# Patient Record
Sex: FEMALE | Race: WHITE | Employment: FULL TIME | URBAN - METROPOLITAN AREA
[De-identification: names, ages, dates, MRNs, and addresses within clinical notes are randomized per-mention and may not be internally consistent; named-entity substitution may affect disease eponyms.]

---

## 2018-02-22 ENCOUNTER — OFFICE VISIT (OUTPATIENT)
Dept: SURGERY | Facility: CLINIC | Age: 28
End: 2018-02-22
Payer: COMMERCIAL

## 2018-02-22 VITALS
DIASTOLIC BLOOD PRESSURE: 70 MMHG | HEIGHT: 63 IN | WEIGHT: 147.31 LBS | BODY MASS INDEX: 26.1 KG/M2 | HEART RATE: 89 BPM | SYSTOLIC BLOOD PRESSURE: 121 MMHG

## 2018-02-22 DIAGNOSIS — Z98.84 BARIATRIC SURGERY STATUS: Primary | ICD-10-CM

## 2018-02-22 PROCEDURE — 99244 OFF/OP CNSLTJ NEW/EST MOD 40: CPT | Performed by: SURGERY

## 2018-02-22 RX ORDER — LEVONORGESTREL AND ETHINYL ESTRADIOL 0.15-0.03
1 KIT ORAL DAILY
Status: ON HOLD | COMMUNITY
Start: 2017-10-26 | End: 2022-08-05

## 2018-02-22 NOTE — MR AVS SNAPSHOT
"              After Visit Summary   2018    Belkys Mosley    MRN: [id number]           Patient Information     Date Of Birth          1990        Visit Information        Provider Department      2018 2:00 PM Larry Shearer MD [facility]      Today's Diagnoses     Bariatric surgery status    -  1    BMI 26.0-26.9,adult           Follow-ups after your visit        Who to contact     If you have questions or need follow up information about today's clinic visit or your schedule please contact [facility] directly at [phone].  Normal or non-critical lab and imaging results will be communicated to you by MyChart, letter or phone within 4 business days after the clinic has received the results. If you do not hear from us within 7 days, please contact the clinic through U-NOTEhart or phone. If you have a critical or abnormal lab result, we will notify you by phone as soon as possible.  Submit refill requests through Mobvoi or call your pharmacy and they will forward the refill request to us. Please allow 3 business days for your refill to be completed.          Additional Information About Your Visit        MyChart Information     Mobvoi lets you send messages to your doctor, view your test results, renew your prescriptions, schedule appointments and more. To sign up, go to [email or web address] . Click on \"Log in\" on the left side of the screen, which will take you to the Welcome page. Then click on \"Sign up Now\" on the right side of the page.     You will be asked to enter the access code listed below, as well as some personal information. Please follow the directions to create your username and password.     Your access code is: [email or web address]  Expires: 2018  7:29 AM     Your access code will  in 90 days. If you need help or a new code, please call your [facility] clinic or " "[phone].        Care EveryWhere ID     This is your Care EveryWhere ID. This could be used by other organizations to access your [place] medical records  [id number]        Your Vitals Were     Pulse Height BMI (Body Mass Index)             89 5' 3\" (1.6 m) 26.1 kg/m2          Blood Pressure from Last 3 Encounters:   02/22/18 121/70   01/11/13 111/65    Weight from Last 3 Encounters:   02/22/18 147 lb 5 oz (66.8 kg)   01/11/13 152 lb (68.9 kg)              We Performed the Following     OP ROOMING NOTE TO LUIS ARMANDO        Primary Care Provider Office Phone # Fax #    Renay Mares [phone]       [facility] [address] [address] [facility] [address]        Equal Access to Services     [facility] DUNCAN : Hadii aad ku hadasho Soomaali, waaxda luqadaha, qaybta kaalmada adeegyada, nelly lee . So [facility] [phone].    ATENCIÓN: Si habla español, tiene a stewart disposición servicios gratuitos de asistencia lingüística. Daniel al [phone].    We comply with applicable federal civil rights laws and [place] laws. We do not discriminate on the basis of race, color, national origin, age, disability, sex, sexual orientation, or gender identity.            Thank you!     Thank you for choosing [place] [facility]  for your care. Our goal is always to provide you with excellent care. Hearing back from our patients is one way we can continue to improve our services. Please take a few minutes to complete the written survey that you may receive in the mail after your visit with us. Thank you!             Your Updated Medication List - Protect others around you: Learn how to safely use, store and throw away your medicines at www.disposemymeds.org.          This list is accurate as of 2/22/18 11:59 PM.  Always use your most recent med list.                   Brand Name Dispense Instructions for use Diagnosis    levonorgestrel-ethinyl estradiol " 0.15-30 MG-MCG per tablet    ELIZABETH     Take 1 tablet by mouth daily        ranitidine 150 MG tablet    ZANTAC     Take 150 mg by mouth as needed

## 2018-02-26 NOTE — PROGRESS NOTES
"[facility]  Surgery Consultation    CONSULTATION REQUESTED BY:  Renay Mares [phone]    HPI: This patient is a 27-year-old female who presents to discuss the possibility of laparoscopic gastric band removal and replacement.  She had a lap band placed approximately nine years ago.  She has been very successful with weight loss.  She has lost over 100 pounds since its initial placement.  Approximately two years ago she underwent replacement of her port.  This was performed due to dysfunction of the port and leaking.  In the recent past she has been experiencing lower abdominal pain.  Through the course of evaluation for this she has had imaging that shows the port has become disconnected from the band itself.  Additional imaging has also shown a slip of the band.  She has had episodes of epigastric abdominal pain as well as lower abdominal and pelvic pain.  She persists in noticing restriction associated with her band.  She was evaluated at an outside clinic but then decided to transfer her care to ours.    PMH:   has no past medical history on file.  PSH:    has a past surgical history that includes Bariatric Surgery and Ankle surgery (4502-0937).  Social History:   reports that she has never smoked. She has never used smokeless tobacco. She reports that she drinks alcohol. She reports that she does not use illicit drugs.  Family History:   family history includes Breast Cancer in her paternal grandfather.  Medications/Allergies: Home medications and allergies reviewed.    ROS:  The 10 point Review of Systems is negative other than noted in the HPI.    Physical Exam:  /70  Pulse 89  Ht 5' 3\" (1.6 m)  Wt 147 lb 5 oz (66.8 kg)  BMI 26.1 kg/m2  GENERAL: Generally appears well.  Psych: Alert and Oriented.  Normal affect  Eyes: Sclera clear  Respiratory:  Lungs clear to ausculation bilaterally with good air excursion  Cardiovascular:  Regular Rate and Rhythm with no murmurs gallops or " rubs, normal peripheral pulses  GI: Abdomen Non Distended Non-Tender  No hernias palpated..  Lymphatic/Hematologic/Immune:  No femoral or cervical lymphadenopathy.  Integumentary:  No rashes  Neurological: grossly intact       All new lab and imaging data was reviewed.     Impression and Plan:  Patient is a 27 year old female with band slip and port malfunction    PLAN: We discussed in detail her options.  The options that were discussed were the removal and replacement of her band.  We also discussed the possibility of simple removal of the band which she is not interested in due to the success that she has had with weight loss.  She has also not interested in other revisional surgery.  That said I believe should be of an appropriate candidate for band replacement.  This procedure was described in great detail.  The potential risks were also reviewed.  We will submit for approval to do her surgery and once obtained we'll proceed as such.    Larry Shearer M.D.    Please route or send letter to:  Primary Care Provider (PCP) and Referring Provider

## 2018-03-15 ENCOUNTER — ALLIED HEALTH/NURSE VISIT (OUTPATIENT)
Dept: SURGERY | Facility: CLINIC | Age: 28
End: 2018-03-15
Payer: COMMERCIAL

## 2018-03-15 ENCOUNTER — OFFICE VISIT (OUTPATIENT)
Dept: SURGERY | Facility: CLINIC | Age: 28
End: 2018-03-15
Payer: COMMERCIAL

## 2018-03-15 VITALS — HEIGHT: 63 IN | BODY MASS INDEX: 26.38 KG/M2 | WEIGHT: 148.9 LBS

## 2018-03-15 PROCEDURE — 97803 MED NUTRITION INDIV SUBSEQ: CPT | Performed by: DIETITIAN, REGISTERED

## 2018-03-15 PROCEDURE — 99207 ZZC NO CHARGE NURSE ONLY: CPT

## 2018-03-15 NOTE — MR AVS SNAPSHOT
MRN:[id number]                      After Visit Summary   3/15/2018    Belkys Mosley    MRN: [id number]           Visit Information        Provider Department      3/15/2018 10:30 AM 3, Sh Wl Diet, RD [facility] [place] [facility]      Your next 10 appointments already scheduled     Apr 13, 2018  7:30 AM CDT   [facility] OR with Larry Shearer MD   [facility] ([facility])    [facility] ([facility])   [phone]            Apr 13, 2018   Procedure with Larry Shearer MD   [facility] PeriOP Services (--)    [address] [place] [address] [address]  [place] [address] [phone]            Apr 17, 2018  1:30 PM CDT   Post Op with Sh Terrell Rn, RN   [facility] [place] ([facility])    [address]  [address]  [place] [address] [phone]            Apr 17, 2018  1:50 PM CDT   Post Op with Krystal Zimmer PA-C   [facility] [place] ([facility])    [address]  [address]  [place] [address] [phone]            Apr 26, 2018 11:00 AM CDT   Post Op with Sh Terrell Rn, RN   [facility] [place] ([facility])    [address]  [address]  [place] [address] [phone]            Apr 26, 2018 11:30 AM CDT   Post Op with Sh Wl Diet 3, RD   [facility] [place] ([facility])    [address]  [address]  [place] [address] [phone]            May 14, 2018  9:00 AM CDT   Post Op with Sh Wl Diet 1, RD   [facility] [place] ([facility])    [address]  [address]  [place] [address] [phone]            May  "2018  9:00 AM CDT   PROCEDURE with Dasha Peñaloza PA-C   [facility] ([facility])    [address] [place] [id number] [phone]              MyChart Information     Networked Insightshart lets you send messages to your doctor, view your test results, renew your prescriptions, schedule appointments and more. To sign up, go to [email or web address] . Click on \"Log in\" on the left side of the screen, which will take you to the Welcome page. Then click on \"Sign up Now\" on the right side of the page.     You will be asked to enter the access code listed below, as well as some personal information. Please follow the directions to create your username and password.     Your access code is: [email or web address]  Expires: 2018  8:29 AM     Your access code will  in 90 days. If you need help or a new code, please call your [facility] clinic or [phone].        Care EveryWhere ID     This is your Care EveryWhere ID. This could be used by other organizations to access your [place] medical records  [id number]        Equal Access to Services     ANTIONETTE SONG AH: Cornelia Marrero, warainda bettina, qaybta kaalmada aderosa, nelly gates. So [facility] [phone].    ATENCIÓN: Si habla español, tiene a stewart disposición servicios gratuitos de asistencia lingüística. Llame al [phone].    We comply with applicable federal civil rights laws and [place] laws. We do not discriminate on the basis of race, color, national origin, age, disability, sex, sexual orientation, or gender identity.            "

## 2018-03-15 NOTE — PROGRESS NOTES
Bariatric pre op class completed.  Class power point and patient checklist information gone over with patient.  Patient verbalized understanding of tasks needed to complete before surgery.  Patient also verbalized understanding of the power point and patient checklist information.  All questions were answered.  Quiz was completed.  Patient was advised to call if further questions.  Anyi Santiago, MS, RD, RN

## 2018-03-15 NOTE — MR AVS SNAPSHOT
After Visit Summary   3/15/2018    Belkys Mosley    MRN: [id number]           Patient Information     Date Of Birth          1990        Visit Information        Provider Department      3/15/2018 9:00 AM Rn, Beronica Tejada RN [facility]      Today's Diagnoses     BMI 26.0-26.9,adult    -  1       Follow-ups after your visit        Your next 10 appointments already scheduled     Apr 13, 2018  7:30 AM CDT   [facility] OR with Larry Shearer MD   [facility] ([facility])    [facility] ([facility])   [phone]            Apr 13, 2018   Procedure with Larry Shearer MD   [facility] PeriOP Services (--)    [address] [place], [place] [address] [address]   [phone]            Apr 17, 2018  1:30 PM CDT   Post Op with Beronica Tejada Rn, RN   [facility])    [address] [place] [address]   [phone]            Apr 17, 2018  1:50 PM CDT   Post Op with Krystal Zimmer PA-C   [facility] ([facility])    [address] [place], [place] [id number]  [facility] [address]   [phone]            Apr 26, 2018 11:00 AM CDT   Post Op with Beronica Tejada Rn, RN   [facility] ([facility])    [address] [place], [place] [address]  [facility] [address]   [phone]            Apr 26, 2018 11:30 AM CDT   Post Op with Sh Wl Diet 3, RD   [facility] ([facility])    [address] [place] [address]   [phone]            May 14, 2018  9:00 AM CDT   Post Op with Sh Wl Diet 1, RD   [facility] ([facility])    [address] [place], [place] [address]  [facility] [address] [phone]            May 21, 2018  9:00 AM CDT   PROCEDURE with Dasha Peñaloza PA-C   [facility] ([facility])    [address] [place], [place] [address]  [facility] [address] [phone]              Who to contact     If you have questions or need follow up information about today's clinic visit or your schedule please contact [facility] directly at [phone].  Normal or non-critical lab and imaging results will be communicated to you by MyChart, letter or phone within 4 business days after the clinic has received the results. If you do not hear from us within 7 days, please contact the clinic through Talkwheelhart or phone. If you have a critical or abnormal lab result, we will notify you by phone as soon as possible.  Submit refill requests through FanFueled or call your pharmacy and they will forward the refill request to us. Please allow 3 business days for your refill to be completed.          Additional Information About Your Visit        TalkwheelharWhite Source Information     FanFueled lets you send messages to your doctor, view your test results, renew your prescriptions, schedule appointments and more. To sign up, go to [email or web address] . Click on \"Log in\" on the left side of the screen, which will take you to the Welcome page. Then click on \"Sign up Now\" on the right side of the page.     You will be asked to enter the access code listed below, as well as some personal information. Please follow the directions to create your username and password.     Your access code is: [email or web address]  Expires: 2018  8:29 AM     Your access code will  in 90 days. If you need help or a new code, please call your [place] clinic or [phone].        Care EveryWhere ID     This is your Care EveryWhere ID. This could be used by other " organizations to access your [place] medical records  [id number]         Blood Pressure from Last 3 Encounters:   02/22/18 121/70   01/11/13 111/65    Weight from Last 3 Encounters:   03/15/18 148 lb 14.4 oz (67.5 kg)   02/22/18 147 lb 5 oz (66.8 kg)   01/11/13 152 lb (68.9 kg)              Today, you had the following     No orders found for display       Primary Care Provider Office Phone # Fax #    Renay Mares [phone]       [facility] [address] [facility] [address]        Equal Access to Services     [facility] : Hadii gabriela feltono Sotony, waaxda luqadaha, qaybta kaalmada adedonaldoyada, nelly gates. So [facility] [phone].    ATENCIÓN: Si habla español, tiene a stewart disposición servicios gratuitos de asistencia lingüística. [facility] [phone].    We comply with applicable federal civil rights laws and [place] laws. We do not discriminate on the basis of race, color, national origin, age, disability, sex, sexual orientation, or gender identity.            Thank you!     Thank you for choosing [facility]  for your care. Our goal is always to provide you with excellent care. Hearing back from our patients is one way we can continue to improve our services. Please take a few minutes to complete the written survey that you may receive in the mail after your visit with us. Thank you!             Your Updated Medication List - Protect others around you: Learn how to safely use, store and throw away your medicines at www.disposemymeds.org.          This list is accurate as of 3/15/18  4:22 PM.  Always use your most recent med list.                   Brand Name Dispense Instructions for use Diagnosis    levonorgestrel-ethinyl estradiol 0.15-30 MG-MCG per tablet    NORDETTE     Take 1 tablet by mouth daily        ranitidine 150 MG tablet    ZANTAC     Take 150 mg by mouth as needed

## 2018-03-15 NOTE — PROGRESS NOTES
NUTRITION POST OP APPOINTMENT  DATE OF VISIT: March 15, 2018    Belkys Mosley  1990  female  [id number]  27 year old     ASSESSMENT:    REASON FOR VISIT:  Belkys is a 27 year old year old female presents today for ~ 9 year PO nutrition follow-up appointment. Patient is accompanied by self.    DIAGNOSIS:  Status post laparoscopic band surgery.  Obesity Overweight BMI 25-29.9     ANTHROPOMETRICS:  Initial Weight:    Height:    Current Weight:     BMI:   kg/(m^2).    VITAMINS AND MINERALS:  Multivitamin daily     NUTRITION HISTORY:  Breakfast: protein shake, eggs rarely   Lunch: cauliflower rice or spaghetti squash with lean meat (chicken, turkey), rare sandwich   Supper: pizza made on tortilla, turkey burger with no bun   Snacks: generally avoids; rare dove dark chocolate (1 piece), rare baked chips or wheat thins or protein drink or string cheese   Fluids consumed: Water, protein drink, milk, enhanced water, extremely rare flat soda   Consuming liquid calories: Yes  Protein intake: 60-90 grams/day  Tolerate regular texture food: Yes  Any foods not tolerated details: Yes  If any food not tolerated: breads, raw vegetables, red meat   Portion size: 1/2-1 cup  Take 20-30 minutes to consume each meal: Yes   Eat protein foods first: Yes  Fluids and meals separate by at least 30 minutes: Yes  Chew foods thoroughly: Yes  Tolerating diet: Yes  Drinking high protein supplements: Yes  Consuming snacks per day: 0-2  Additional Information: Pt ~9 y s/p laparoscopic gastric band. Band slipped and being replaced; seeing patient to review post-op diet advancement. Reviewed Stage 1-5 of diet advancement and vitamins.       PHYSICAL ACTIVITY:  Type: walking, light jogging on treadmill, rollerblading, ice skating  Frequency (days per week): 5  Duration (min): 30-60    DIAGNOSIS:  Current Nutrition Diagnosis: Altered gastrointestinal function related to alteration in gastrointestinal structure as evidenced by history of  laparoscopic band surgery.    INTERVENTION:   Nutrition Prescription: Eat 3 meals a day at regular intervals. Consume 60-90 grams of protein daily. Follow post-surgical vitamin and mineral protocol.  Assessed learning needs and learning preferences.    GOALS:  Begin taking calcium, vitamin D and iron per guidelines  Follow post-op diet advancement after band replaced    Follow-Up:   Recommend standard post-op follow up visits to assist with lifestyle changes or per insurance.  Implementation: Discussed progress toward previous goals; reinforced importance of following bariatric lifestyle changes.    NUTRITION MONITORING AND EVALUATION:  Anticipated compliance: good  Verbalized good understanding.    Follow up: Patient to follow up per standard post-op protocol    TIME SPENT WITH PATIENT:  20 minutes    Roopa Ha

## 2018-03-26 RX ORDER — HEPARIN SODIUM 5000 [USP'U]/.5ML
5000 INJECTION, SOLUTION INTRAVENOUS; SUBCUTANEOUS
Status: CANCELLED | OUTPATIENT
Start: 2018-03-26

## 2018-04-13 ENCOUNTER — ANESTHESIA EVENT (OUTPATIENT)
Dept: SURGERY | Facility: CLINIC | Age: 28
End: 2018-04-13
Payer: COMMERCIAL

## 2018-04-13 ENCOUNTER — APPOINTMENT (OUTPATIENT)
Dept: SURGERY | Facility: PHYSICIAN GROUP | Age: 28
End: 2018-04-13
Payer: COMMERCIAL

## 2018-04-13 ENCOUNTER — ANESTHESIA (OUTPATIENT)
Dept: SURGERY | Facility: CLINIC | Age: 28
End: 2018-04-13
Payer: COMMERCIAL

## 2018-04-13 ENCOUNTER — HOSPITAL ENCOUNTER (INPATIENT)
Facility: CLINIC | Age: 28
LOS: 1 days | Discharge: HOME | End: 2018-04-14
Attending: SURGERY | Admitting: SURGERY
Payer: COMMERCIAL

## 2018-04-13 ENCOUNTER — SURGERY (OUTPATIENT)
Age: 28
End: 2018-04-13

## 2018-04-13 DIAGNOSIS — G89.18 ACUTE POST-OPERATIVE PAIN: Primary | ICD-10-CM

## 2018-04-13 DIAGNOSIS — K95.09 GASTRIC BAND SLIPPAGE: ICD-10-CM

## 2018-04-13 LAB
CREAT SERPL-MCNC: 0.83 MG/DL (ref 0.52–1.04)
GFR SERPL CREATININE-BSD FRML MDRD: 81 ML/MIN/1.7M2
HCG UR QL: NEGATIVE
PLATELET # BLD AUTO: 325 10E9/L (ref 150–450)

## 2018-04-13 PROCEDURE — 37000008 ZZH ANESTHESIA TECHNICAL FEE, 1ST 30 MIN: Performed by: SURGERY

## 2018-04-13 PROCEDURE — 25000125 ZZHC RX 250: Performed by: SURGERY

## 2018-04-13 PROCEDURE — 40000170 ZZH STATISTIC PRE-PROCEDURE ASSESSMENT II: Performed by: SURGERY

## 2018-04-13 PROCEDURE — 27810169 ZZH OR IMPLANT GENERAL: Performed by: SURGERY

## 2018-04-13 PROCEDURE — 81025 URINE PREGNANCY TEST: CPT | Performed by: ANESTHESIOLOGY

## 2018-04-13 PROCEDURE — 43774 LAP RMVL GASTR ADJ ALL PARTS: CPT | Mod: AS | Performed by: PHYSICIAN ASSISTANT

## 2018-04-13 PROCEDURE — 0DP64CZ REMOVAL OF EXTRALUMINAL DEVICE FROM STOMACH, PERCUTANEOUS ENDOSCOPIC APPROACH: ICD-10-PCS | Performed by: SURGERY

## 2018-04-13 PROCEDURE — 85049 AUTOMATED PLATELET COUNT: CPT | Performed by: PHYSICIAN ASSISTANT

## 2018-04-13 PROCEDURE — 43775 LAP SLEEVE GASTRECTOMY: CPT | Performed by: SURGERY

## 2018-04-13 PROCEDURE — 27210794 ZZH OR GENERAL SUPPLY STERILE: Performed by: SURGERY

## 2018-04-13 PROCEDURE — 27210995 ZZH RX 272: Performed by: SURGERY

## 2018-04-13 PROCEDURE — 25000128 H RX IP 250 OP 636: Performed by: ANESTHESIOLOGY

## 2018-04-13 PROCEDURE — 43775 LAP SLEEVE GASTRECTOMY: CPT | Mod: AS | Performed by: PHYSICIAN ASSISTANT

## 2018-04-13 PROCEDURE — 37000009 ZZH ANESTHESIA TECHNICAL FEE, EACH ADDTL 15 MIN: Performed by: SURGERY

## 2018-04-13 PROCEDURE — 36415 COLL VENOUS BLD VENIPUNCTURE: CPT | Performed by: PHYSICIAN ASSISTANT

## 2018-04-13 PROCEDURE — 43774 LAP RMVL GASTR ADJ ALL PARTS: CPT | Performed by: SURGERY

## 2018-04-13 PROCEDURE — 82565 ASSAY OF CREATININE: CPT | Performed by: PHYSICIAN ASSISTANT

## 2018-04-13 PROCEDURE — 36000067 ZZH SURGERY LEVEL 5 1ST 30 MIN: Performed by: SURGERY

## 2018-04-13 PROCEDURE — 71000013 ZZH RECOVERY PHASE 1 LEVEL 1 EA ADDTL HR: Performed by: SURGERY

## 2018-04-13 PROCEDURE — 25000128 H RX IP 250 OP 636: Performed by: NURSE ANESTHETIST, CERTIFIED REGISTERED

## 2018-04-13 PROCEDURE — 36000069 ZZH SURGERY LEVEL 5 EA 15 ADDTL MIN: Performed by: SURGERY

## 2018-04-13 PROCEDURE — 25000566 ZZH SEVOFLURANE, EA 15 MIN: Performed by: SURGERY

## 2018-04-13 PROCEDURE — 25000128 H RX IP 250 OP 636: Performed by: SURGERY

## 2018-04-13 PROCEDURE — 0DV64CZ RESTRICTION OF STOMACH WITH EXTRALUMINAL DEVICE, PERCUTANEOUS ENDOSCOPIC APPROACH: ICD-10-PCS | Performed by: SURGERY

## 2018-04-13 PROCEDURE — 27211024 ZZHC OR SUPPLY OTHER OPNP: Performed by: SURGERY

## 2018-04-13 PROCEDURE — 25800025 ZZH RX 258: Performed by: SURGERY

## 2018-04-13 PROCEDURE — 12000007 ZZH R&B INTERMEDIATE

## 2018-04-13 PROCEDURE — 25000125 ZZHC RX 250: Performed by: NURSE ANESTHETIST, CERTIFIED REGISTERED

## 2018-04-13 PROCEDURE — 25000128 H RX IP 250 OP 636: Performed by: PHYSICIAN ASSISTANT

## 2018-04-13 PROCEDURE — 71000012 ZZH RECOVERY PHASE 1 LEVEL 1 FIRST HR: Performed by: SURGERY

## 2018-04-13 DEVICE — IMPLANTABLE DEVICE: Type: IMPLANTABLE DEVICE | Site: STOMACH | Status: FUNCTIONAL

## 2018-04-13 RX ORDER — CEFAZOLIN SODIUM 2 G/100ML
2 INJECTION, SOLUTION INTRAVENOUS EVERY 8 HOURS
Status: DISCONTINUED | OUTPATIENT
Start: 2018-04-13 | End: 2018-04-13 | Stop reason: HOSPADM

## 2018-04-13 RX ORDER — NALOXONE HYDROCHLORIDE 0.4 MG/ML
.1-.4 INJECTION, SOLUTION INTRAMUSCULAR; INTRAVENOUS; SUBCUTANEOUS
Status: DISCONTINUED | OUTPATIENT
Start: 2018-04-13 | End: 2018-04-14 | Stop reason: HOSPADM

## 2018-04-13 RX ORDER — LIDOCAINE HYDROCHLORIDE 20 MG/ML
INJECTION, SOLUTION INFILTRATION; PERINEURAL PRN
Status: DISCONTINUED | OUTPATIENT
Start: 2018-04-13 | End: 2018-04-13

## 2018-04-13 RX ORDER — FENTANYL CITRATE 50 UG/ML
25-50 INJECTION, SOLUTION INTRAMUSCULAR; INTRAVENOUS
Status: DISCONTINUED | OUTPATIENT
Start: 2018-04-13 | End: 2018-04-13 | Stop reason: HOSPADM

## 2018-04-13 RX ORDER — MULTIPLE VITAMINS W/ MINERALS TAB 9MG-400MCG
1 TAB ORAL DAILY
COMMUNITY
End: 2018-04-23

## 2018-04-13 RX ORDER — ACETAMINOPHEN 325 MG/1
975 TABLET ORAL EVERY 8 HOURS
Status: DISCONTINUED | OUTPATIENT
Start: 2018-04-13 | End: 2018-04-14 | Stop reason: HOSPADM

## 2018-04-13 RX ORDER — CEFAZOLIN SODIUM 1 G/3ML
INJECTION, POWDER, FOR SOLUTION INTRAMUSCULAR; INTRAVENOUS PRN
Status: DISCONTINUED | OUTPATIENT
Start: 2018-04-13 | End: 2018-04-13

## 2018-04-13 RX ORDER — ACETAMINOPHEN 325 MG/1
650 TABLET ORAL EVERY 4 HOURS PRN
Status: DISCONTINUED | OUTPATIENT
Start: 2018-04-16 | End: 2018-04-14 | Stop reason: HOSPADM

## 2018-04-13 RX ORDER — PROCHLORPERAZINE MALEATE 5 MG
10 TABLET ORAL EVERY 6 HOURS PRN
Status: DISCONTINUED | OUTPATIENT
Start: 2018-04-13 | End: 2018-04-14 | Stop reason: HOSPADM

## 2018-04-13 RX ORDER — AMOXICILLIN 250 MG
1 CAPSULE ORAL 2 TIMES DAILY
Status: DISCONTINUED | OUTPATIENT
Start: 2018-04-13 | End: 2018-04-14 | Stop reason: HOSPADM

## 2018-04-13 RX ORDER — LIDOCAINE 40 MG/G
CREAM TOPICAL
Status: DISCONTINUED | OUTPATIENT
Start: 2018-04-13 | End: 2018-04-14 | Stop reason: HOSPADM

## 2018-04-13 RX ORDER — KETOROLAC TROMETHAMINE 30 MG/ML
30 INJECTION, SOLUTION INTRAMUSCULAR; INTRAVENOUS EVERY 6 HOURS
Status: DISCONTINUED | OUTPATIENT
Start: 2018-04-13 | End: 2018-04-14 | Stop reason: HOSPADM

## 2018-04-13 RX ORDER — FENTANYL CITRATE 50 UG/ML
INJECTION, SOLUTION INTRAMUSCULAR; INTRAVENOUS PRN
Status: DISCONTINUED | OUTPATIENT
Start: 2018-04-13 | End: 2018-04-13

## 2018-04-13 RX ORDER — ONDANSETRON 4 MG/1
4 TABLET, ORALLY DISINTEGRATING ORAL EVERY 6 HOURS PRN
Status: DISCONTINUED | OUTPATIENT
Start: 2018-04-13 | End: 2018-04-14 | Stop reason: HOSPADM

## 2018-04-13 RX ORDER — ONDANSETRON 4 MG/1
4 TABLET, ORALLY DISINTEGRATING ORAL EVERY 30 MIN PRN
Status: DISCONTINUED | OUTPATIENT
Start: 2018-04-13 | End: 2018-04-13 | Stop reason: HOSPADM

## 2018-04-13 RX ORDER — SODIUM CHLORIDE, SODIUM LACTATE, POTASSIUM CHLORIDE, CALCIUM CHLORIDE 600; 310; 30; 20 MG/100ML; MG/100ML; MG/100ML; MG/100ML
INJECTION, SOLUTION INTRAVENOUS CONTINUOUS
Status: DISCONTINUED | OUTPATIENT
Start: 2018-04-13 | End: 2018-04-13 | Stop reason: HOSPADM

## 2018-04-13 RX ORDER — CALCIUM CARBONATE 500 MG/1
2 TABLET, CHEWABLE ORAL EVERY 4 HOURS PRN
COMMUNITY
End: 2018-04-23

## 2018-04-13 RX ORDER — DIPHENHYDRAMINE HYDROCHLORIDE 50 MG/ML
25 INJECTION INTRAMUSCULAR; INTRAVENOUS EVERY 6 HOURS PRN
Status: DISCONTINUED | OUTPATIENT
Start: 2018-04-13 | End: 2018-04-14 | Stop reason: HOSPADM

## 2018-04-13 RX ORDER — PROPOFOL 10 MG/ML
INJECTION, EMULSION INTRAVENOUS PRN
Status: DISCONTINUED | OUTPATIENT
Start: 2018-04-13 | End: 2018-04-13

## 2018-04-13 RX ORDER — HYDROMORPHONE HYDROCHLORIDE 1 MG/ML
.3-.5 INJECTION, SOLUTION INTRAMUSCULAR; INTRAVENOUS; SUBCUTANEOUS EVERY 5 MIN PRN
Status: DISCONTINUED | OUTPATIENT
Start: 2018-04-13 | End: 2018-04-13 | Stop reason: HOSPADM

## 2018-04-13 RX ORDER — VECURONIUM BROMIDE 1 MG/ML
INJECTION, POWDER, LYOPHILIZED, FOR SOLUTION INTRAVENOUS PRN
Status: DISCONTINUED | OUTPATIENT
Start: 2018-04-13 | End: 2018-04-13

## 2018-04-13 RX ORDER — MAGNESIUM HYDROXIDE 1200 MG/15ML
LIQUID ORAL PRN
Status: DISCONTINUED | OUTPATIENT
Start: 2018-04-13 | End: 2018-04-13 | Stop reason: HOSPADM

## 2018-04-13 RX ORDER — NALOXONE HYDROCHLORIDE 0.4 MG/ML
.1-.4 INJECTION, SOLUTION INTRAMUSCULAR; INTRAVENOUS; SUBCUTANEOUS
Status: DISCONTINUED | OUTPATIENT
Start: 2018-04-13 | End: 2018-04-13

## 2018-04-13 RX ORDER — CEFAZOLIN SODIUM 2 G/100ML
2 INJECTION, SOLUTION INTRAVENOUS EVERY 8 HOURS
Status: COMPLETED | OUTPATIENT
Start: 2018-04-13 | End: 2018-04-14

## 2018-04-13 RX ORDER — ONDANSETRON 2 MG/ML
INJECTION INTRAMUSCULAR; INTRAVENOUS PRN
Status: DISCONTINUED | OUTPATIENT
Start: 2018-04-13 | End: 2018-04-13

## 2018-04-13 RX ORDER — SODIUM CHLORIDE, SODIUM LACTATE, POTASSIUM CHLORIDE, CALCIUM CHLORIDE 600; 310; 30; 20 MG/100ML; MG/100ML; MG/100ML; MG/100ML
INJECTION, SOLUTION INTRAVENOUS CONTINUOUS
Status: DISCONTINUED | OUTPATIENT
Start: 2018-04-13 | End: 2018-04-14 | Stop reason: HOSPADM

## 2018-04-13 RX ORDER — GLYCOPYRROLATE 0.2 MG/ML
INJECTION, SOLUTION INTRAMUSCULAR; INTRAVENOUS PRN
Status: DISCONTINUED | OUTPATIENT
Start: 2018-04-13 | End: 2018-04-13

## 2018-04-13 RX ORDER — CEFAZOLIN SODIUM 1 G/3ML
1 INJECTION, POWDER, FOR SOLUTION INTRAMUSCULAR; INTRAVENOUS EVERY 8 HOURS
Status: DISCONTINUED | OUTPATIENT
Start: 2018-04-13 | End: 2018-04-13

## 2018-04-13 RX ORDER — BUPIVACAINE HYDROCHLORIDE AND EPINEPHRINE 2.5; 5 MG/ML; UG/ML
INJECTION, SOLUTION INFILTRATION; PERINEURAL PRN
Status: DISCONTINUED | OUTPATIENT
Start: 2018-04-13 | End: 2018-04-13 | Stop reason: HOSPADM

## 2018-04-13 RX ORDER — DIPHENHYDRAMINE HCL 25 MG
25 CAPSULE ORAL EVERY 6 HOURS PRN
Status: DISCONTINUED | OUTPATIENT
Start: 2018-04-13 | End: 2018-04-14 | Stop reason: HOSPADM

## 2018-04-13 RX ORDER — NEOSTIGMINE METHYLSULFATE 1 MG/ML
VIAL (ML) INJECTION PRN
Status: DISCONTINUED | OUTPATIENT
Start: 2018-04-13 | End: 2018-04-13

## 2018-04-13 RX ORDER — DEXAMETHASONE SODIUM PHOSPHATE 4 MG/ML
INJECTION, SOLUTION INTRA-ARTICULAR; INTRALESIONAL; INTRAMUSCULAR; INTRAVENOUS; SOFT TISSUE PRN
Status: DISCONTINUED | OUTPATIENT
Start: 2018-04-13 | End: 2018-04-13

## 2018-04-13 RX ORDER — ONDANSETRON 2 MG/ML
4 INJECTION INTRAMUSCULAR; INTRAVENOUS EVERY 6 HOURS PRN
Status: DISCONTINUED | OUTPATIENT
Start: 2018-04-13 | End: 2018-04-14 | Stop reason: HOSPADM

## 2018-04-13 RX ORDER — HYDROCORTISONE 2.5 %
CREAM (GRAM) TOPICAL DAILY PRN
Status: ON HOLD | COMMUNITY
End: 2024-06-28

## 2018-04-13 RX ORDER — OXYCODONE HYDROCHLORIDE 5 MG/1
5-10 TABLET ORAL
Status: DISCONTINUED | OUTPATIENT
Start: 2018-04-13 | End: 2018-04-14

## 2018-04-13 RX ORDER — ONDANSETRON 2 MG/ML
4 INJECTION INTRAMUSCULAR; INTRAVENOUS EVERY 30 MIN PRN
Status: DISCONTINUED | OUTPATIENT
Start: 2018-04-13 | End: 2018-04-13 | Stop reason: HOSPADM

## 2018-04-13 RX ORDER — AMOXICILLIN 250 MG
2 CAPSULE ORAL 2 TIMES DAILY
Status: DISCONTINUED | OUTPATIENT
Start: 2018-04-13 | End: 2018-04-14 | Stop reason: HOSPADM

## 2018-04-13 RX ADMIN — MIDAZOLAM 2 MG: 1 INJECTION INTRAMUSCULAR; INTRAVENOUS at 07:30

## 2018-04-13 RX ADMIN — HYDROMORPHONE HYDROCHLORIDE: 10 INJECTION, SOLUTION INTRAMUSCULAR; INTRAVENOUS; SUBCUTANEOUS at 21:38

## 2018-04-13 RX ADMIN — HYDROMORPHONE HYDROCHLORIDE 0.5 MG: 1 INJECTION, SOLUTION INTRAMUSCULAR; INTRAVENOUS; SUBCUTANEOUS at 12:01

## 2018-04-13 RX ADMIN — DEXMEDETOMIDINE HYDROCHLORIDE 12 MCG: 100 INJECTION, SOLUTION INTRAVENOUS at 08:01

## 2018-04-13 RX ADMIN — FENTANYL CITRATE 100 MCG: 50 INJECTION, SOLUTION INTRAMUSCULAR; INTRAVENOUS at 07:33

## 2018-04-13 RX ADMIN — SODIUM CHLORIDE 1000 ML: 900 IRRIGANT IRRIGATION at 07:20

## 2018-04-13 RX ADMIN — NEOSTIGMINE METHYLSULFATE 3 MG: 1 INJECTION, SOLUTION INTRAVENOUS at 09:59

## 2018-04-13 RX ADMIN — LIDOCAINE HYDROCHLORIDE 60 MG: 20 INJECTION, SOLUTION INFILTRATION; PERINEURAL at 07:33

## 2018-04-13 RX ADMIN — CEFAZOLIN 2 G: 1 INJECTION, POWDER, FOR SOLUTION INTRAMUSCULAR; INTRAVENOUS at 09:40

## 2018-04-13 RX ADMIN — KETOROLAC TROMETHAMINE 30 MG: 30 INJECTION, SOLUTION INTRAMUSCULAR at 23:44

## 2018-04-13 RX ADMIN — HYDROMORPHONE HYDROCHLORIDE 0.5 MG: 1 INJECTION, SOLUTION INTRAMUSCULAR; INTRAVENOUS; SUBCUTANEOUS at 11:00

## 2018-04-13 RX ADMIN — SODIUM CHLORIDE, POTASSIUM CHLORIDE, SODIUM LACTATE AND CALCIUM CHLORIDE: 600; 310; 30; 20 INJECTION, SOLUTION INTRAVENOUS at 19:34

## 2018-04-13 RX ADMIN — GLYCOPYRROLATE 0.6 MG: 0.2 INJECTION, SOLUTION INTRAMUSCULAR; INTRAVENOUS at 09:59

## 2018-04-13 RX ADMIN — SODIUM CHLORIDE, POTASSIUM CHLORIDE, SODIUM LACTATE AND CALCIUM CHLORIDE: 600; 310; 30; 20 INJECTION, SOLUTION INTRAVENOUS at 09:00

## 2018-04-13 RX ADMIN — SODIUM CHLORIDE, POTASSIUM CHLORIDE, SODIUM LACTATE AND CALCIUM CHLORIDE: 600; 310; 30; 20 INJECTION, SOLUTION INTRAVENOUS at 10:57

## 2018-04-13 RX ADMIN — KETOROLAC TROMETHAMINE 30 MG: 30 INJECTION, SOLUTION INTRAMUSCULAR at 14:05

## 2018-04-13 RX ADMIN — DEXAMETHASONE SODIUM PHOSPHATE 4 MG: 4 INJECTION, SOLUTION INTRA-ARTICULAR; INTRALESIONAL; INTRAMUSCULAR; INTRAVENOUS; SOFT TISSUE at 07:42

## 2018-04-13 RX ADMIN — CEFAZOLIN SODIUM 2 G: 2 INJECTION, SOLUTION INTRAVENOUS at 17:34

## 2018-04-13 RX ADMIN — CEFAZOLIN SODIUM 2 G: 2 INJECTION, SOLUTION INTRAVENOUS at 07:39

## 2018-04-13 RX ADMIN — HYDROMORPHONE HYDROCHLORIDE 0.5 MG: 1 INJECTION, SOLUTION INTRAMUSCULAR; INTRAVENOUS; SUBCUTANEOUS at 08:03

## 2018-04-13 RX ADMIN — DEXMEDETOMIDINE HYDROCHLORIDE 8 MCG: 100 INJECTION, SOLUTION INTRAVENOUS at 08:30

## 2018-04-13 RX ADMIN — ONDANSETRON 4 MG: 2 INJECTION INTRAMUSCULAR; INTRAVENOUS at 09:47

## 2018-04-13 RX ADMIN — ROCURONIUM BROMIDE 50 MG: 10 INJECTION INTRAVENOUS at 07:33

## 2018-04-13 RX ADMIN — SODIUM CHLORIDE, POTASSIUM CHLORIDE, SODIUM LACTATE AND CALCIUM CHLORIDE: 600; 310; 30; 20 INJECTION, SOLUTION INTRAVENOUS at 06:32

## 2018-04-13 RX ADMIN — ONDANSETRON 4 MG: 2 INJECTION INTRAMUSCULAR; INTRAVENOUS at 11:01

## 2018-04-13 RX ADMIN — BUPIVACAINE HYDROCHLORIDE AND EPINEPHRINE BITARTRATE 30 ML: 2.5; .005 INJECTION, SOLUTION EPIDURAL; INFILTRATION; INTRACAUDAL; PERINEURAL at 09:48

## 2018-04-13 RX ADMIN — HYDROMORPHONE HYDROCHLORIDE: 10 INJECTION, SOLUTION INTRAMUSCULAR; INTRAVENOUS; SUBCUTANEOUS at 10:55

## 2018-04-13 RX ADMIN — HYDROMORPHONE HYDROCHLORIDE 0.5 MG: 1 INJECTION, SOLUTION INTRAMUSCULAR; INTRAVENOUS; SUBCUTANEOUS at 09:23

## 2018-04-13 RX ADMIN — VECURONIUM BROMIDE 2 MG: 1 INJECTION, POWDER, LYOPHILIZED, FOR SOLUTION INTRAVENOUS at 08:45

## 2018-04-13 RX ADMIN — PROPOFOL 200 MG: 10 INJECTION, EMULSION INTRAVENOUS at 07:33

## 2018-04-13 RX ADMIN — VECURONIUM BROMIDE 1 MG: 1 INJECTION, POWDER, LYOPHILIZED, FOR SOLUTION INTRAVENOUS at 09:29

## 2018-04-13 ASSESSMENT — LIFESTYLE VARIABLES: TOBACCO_USE: 0

## 2018-04-13 ASSESSMENT — ENCOUNTER SYMPTOMS: SEIZURES: 0

## 2018-04-13 NOTE — PROGRESS NOTES
Dr. Shearer ok without Lovenox.  Pt should still have PCDs and ambulate 4-5x daily.  Likely discharge to home on 4/14 am.  UGI in the am.  Plan to start clears if looks ok.  Home if pain and diet well controlled and VS stable.

## 2018-04-13 NOTE — PHARMACY-CONSULT NOTE
Bariatric Consult    Medications evaluated as requested per Bariatric Consult.     No medication changes were needed based on the Bariatric Medication Management Policy.    The pharmacist will continue to follow as new medications are ordered.    Lorna Buckley, MadisonD, BCPS

## 2018-04-13 NOTE — IP AVS SNAPSHOT
MRN:[id number]                      After Visit Summary   4/13/2018    Belkys Mosley    MRN: [id number]           Thank you!     Thank you for choosing [facility] for your care. Our goal is always to provide you with excellent care. Hearing back from our patients is one way we can continue to improve our services. Please take a few minutes to complete the written survey that you may receive in the mail after you visit with us. Thank you!        Patient Information     Date Of Birth          1990        Designated Caregiver       Most Recent Value    Caregiver    Will someone help with your care after discharge? yes    Name of designated caregiver Melanie    Phone number of caregiver     Caregiver address [place]      About your hospital stay     You were admitted on:  April 13, 2018 You last received care in the:  [place] [facility] [address] [facility]    You were discharged on:  April 14, 2018       Who to Call     For medical emergencies, please call 911.  For non-urgent questions about your medical care, please call your primary care provider or clinic, [phone]  For questions related to your surgery, please call your surgery clinic        Attending Provider     Provider Specialty    Larry Shearer MD General Surgery       Primary Care Provider Office Phone # Fax #    Renay ARELY Vishnu [phone]      After Care Instructions     Activity       Avoid lifting over 20 lbs and strenuous physical activity for 3 weeks.  Ok to shower, but avoid submersing the incision for 2 weeks from surgery.  May apply ice to operative site as needed for comfort; alternating 10 minutes on/off.            Diet       Continue recommendations as directed by dietician            Discharge Instructions       [facility] next week as scheduled and dietician the following week.  Call [phone] with any questions.  Do not consume alcohol or drive  while you are on pain medications.  Avoid NSAIDs (such as ibuprofen, naproxen, or aspirin).  You may hold your vitamins/supplements if you are having nausea, try to resume your chewable vitamin if you are able to.  You may take a chewable calcium in place of the tablet for 1-2 months.  Continue to ambulate and use your incentive spirometer at home.                  Your next 10 appointments already scheduled     Apr 17, 2018  1:30 PM CDT   Post Op with Beronica Tejada Rn, RN   [facility] ([facility])    [address] [place] [address]   [phone]            Apr 17, 2018  1:50 PM CDT   Post Op with Krystal Zimmer PA-C   [facility] ([facility])    [address] [place] [address]   [phone]            Apr 26, 2018 11:00 AM CDT   Post Op with Beronica Tejada Rn, RN   [facility] ([facility])    [address] [place] [address]   [phone]            Apr 26, 2018 11:30 AM CDT   Post Op with Beronica Tejada Diet 1, RD   [facility] ([facility])    [address] [place] [address]   [phone]            May 14, 2018  9:00 AM CDT   Post Op with Beronica Tejada Diet 1, RD   [facility] ([facility])    [address] [place] [address]   [phone]            May 21, 2018  9:00 AM CDT   PROCEDURE with Dasha Peñaloza PA-C   [facility])    [address] [place] [address]   [phone]              Further instructions from your care team            For informational purposes only. Not to replace the advice of your health  care provider. Copyright   2006 [facility]. All rights reserved. [employer] [id number] - REV 09/14  After Bariatric Surgery  [facility]  Note: Before you go home, ask your nurse to order your pain medicine from the pharmacy. Be sure you have your medicine with you when you leave.  How much fluid should I drink?    Drink at least 1 ounce of fluid every 15 minutes (1/2 cup per hour) during the day.     Carry a water bottle with you. Drink from it often.    Keep track of how much fluid you drink in a day.    Adjustable stomach band: Do not overeat or drink too much. This can cause vomiting (throwing up), stretch your stomach, or make your stomach slip up over your band.    Remember:  - Do not use straws, chew gum or suck on hard candies. They may cause painful gas.   - No cold drinks.  - No coffee, soda pop or drinks with caffeine. These may cause stomach pain.  - No alcohol. It is bad for your liver and will cause stomach pain. It also adds a lot of calories.  What can I do for pain control?      You had major abdominal surgery that involves all layers of your abdominal muscles.   Pain is expected, even as far out as 6-8 weeks postop.  Moving, sneezing, coughing, and  breathing will cause pain because these activities use your abdominal muscles.      You can take the liquid pain medicine as prescribed. You can also try to wean off from it  as soon as you feel comfortable.  Do not drive while you are taking pain medicine.   This is dangerous.     You may take liquid Tylenol (acetaminophen) for pain in place of the prescribed pain  medicine. Do not take more than 3000 mg in a 24 hour period.     You may also apply ice or heat to the affected area(s).  Just remember to wrap the ice  in something and limit icing sessions to 20 minutes. Excessive icing can irritate the skin  or cause tissue damage.   You can apply heat with a hot, wet towel or heating pad. Just like cold therapy, limit  heat  application to 20 minutes. Never sleep with a heating pad on. It could cause severe  burns to your skin.    Do not take NSAID s (ibuprofen, Motrin, Advil, Aleve, Naproxen). They will increase you risk for bleeding or getting an ulcer.    If you have any of the following pain issues, we would like to talk to you:  - pain that does not improve with rest  - pain that gets worse and worse  - pain that is not controlled by your pain medicine  - a sudden severe increase in pain  -   If your doctor prescribes Zantac, take it as ordered. Take it for 3 months to prevent       Ulcers.  -   If you took an antacid before you had surgery, keep taking it for 3 months after           surgery. This will help prevent ulcers.   - Take any other medicines that your doctor tells you to take.   - Wear your binder to support your belly muscles.  Please call the clinic at [phone] for any concerns      How much rest do I need?  Get plenty of rest the first few days after surgery. Balance rest and activity.  Do not nap more than one hour during the day. Set a timer to wake yourself up, if needed. Too much sleep will keep you from drinking enough fluid during the day.  What should I know about my incisions (cuts)?  - Change your bandages as needed or if they get wet.   -Call your doctor if you have any of these signs of infection:   - Redness around the site.   - Drainage that smells bad.   - Fever of 101  F (38.3  C) or higher when taken under the tongue.- Chills.  If you     have a drain:  - Do not pull on the drain. Do not pull the drain out. We will take out your drain at your first clinic visit.  - The color and amount of fluid varies. Right after surgery the fluid is bright red. Over time, it changes to light pink and may become clear or the color of straw.   Will my urine or bowel movements change?   You might not have a bowel movement for several days after surgery. Your first bowel movements will likely be liquid.   Gastric  bypass or sleeve gastrectomy: You may also notice old blood or a darker color in your stools (bowel movements).   Your urine should be clear to light yellow. This shows that you are drinking enough fluid. You should urinate (pass water) at least 2 to 3 times during the day. If not, call us.  What kind of activity is safe?  For 4 weeks after surgery:     Walk for a short time every day.    Do not jog or run.    No weight lifting or belly exercises.  No swimming, baths or hot tubs until your cuts are healed (scabs are gone). You may shower.  No outside activity in hot, humid weather until you can drink 48 to 64 ounces of fluid in 24 hours. If you sweat a lot, your body may lose too much water.   The first month after surgery, do not take any trips where you must sit for a long time. You could get a blood clot in your legs.  Call the clinic at [phone] if:    Your pain medicine is not working.    You do not urinate (pass water) 2 to 3 times per day.    You have any signs of infection.    You have belly pain that gets worse and worse.    You have swollen legs with pain behind the knee or calf.    You have chest pain or feel very short of breath.    You have any questions or concerns.    You have a sudden severe increase in heart rate.    You have vomiting that gets worse and worse.  When should I go back to the clinic?  Time Gastric bypass or sleeve gastrectomy Adjustable gastric band   Week 1 See the physician or physician assistant (PA). See the nurse and physical therapist.   Week 2 See the nurse and dietitian. See the nurse and dietitian.   Week 4 Have a nutrition consult with the dietitian. See the PA and dietitian.   Week 6  Go for the first adjustment (fill) of your stomach band.   For the first year (or until your BMI is less than 30) Go to the clinic each month to see if you need a band adjustment (fill).         Pending Results     No orders found for last 3 day(s).            Statement of Approval      "Ordered          18 1308  I have reviewed and agree with all the recommendations and orders detailed in this document.  EFFECTIVE NOW     Comments:  Ok for discharge if tolerating bariatric diet and pain medication   Approved and electronically signed by:  Kevin Collado PA-C             Admission Information     Date & Time Provider Department Dept. Phone    2018 Larry Shearer MD [place] [address] [facility] [phone]      Your Vitals Were     Blood Pressure Pulse Temperature Respirations Pulse Oximetry       126/57 (BP Location: Right arm) 66 98.7  F (37.1  C) (Oral) 16 96%       MyChart Information     [employer] lets you send messages to your doctor, view your test results, renew your prescriptions, schedule appointments and more. To sign up, go to [email or web address] . Click on \"Log in\" on the left side of the screen, which will take you to the Welcome page. Then click on \"Sign up Now\" on the right side of the page.     You will be asked to enter the access code listed below, as well as some personal information. Please follow the directions to create your username and password.     Your access code is: [email or web address]  Expires: 2018  8:29 AM     Your access code will  in 90 days. If you need help or a new code, please call your [facility] clinic or [phone].        Care EveryWhere ID     This is your Care EveryWhere ID. This could be used by other organizations to access your [facility] medical records  [id number]        Equal Access to Services     ANTIONETTE SONG : Hadii aad ku hadasho Soomaali, waaxda luqadaha, qaybta kaalmada adeegyada, nelly lee . So [facility] [phone].    ATENCIÓN: Si habla español, tiene a stewart disposición servicios gratuitos de asistencia lingüística. Llame al [phone].    We comply with applicable federal civil rights laws and [place] laws. We do not discriminate on the basis of race, color, national " origin, age, disability, sex, sexual orientation, or gender identity.               Review of your medicines      START taking        Dose / Directions    oxyCODONE 5 MG/5ML solution   Commonly known as:  ROXICODONE   Used for:  Acute post-operative pain        Dose:  5 mg   Take 5 mLs (5 mg) by mouth every 4 hours as needed for moderate to severe pain   Quantity:  150 mL   Refills:  0         CONTINUE these medicines which may have CHANGED, or have new prescriptions. If we are uncertain of the size of tablets/capsules you have at home, strength may be listed as something that might have changed.        Dose / Directions    ranitidine 150 MG tablet   Commonly known as:  ZANTAC   This may have changed:    - when to take this  - reasons to take this        Dose:  150 mg   Take 1 tablet (150 mg) by mouth 2 times daily   Quantity:  60 tablet   Refills:  2         CONTINUE these medicines which have NOT CHANGED        Dose / Directions    calcium carbonate 500 MG chewable tablet   Commonly known as:  TUMS   Notes to Patient:  Not given here, resume as normal        Dose:  2 chew tab   Take 2 chew tab by mouth every 4 hours as needed for heartburn   Refills:  0       hydrocortisone 2.5 % cream   Notes to Patient:  Not given here, resume as normal        Apply topically daily as needed for other (Rosecea to ears)   Refills:  0       levonorgestrel-ethinyl estradiol 0.15-30 MG-MCG per tablet   Commonly known as:  ELIZABETH   Notes to Patient:  Not given here, resume as normal        Dose:  1 tablet   Take 1 tablet by mouth daily   Refills:  0       Multi-vitamin Tabs tablet   Notes to Patient:  Not taken here, resume as normal        Dose:  1 tablet   Take 1 tablet by mouth daily   Refills:  0            Where to get your medicines      These medications were sent to [facility] [place], [place] - [address]  [address], [place] [address]     Phone:  [phone]     ranitidine 150 MG tablet          Some of these will need a paper prescription and others can be bought over the counter. Ask your nurse if you have questions.     Bring a paper prescription for each of these medications     oxyCODONE 5 MG/5ML solution                Protect others around you: Learn how to safely use, store and throw away your medicines at www.disposemymeds.org.        Information about OPIOIDS     PRESCRIPTION OPIOIDS: WHAT YOU NEED TO KNOW    Prescription opioids can be used to help relieve moderate to severe pain and are often prescribed following a surgery or injury, or for certain health conditions. These medications can be an important part of treatment but also come with serious risks. It is important to work with your health care provider to make sure you are getting the safest, most effective care.    WHAT ARE THE RISKS AND SIDE EFFECTS OF OPIOID USE?  Prescription opioids carry serious risks of addiction and overdose, especially with prolonged use. An opioid overdose, often marked by slowed breathing can cause sudden death. The use of prescription opioids can have a number of side effects as well, even when taken as directed:      Tolerance - meaning you might need to take more of a medication for the same pain relief    Physical dependence - meaning you have symptoms of withdrawal when a medication is stopped    Increased sensitivity to pain    Constipation    Nausea, vomiting, and dry mouth    Sleepiness and dizziness    Confusion    Depression    Low levels of testosterone that can result in lower sex drive, energy, and strength    Itching and sweating    RISKS ARE GREATER WITH:    History of drug misuse, substance use disorder, or overdose    Mental health conditions (such as depression or anxiety)    Sleep apnea    Older age (65 years or older)    Pregnancy    Avoid alcohol while taking prescription opioids.   Also, unless specifically advised by your health care provider, medications to avoid  include:    Benzodiazepines (such as Xanax or Valium)    Muscle relaxants (such as Soma or Flexeril)    Hypnotics (such as Ambien or Lunesta)    Other prescription opioids    KNOW YOUR OPTIONS:  Talk to your health care provider about ways to manage your pain that do not involve prescription opioids. Some of these options may actually work better and have fewer risks and side effects:    Pain relievers such as acetaminophen, ibuprofen, and naproxen    Some medications that are also used for depression or seizures    Physical therapy and exercise    Cognitive behavioral therapy, a psychological, goal-directed approach, in which patients learn how to modify physical, behavioral, and emotional triggers of pain and stress    IF YOU ARE PRESCRIBED OPIOIDS FOR PAIN:    Never take opioids in greater amounts or more often than prescribed    Follow up with your primary health care provider and work together to create a plan on how to manage your pain.    Talk about ways to help manage your pain that do not involve prescription opioids    Talk about all concerns and side effects    Help prevent misuse and abuse    Never sell or share prescription opioids    Never use another person's prescription opioids    Store prescription opioids in a secure place and out of reach of others (this may include visitors, children, friends, and family)    Visit www.cdc.gov/drugoverdose to learn about risks of opioid abuse and overdose    If you believe you may be struggling with addiction, tell your health care provider and ask for guidance or call [facility]'s National Helpline at 4-327-868-HELP    LEARN MORE / www.cdc.gov/drugoverdose/prescribing/guideline.html    Safely dispose of unused prescription opioids: Find your local drug take-back programs and more information about the importance of safe disposal at www.doseofreality.[place].gov             Medication List: This is a list of all your medications and when to take them. Check marks below  indicate your daily home schedule. Keep this list as a reference.      Medications           Morning Afternoon Evening Bedtime As Needed    calcium carbonate 500 MG chewable tablet   Commonly known as:  TUMS   Take 2 chew tab by mouth every 4 hours as needed for heartburn   Notes to Patient:  Not given here, resume as normal                                   hydrocortisone 2.5 % cream   Apply topically daily as needed for other (Rosecea to ears)   Notes to Patient:  Not given here, resume as normal                                   levonorgestrel-ethinyl estradiol 0.15-30 MG-MCG per tablet   Commonly known as:  NORDETTE   Take 1 tablet by mouth daily   Notes to Patient:  Not given here, resume as normal                                   Multi-vitamin Tabs tablet   Take 1 tablet by mouth daily   Notes to Patient:  Not taken here, resume as normal                                   oxyCODONE 5 MG/5ML solution   Commonly known as:  ROXICODONE   Take 5 mLs (5 mg) by mouth every 4 hours as needed for moderate to severe pain   Last time this was given:  5 mg on 4/14/2018  2:10 PM   Next Dose Due:  You may take again at 6:10pm or later                                   ranitidine 150 MG tablet   Commonly known as:  ZANTAC   Take 1 tablet (150 mg) by mouth 2 times daily   Next Dose Due:  Tonight, 4/14/18.  Approximately 12 hours between doses.

## 2018-04-13 NOTE — PROGRESS NOTES
Admission medication history interview status for the 4/13/2018  admission is complete. See EPIC admission navigator for prior to admission medications     Medication history source reliability:Good    Medication history interview source(s):Patient    Medication history resources (including written lists, pill bottles, clinic record):None    Primary pharmacy.[facility]    Additional medication history information not noted on PTA med list :None    Time spent in this activity: 40 minutes    Prior to Admission medications    Medication Sig Last Dose Taking? Auth Provider   hydrocortisone 2.5 % cream Apply topically daily as needed for other (Rosecea to ears) More than a month at PRN Yes Reported, Patient   multivitamin, therapeutic with minerals (MULTI-VITAMIN) TABS tablet Take 1 tablet by mouth daily 4/12/2018 at AM Yes Reported, Patient   calcium carbonate (TUMS) 500 MG chewable tablet Take 2 chew tab by mouth every 4 hours as needed for heartburn 4/12/2018 at 1630 Yes Reported, Patient   levonorgestrel-ethinyl estradiol (NORDETTE) 0.15-30 MG-MCG per tablet Take 1 tablet by mouth daily 4/10/2018 at AM Yes Reported, Patient   ranitidine (ZANTAC) 150 MG tablet Take 150 mg by mouth 2 times daily as needed  4/11/2018 at PRN Yes Reported, Patient

## 2018-04-13 NOTE — ANESTHESIA PREPROCEDURE EVALUATION
Anesthesia Evaluation     . Pt has had prior anesthetic.     No history of anesthetic complications          ROS/MED HX    ENT/Pulmonary:      (-) tobacco use and sleep apnea   Neurologic:      (-) seizures   Cardiovascular:        (-) hypertension   METS/Exercise Tolerance:     Hematologic:     (+) Anemia, -      Musculoskeletal:         GI/Hepatic:     (+) GERD      (-) liver disease   Renal/Genitourinary:      (-) renal disease   Endo:      (-) Type II DM and thyroid disease   Psychiatric:         Infectious Disease:         Malignancy:         Other:                     Physical Exam  Normal systems: cardiovascular, pulmonary and dental    Airway   Mallampati: II  TM distance: >3 FB  Neck ROM: full    Dental     Cardiovascular       Pulmonary                     Anesthesia Plan      History & Physical Review  History and physical reviewed and following examination; no interval change.    ASA Status:  2 .    NPO Status:  > 8 hours    Plan for General and ETT with Intravenous induction. Maintenance will be Balanced.    PONV prophylaxis:  Ondansetron (or other 5HT-3) and Dexamethasone or Solumedrol       Postoperative Care  Postoperative pain management:  IV analgesics.      Consents  Anesthetic plan, risks, benefits and alternatives discussed with:  Patient..        Procedure: Procedure(s):  LAPAROSCOPIC REMOVAL GASTRIC ADJUSTABLE BAND  Preop diagnosis: other  complications of other bariatric procedure     No Known Allergies  Past Medical History:   Diagnosis Date     Gastroesophageal reflux disease      Past Surgical History:   Procedure Laterality Date     ANKLE SURGERY  0056-4903    Right     BARIATRIC SURGERY       GI SURGERY      Laparoscopic adjustable gastric banding 2015     ORTHOPEDIC SURGERY      Right ankle surgery 2005     Prior to Admission medications    Medication Sig Start Date End Date Taking? Authorizing Provider   hydrocortisone 2.5 % cream Apply topically daily as needed for other (Genny haro  ears)   Yes Reported, Patient   multivitamin, therapeutic with minerals (MULTI-VITAMIN) TABS tablet Take 1 tablet by mouth daily   Yes Reported, Patient   calcium carbonate (TUMS) 500 MG chewable tablet Take 2 chew tab by mouth every 4 hours as needed for heartburn   Yes Reported, Patient   levonorgestrel-ethinyl estradiol (NORDETTE) 0.15-30 MG-MCG per tablet Take 1 tablet by mouth daily 10/26/17  Yes Reported, Patient   ranitidine (ZANTAC) 150 MG tablet Take 150 mg by mouth 2 times daily as needed    Yes Reported, Patient     Current Facility-Administered Medications Ordered in Epic   Medication Dose Route Frequency Last Rate Last Dose     Provider ordered ALTERNATE pre op antibiotic.  1 each As instructed Continuous         lidocaine 1 % 1 mL  1 mL Other Q1H PRN         lactated ringers infusion   Intravenous Continuous 25 mL/hr at 04/13/18 0632       ceFAZolin (ANCEF) intermittent infusion 2 g in 100 mL dextrose PRE-MIX  2 g Intravenous Q8H         No current [facility]-ordered outpatient prescriptions on file.     Wt Readings from Last 1 Encounters:   03/15/18 67.5 kg (148 lb 14.4 oz)     Temp Readings from Last 1 Encounters:   No data found for Temp     BP Readings from Last 6 Encounters:   02/22/18 121/70   01/11/13 111/65     Pulse Readings from Last 4 Encounters:   02/22/18 89   01/11/13 69     Resp Readings from Last 1 Encounters:   01/11/13 16     SpO2 Readings from Last 1 Encounters:   No data found for SpO2                       .

## 2018-04-13 NOTE — PLAN OF CARE
Problem: Patient Care Overview  Goal: Plan of Care/Patient Progress Review  Outcome: No Change  Denies pain. PCA available. Due to ambulate. Liz Mccloud. SARA @150. NPO

## 2018-04-13 NOTE — BRIEF OP NOTE
[facility] Brief Operative Note    Pre-operative diagnosis: other  complications of other bariatric procedure    Post-operative diagnosis Lap Band Slip and Tubing Disconnection from Port     Procedure: Procedure(s):  LAPAROSCOPIC REMOVAL AND REPLACEMENT GASTRIC BAND DEVICE AND PORT  - Wound Class: I-Clean   Surgeon(s): Surgeon(s) and Role:     * Larry Shearer MD - Primary     * Simon Rm PA-C - Assisting   Estimated blood loss: 20 mL   Specimens: * No specimens in log *   Findings: Anterior slippage of band.  Also, tubing disconnected from port  See Operative Report for full details.  No complications noted.

## 2018-04-13 NOTE — ANESTHESIA CARE TRANSFER NOTE
Patient: Belkys Mosley    Procedure(s):  LAPAROSCOPIC REMOVAL AND REPLACEMENT GASTRIC BAND DEVICE AND PORT  - Wound Class: I-Clean    Diagnosis: other  complications of other bariatric procedure   Diagnosis Additional Information: No value filed.    Anesthesia Type:   General, ETT     Note:  Airway :Face Mask  Patient transferred to:PACU  Comments: Neuromuscular blockade reversed after TOF 4/4, spontaneous respirations, adequate tidal volumes, followed commands to voice, oropharynx suctioned with soft flexible catheter, extubated atraumatically, extubated with suction, airway patent after extubation.  Oxygen via facemask at 6 liters per minute to PACU. Oxygen tubing connected to wall O2 in PACU, SpO2, NiBP, and EKG monitors and alarms on and functioning, Riley Hugger warmer connected to patient gown, report on patient's clinical status given to PACU RN, RN questions answered. Handoff Report: Identifed the Patient, Identified the Reponsible Provider, Reviewed the pertinent medical history, Discussed the surgical course, Reviewed Intra-OP anesthesia mangement and issues during anesthesia, Set expectations for post-procedure period and Allowed opportunity for questions and acknowledgement of understanding      Vitals: (Last set prior to Anesthesia Care Transfer)    CRNA VITALS  4/13/2018 0940 - 4/13/2018 1019      4/13/2018             Resp Rate (observed): (!)  2                Electronically Signed By: AIDEN Hurley CRNA  April 13, 2018  10:19 AM

## 2018-04-13 NOTE — IP AVS SNAPSHOT
[facility]    [address] [place] [address]    Phone:  [phone]                                       After Visit Summary   4/13/2018    Belkys Mosley    MRN: [id number]           After Visit Summary Signature Page     I have received my discharge instructions, and my questions have been answered. I have discussed any challenges I see with this plan with the nurse or doctor.    ..........................................................................................................................................  Patient/Patient Representative Signature      ..........................................................................................................................................  Patient Representative Print Name and Relationship to Patient    ..................................................               ................................................  Date                                            Time    ..........................................................................................................................................  Reviewed by Signature/Title    ...................................................              ..............................................  Date                                                            Time

## 2018-04-14 ENCOUNTER — APPOINTMENT (OUTPATIENT)
Dept: GENERAL RADIOLOGY | Facility: CLINIC | Age: 28
End: 2018-04-14
Attending: PHYSICIAN ASSISTANT
Payer: COMMERCIAL

## 2018-04-14 VITALS
OXYGEN SATURATION: 96 % | RESPIRATION RATE: 16 BRPM | HEART RATE: 66 BPM | SYSTOLIC BLOOD PRESSURE: 126 MMHG | TEMPERATURE: 98.7 F | DIASTOLIC BLOOD PRESSURE: 57 MMHG

## 2018-04-14 LAB
ANION GAP SERPL CALCULATED.3IONS-SCNC: 9 MMOL/L (ref 3–14)
CHLORIDE SERPL-SCNC: 108 MMOL/L (ref 94–109)
CO2 SERPL-SCNC: 24 MMOL/L (ref 20–32)
CREAT SERPL-MCNC: 0.63 MG/DL (ref 0.52–1.04)
GFR SERPL CREATININE-BSD FRML MDRD: >90 ML/MIN/1.7M2
GLUCOSE BLDC GLUCOMTR-MCNC: 77 MG/DL (ref 70–99)
HGB BLD-MCNC: 9.4 G/DL (ref 11.7–15.7)
POTASSIUM SERPL-SCNC: 3.3 MMOL/L (ref 3.4–5.3)
SODIUM SERPL-SCNC: 141 MMOL/L (ref 133–144)

## 2018-04-14 PROCEDURE — 80051 ELECTROLYTE PANEL: CPT | Performed by: PHYSICIAN ASSISTANT

## 2018-04-14 PROCEDURE — 40000986 XR UPPER GI WATER SOLUBLE

## 2018-04-14 PROCEDURE — 85018 HEMOGLOBIN: CPT | Performed by: PHYSICIAN ASSISTANT

## 2018-04-14 PROCEDURE — 25000128 H RX IP 250 OP 636: Performed by: PHYSICIAN ASSISTANT

## 2018-04-14 PROCEDURE — 36415 COLL VENOUS BLD VENIPUNCTURE: CPT | Performed by: PHYSICIAN ASSISTANT

## 2018-04-14 PROCEDURE — 25000132 ZZH RX MED GY IP 250 OP 250 PS 637: Performed by: PHYSICIAN ASSISTANT

## 2018-04-14 PROCEDURE — 82565 ASSAY OF CREATININE: CPT | Performed by: PHYSICIAN ASSISTANT

## 2018-04-14 PROCEDURE — 00000146 ZZHCL STATISTIC GLUCOSE BY METER IP

## 2018-04-14 RX ORDER — OXYCODONE HYDROCHLORIDE 5 MG/1
5-10 TABLET ORAL
Qty: 20 TABLET | Refills: 0 | Status: CANCELLED | OUTPATIENT
Start: 2018-04-14

## 2018-04-14 RX ORDER — OXYCODONE HCL 5 MG/5 ML
5 SOLUTION, ORAL ORAL EVERY 4 HOURS PRN
Status: DISCONTINUED | OUTPATIENT
Start: 2018-04-14 | End: 2018-04-14 | Stop reason: HOSPADM

## 2018-04-14 RX ORDER — OXYCODONE HCL 5 MG/5 ML
5 SOLUTION, ORAL ORAL EVERY 4 HOURS PRN
Qty: 150 ML | Refills: 0 | Status: SHIPPED | OUTPATIENT
Start: 2018-04-14 | End: 2018-04-23

## 2018-04-14 RX ORDER — POTASSIUM CHLORIDE 1.5 G/1.58G
20-40 POWDER, FOR SOLUTION ORAL
Status: DISCONTINUED | OUTPATIENT
Start: 2018-04-14 | End: 2018-04-14 | Stop reason: HOSPADM

## 2018-04-14 RX ORDER — POTASSIUM CHLORIDE 1500 MG/1
20-40 TABLET, EXTENDED RELEASE ORAL
Status: DISCONTINUED | OUTPATIENT
Start: 2018-04-14 | End: 2018-04-14 | Stop reason: HOSPADM

## 2018-04-14 RX ADMIN — OXYCODONE HYDROCHLORIDE 5 MG: 5 SOLUTION ORAL at 14:10

## 2018-04-14 RX ADMIN — KETOROLAC TROMETHAMINE 30 MG: 30 INJECTION, SOLUTION INTRAMUSCULAR at 12:07

## 2018-04-14 RX ADMIN — DIATRIZOATE MEGLUMINE AND DIATRIZOATE SODIUM 15 ML: 660; 100 SOLUTION ORAL; RECTAL at 09:03

## 2018-04-14 RX ADMIN — CEFAZOLIN SODIUM 2 G: 2 INJECTION, SOLUTION INTRAVENOUS at 01:39

## 2018-04-14 RX ADMIN — POTASSIUM CHLORIDE 40 MEQ: 1500 TABLET, EXTENDED RELEASE ORAL at 15:43

## 2018-04-14 RX ADMIN — SODIUM CHLORIDE, POTASSIUM CHLORIDE, SODIUM LACTATE AND CALCIUM CHLORIDE: 600; 310; 30; 20 INJECTION, SOLUTION INTRAVENOUS at 01:41

## 2018-04-14 RX ADMIN — KETOROLAC TROMETHAMINE 30 MG: 30 INJECTION, SOLUTION INTRAMUSCULAR at 06:14

## 2018-04-14 NOTE — DISCHARGE SUMMARY
[facility] Discharge Summary    Belkys Mosley MRN# [id number]   Age: 28 year old YOB: 1990     Date of Admission:  4/13/2018  Date of Discharge:  4/14/2018  4:23 PM  Admitting Provider:  Larry Shearer MD  Discharge Provider:  Kvein Collado PA-C  Discharging Service: Bariatric Surgery     Primary Provider: Renay Mares  Primary Care Physician Phone Number: [phone]          Admission Diagnoses:   Principle Diagnosis:   History of obesity  Laparoscopic gastric band device failure         Discharge Diagnosis:     Same         Procedures:   Procedure(s): #1 diagnostic laparoscopy,   #2 laparoscopic removal of gastric band,   #3 takedown of prior gastro-gastric plication,   #4 laparoscopic placement of new adjustable gastric band sized AP standard, #4 removal of prior lap band port          Brief History of Illness:     Belkys Mosley is a 28 year old-year-old female who had a laparoscopic gastric band placed 9 years ago. The band recently failed.  Removal and replacement was indicated.  All questions were answered and she wished to proceed.          Hospital Course:     Belkys Mosley's hospital course was unremarkable.  She recovered as anticipated and experienced no post-operative complications. An UGI was obtained the day after surgery which showed delayed transition through the GE junction at the band.  Diet was advanced per protocol and she was discharged on POD 1. She was tolerating a bariatric full liquid diet, had adequate pain control on oral medications, was ambulating independently and was afebrile. She verbalized understanding of all discharge instructions and felt comfortable with the discharge plan.  She will follow up at the [facility] next week and was asked to call with any further questions or concerns.           Consultations:     Dietician          Image Results From This Hospital Stay:        Results for orders placed or performed during the  hospital encounter of 04/13/18   XR Upper GI Water Soluble    Narrative    UPPER GI WATER SOLUBLE  4/14/2018 9:04 AM     COMPARISON: None.    HISTORY: Status post Lap Band replacement (previous anterior  slippage).    PROCEDURE: The patient took one swallow of water-soluble contrast  during video fluoroscopic evaluation of the gastroesophageal junction.        Impression    IMPRESSION: There is a Lap Band in place. There is a slow trickle of  contrast material through the Lap Band with marked delay in emptying  of the esophagus. There is no evidence for leak of contrast from the  enteric lumen.              Discharge Medications:     Current Discharge Medication List      START taking these medications    Details   oxyCODONE (ROXICODONE) 5 MG/5ML solution Take 5 mLs (5 mg) by mouth every 4 hours as needed for moderate to severe pain  Qty: 150 mL, Refills: 0    Associated Diagnoses: Acute post-operative pain         CONTINUE these medications which have CHANGED    Details   ranitidine (ZANTAC) 150 MG tablet Take 1 tablet (150 mg) by mouth 2 times daily  Qty: 60 tablet, Refills: 2    Associated Diagnoses: Gastric band slippage         CONTINUE these medications which have NOT CHANGED    Details   hydrocortisone 2.5 % cream Apply topically daily as needed for other (Rosecea to ears)      multivitamin, therapeutic with minerals (MULTI-VITAMIN) TABS tablet Take 1 tablet by mouth daily      calcium carbonate (TUMS) 500 MG chewable tablet Take 2 chew tab by mouth every 4 hours as needed for heartburn      levonorgestrel-ethinyl estradiol (NORDETTE) 0.15-30 MG-MCG per tablet Take 1 tablet by mouth daily                  Condition on Discharge:      Discharge condition: Stable   Discharge vitals: Blood pressure 126/57, pulse 66, temperature 98.7  F (37.1  C), temperature source Oral, resp. rate 16, SpO2 96 %.           Discharge Disposition:     Discharged to home          Discharge Instructions and Follow-Up:        Belkys MCGEE  Melany was asked to follow up with the [facility] within 1 week.  She will return in 2 weeks for further counseling with a Registered Dietician.      After Care Instructions     Activity       Avoid lifting over 20 lbs and strenuous physical activity for 3 weeks.  Ok to shower, but avoid submersing the incision for 2 weeks from surgery.  May apply ice to operative site as needed for comfort; alternating 10 minutes on/off.            Diet       Continue recommendations as directed by dietician            Discharge Instructions       [facility] as scheduled and dietician the following week.  Call [phone] with any questions.  Do not consume alcohol or drive while you are on pain medications.  Avoid NSAIDs (such as ibuprofen, naproxen, or aspirin).  You may hold your vitamins/supplements if you are having nausea, try to resume your chewable vitamin if you are able to.  You may take a chewable calcium in place of the tablet for 1-2 months.  Continue to ambulate and use your incentive spirometer at home.                            Kevin Collado PA-C  Office [phone]

## 2018-04-14 NOTE — PLAN OF CARE
Problem: Patient Care Overview  Goal: Plan of Care/Patient Progress Review  Outcome: Improving  Patient A&O. VSS on RA. CMS intact. Dressing CDI, abdominal binder in place. Hypoactive bowel sounds, no flatus. NPO with ice chips until UGI complete. PCA for pain. IVF infusing. London out this AM. Up SBA. Will continue to monitor.

## 2018-04-14 NOTE — PLAN OF CARE
Problem: Bariatric Surgery (Adult,Pediatric)  Goal: Signs and Symptoms of Listed Potential Problems Will be Absent, Minimized or Managed (Bariatric Surgery)  Signs and symptoms of listed potential problems will be absent, minimized or managed by discharge/transition of care (reference Bariatric Surgery (Adult,Pediatric) CPG).   Outcome: No Change  A/Ox4. VSS. Refused capno. LS clear. BS hypo, no flatus. London patent with adequate output. Upper abd port site saturated dressing. Dressing changed. Abd binder in place. Managing pain with PCA. NPO with ice chips. Ambulating in halls SBA. Upper GI in am.

## 2018-04-14 NOTE — OP NOTE
Preoperative diagnosis: Malfunctioning lap band with anterior slippage    Postoperative diagnosis: Same    Procedure: #1 diagnostic laparoscopy, #2 laparoscopic removal of gastric band, #3 takedown of prior gastro-gastric plication, #4 laparoscopic placement of new adjustable gastric band sized AP standard, #4 removal of prior lap band port    Surgeon: Larry Shearer MD    Assistant: Omar Rm PA-C, Physician assistant first assistant was necessary during the performance of this procedure for expertise in patient positioning, prepping, draping, trocar placement, camera management, retraction and exposure, and suctioning.    Anesthesia: General endotracheal    Estimated blood loss: 10 cc    Specimens: Lap band and port, cleaned and delivered to patient at her request    Indication for procedure: This is a 28-year-old female who previously underwent the laparoscopic placement of adjustable gastric band.  She has had excellent weight loss with this.  She had required the replacement of her port due to presumed system leakage.  Since then the port has inexplicably become disconnected from the band with the band tubing lying down in the pelvis.  She has had significant pelvic pain ever since this incident.  She is also been evaluated and found to have an anterior slippage.  Extensive discussion was undertaken with the patient regarding her management options.  She ultimately wished to have the previous band removed, the anterior slip prepared and a new band to be placed.  The risks of this were thoroughly discussed including but not limited to bleeding, infection, recurrent slippage, erosion, visceral injury.  Her questions were all answered and she elected to proceed with surgery.    Procedure: After informed consent was obtained the patient was taken the operating room and placed supine on the operating table.  Following the induction of adequate general endotracheal anesthesia a London catheter was placed using  aseptic technique and the patient's abdomen was prepped and draped in usual manner.  A surgeon initiated timeout was then completed and 2 g of IV Ancef were administered.  A skin incision was then made to the left of the umbilicus in the mid abdomen through which a 5 mm optical trocar was used to gain access to the abdominal cavity.  A carbon dioxide pneumoperitoneum was then established.  Under direct vision an 12 mm port was placed in the right subcostal position and a 5 monitor port was placed to the right of midline in the upper abdomen.  The patient was placed in reverse Trendelenburg.  I then reflected the left lobe of liver superiorly and examined the upper abdomen.  The tubing for her band could indeed be seen lying straight down into the pelvis and disconnected from the port itself.  The upper abdomen and gastric pouch were seen and this was quite bulbous and distended.  There was obvious anterior slippage.  The pouch itself was also densely adherent to the underside of the left lobe of liver.  These adhesions to the underside of the liver were taken down using a combination of harmonic scalpel and electrocautery.  Once the left lobe liver was completely dissected free from the gastric pouch the Katerine liver retractor was placed through a subxiphoid stab incision and used to elevate the left lobe of the liver.  An additional 5 mm port was then placed in the left anterior axillary line.  Adhesions surrounding the band were then taken down using electrocautery.  This allowed me to expose the buccal for the previous band.  This was then cut.  I then continued to dissect around the band itself exposing the gastric gastric plication associated with this initial band.  I began to take this down although the dissection line between the plication and the pouch was difficult to identify.  The pouch itself was decompressed through an oral gastric tube.  Once this was completed I then removed the band from its  tract surrounding the stomach.  I then thoroughly dissected out the remaining portion of the previous gastro-gastric plication.  I exchanged my 5 mm port to the right of midline in the upper abdomen for a 15 mm port.  I elected to take down the remaining portion of the gastric gastric plication using a stapler.  The Applicasa a powered stapler with 60 mm purple Tri load stapler with staple line reinforcement was then used to completely takedown this gastro gastric plication.  Once this was accomplished I could then easily passed the banding finger posterior to the stomach from the right side to the left.  I utilized harmonic scalpel to take down additional portions of the gastric fundus for the new plication that would be performed upon placement of a new band.  The old pad was then removed from the abdomen without complication.  A new LAP-BAND size AP standard was then prepped on the back table.  This was introduced into the abdominal cavity.  Using the banding finger I easily passed this posterior to the upper stomach in the old tract.  The band itself was then closed around the uppermost portion of the stomach superior to the previous location in a manner that was above the area that had previously slipped anteriorly.  With the band buckled and in good position a new gastro-gastric plication was performed with multiple interrupted sutures of 0 silk.  The band appeared somewhat snug but I felt that it was appropriate given the significant amount of chronic thickening of the tissue in this upper gastric pouch.  The tubing was then brought out through the 15 mm port.  The upper abdominal cavity was then copiously lavaged until the effluent was essentially clear.  I then removed all of the laparoscopic ports and the carbon dioxide was massaged from the abdomen.  At the 15 mm port site a pocket was created for the LAP-BAND port.  The tubing was cut to appropriate length and secured to the port itself.  The port was then  secured to the abdominal wall fascia with interrupted sutures of 2-0 Ethibond.  The fascia at the port site was then closed around the port tubing with interrupted 0 Vicryl suture.  The patient's previous port in the right upper quadrant basically on the costal margin was then excised.  A transverse incision was made overlying the prior scar and dissection was carried into the subcutaneous tissue.  The capsule around the port was entered and the port was mobilized and excised with the capsule.  Local anesthetic was then injected at all of the incision sites.  The new port site was closed in a multilayer fashion using 3-0 Vicryl in the subcu and subcuticular 4-0 Vicryl in the skin.  The remaining incision sites were all closed with 4-0 Vicryl suture in a subcuticular fashion.  Steri-Strips and appropriate dressings were applied.  Sponge, needle and instrument counts are correct.  Patient tolerated this without difficulty.  She was awakened in the operating room, extubated and taken to the recovery room in a stable condition.    Larry Shearer MD

## 2018-04-14 NOTE — PLAN OF CARE
Problem: Patient Care Overview  Goal: Plan of Care/Patient Progress Review  Outcome: Completed Date Met: 04/14/18  VSS. A&O. Pain control with oxycodone PRN. Tolerating bariatric clears. Abdominal incisions and lap sites CDI. DC instructions, medications and follow up reviewed with pt. Questions answered.

## 2018-04-14 NOTE — PROGRESS NOTES
[facility]  Bariatric Surgery Progress Note    Admission Date: 2018         Assessment and Plan:   Belkys Mosley is a 28 year old female S/P Procedure(s):  LAPAROSCOPIC REMOVAL GASTRIC ADJUSTABLE BAND, PLACEMENT OF GASTRIC ADJUSTABLE BAND, 1 Day Post-Op.    - UGI OK, start clears and PO meds  - Start PO pain meds  - Hgb acceptable, no lovenox.  - Appreciate Pharmacy and Nutrition assistance  - Encourage ambulation and IS  - Home later today if tolerating diet, and pain controlled             Interval History:   Sore but pain controlled  Ambulating some.  No nausea, no emesis.   UGI went well - delay in transition at [facility]. No extrav.   Meds reviewed.                     Physical Exam:   Blood pressure 126/57, pulse 66, temperature 98.7  F (37.1  C), temperature source Oral, resp. rate 16, SpO2 96 %.  Temperature Temp  Av.6  F (37  C)  Min: 98.1  F (36.7  C)  Max: 99.2  F (37.3  C)   I/O last 3 completed shifts:  In: 4422 [P.O.:100; I.V.:4322]  Out: 665 [Urine:645; Other:20]  Constitutional:  Awake, alert, oriented, and in no apparent distress.   Lungs: No increased work of breathing, good air exchange, clear to auscultation bilaterally, and no crackles or wheezing.   Cardiovascular: Regular rate and rhythm, normal S1 and S2, and no murmur noted.   Abdomen: Bowel sounds present, soft, non-distended, appropriately tender at incision. Binder present.    Wounds: Clean, dry, and intact. No erythema or drainage.    Extremities: No edema or calf tenderness. +SCDs.          Data:     Results for orders placed or performed during the hospital encounter of 18 (from the past 24 hour(s))   Platelet count   Result Value Ref Range    Platelet Count 325 150 - 450 10e9/L   Creatinine   Result Value Ref Range    Creatinine 0.83 0.52 - 1.04 mg/dL    GFR Estimate 81 >60 mL/min/1.7m2    GFR Estimate If Black >90 >60 mL/min/1.7m2   Nutrition Services Adult IP Consult    Narrative    Gold  Nancy BROOKE RD, LD     4/14/2018 11:03 AM  Received MD consult for post-bariatric surgery consult.  Pt had lap band surgery 9 years ago, had removal and replacement   of the gastric band yesterday.  Met briefly with pt this morning. She has met with the bariatric   dietitian prior to surgery and was given complete diet education   and dates to advance diet.  Pt also has a follow-up appointment with the RD already   scheduled.    Nancy Malcolm RD  Pager [phone] (M-F)            [phone] (W/E & Hol)      Glucose by meter   Result Value Ref Range    Glucose 77 70 - 99 mg/dL   Hemoglobin   Result Value Ref Range    Hemoglobin 9.4 (L) 11.7 - 15.7 g/dL   Electrolyte panel   Result Value Ref Range    Sodium 141 133 - 144 mmol/L    Potassium 3.3 (L) 3.4 - 5.3 mmol/L    Chloride 108 94 - 109 mmol/L    Carbon Dioxide 24 20 - 32 mmol/L    Anion Gap 9 3 - 14 mmol/L   Creatinine   Result Value Ref Range    Creatinine 0.63 0.52 - 1.04 mg/dL    GFR Estimate >90 >60 mL/min/1.7m2    GFR Estimate If Black >90 >60 mL/min/1.7m2   XR Upper GI Water Soluble    Narrative    UPPER GI WATER SOLUBLE  4/14/2018 9:04 AM     COMPARISON: None.    HISTORY: Status post Lap Band replacement (previous anterior  slippage).    PROCEDURE: The patient took one swallow of water-soluble contrast  during video fluoroscopic evaluation of the gastroesophageal junction.        Impression    IMPRESSION: There is a Lap Band in place. There is a slow trickle of  contrast material through the Lap Band with marked delay in emptying  of the esophagus. There is no evidence for leak of contrast from the  enteric lumen.       Kevin Collado PA-C  Office: [phone]  Pager: [phone]

## 2018-04-14 NOTE — DISCHARGE INSTRUCTIONS
For informational purposes only. Not to replace the advice of your health care provider. Copyright   2006 [facility]. All rights reserved. [place] [id number] - REV 09/14  After Bariatric Surgery  [facility]  Note: Before you go home, ask your nurse to order your pain medicine from the pharmacy. Be sure you have your medicine with you when you leave.  How much fluid should I drink?    Drink at least 1 ounce of fluid every 15 minutes (1/2 cup per hour) during the day.     Carry a water bottle with you. Drink from it often.    Keep track of how much fluid you drink in a day.    Adjustable stomach band: Do not overeat or drink too much. This can cause vomiting (throwing up), stretch your stomach, or make your stomach slip up over your band.    Remember:  - Do not use straws, chew gum or suck on hard candies. They may cause painful gas.   - No cold drinks.  - No coffee, soda pop or drinks with caffeine. These may cause stomach pain.  - No alcohol. It is bad for your liver and will cause stomach pain. It also adds a lot of calories.  What can I do for pain control?      You had major abdominal surgery that involves all layers of your abdominal muscles.   Pain is expected, even as far out as 6-8 weeks postop.  Moving, sneezing, coughing, and  breathing will cause pain because these activities use your abdominal muscles.      You can take the liquid pain medicine as prescribed. You can also try to wean off from it  as soon as you feel comfortable.  Do not drive while you are taking pain medicine.   This is dangerous.     You may take liquid Tylenol (acetaminophen) for pain in place of the prescribed pain  medicine. Do not take more than 3000 mg in a 24 hour period.     You may also apply ice or heat to the affected area(s).  Just remember to wrap the ice  in something and limit icing sessions to 20 minutes. Excessive icing can irritate the skin  or cause tissue damage.   You can apply  heat with a hot, wet towel or heating pad. Just like cold therapy, limit  heat application to 20 minutes. Never sleep with a heating pad on. It could cause severe  burns to your skin.    Do not take NSAID s (ibuprofen, Motrin, Advil, Aleve, Naproxen). They will increase you risk for bleeding or getting an ulcer.    If you have any of the following pain issues, we would like to talk to you:  - pain that does not improve with rest  - pain that gets worse and worse  - pain that is not controlled by your pain medicine  - a sudden severe increase in pain  -   If your doctor prescribes Zantac, take it as ordered. Take it for 3 months to prevent       Ulcers.  -   If you took an antacid before you had surgery, keep taking it for 3 months after           surgery. This will help prevent ulcers.   - Take any other medicines that your doctor tells you to take.   - Wear your binder to support your belly muscles.  Please call the clinic at [phone] for any concerns      How much rest do I need?  Get plenty of rest the first few days after surgery. Balance rest and activity.  Do not nap more than one hour during the day. Set a timer to wake yourself up, if needed. Too much sleep will keep you from drinking enough fluid during the day.  What should I know about my incisions (cuts)?  - Change your bandages as needed or if they get wet.   -Call your doctor if you have any of these signs of infection:   - Redness around the site.   - Drainage that smells bad.   - Fever of 101  F (38.3  C) or higher when taken under the tongue.- Chills.  If you     have a drain:  - Do not pull on the drain. Do not pull the drain out. We will take out your drain at your first clinic visit.  - The color and amount of fluid varies. Right after surgery the fluid is bright red. Over time, it changes to light pink and may become clear or the color of straw.   Will my urine or bowel movements change?   You might not have a bowel movement for several days  after surgery. Your first bowel movements will likely be liquid.   Gastric bypass or sleeve gastrectomy: You may also notice old blood or a darker color in your stools (bowel movements).   Your urine should be clear to light yellow. This shows that you are drinking enough fluid. You should urinate (pass water) at least 2 to 3 times during the day. If not, call us.  What kind of activity is safe?  For 4 weeks after surgery:     Walk for a short time every day.    Do not jog or run.    No weight lifting or belly exercises.  No swimming, baths or hot tubs until your cuts are healed (scabs are gone). You may shower.  No outside activity in hot, humid weather until you can drink 48 to 64 ounces of fluid in 24 hours. If you sweat a lot, your body may lose too much water.   The first month after surgery, do not take any trips where you must sit for a long time. You could get a blood clot in your legs.  Call the clinic at [phone] if:    Your pain medicine is not working.    You do not urinate (pass water) 2 to 3 times per day.    You have any signs of infection.    You have belly pain that gets worse and worse.    You have swollen legs with pain behind the knee or calf.    You have chest pain or feel very short of breath.    You have any questions or concerns.    You have a sudden severe increase in heart rate.    You have vomiting that gets worse and worse.  When should I go back to the clinic?  Time Gastric bypass or sleeve gastrectomy Adjustable gastric band   Week 1 See the physician or physician assistant (PA). See the nurse and physical therapist.   Week 2 See the nurse and dietitian. See the nurse and dietitian.   Week 4 Have a nutrition consult with the dietitian. See the PA and dietitian.   Week 6  Go for the first adjustment (fill) of your stomach band.   For the first year (or until your BMI is less than 30) Go to the clinic each month to see if you need a band adjustment (fill).

## 2018-04-14 NOTE — CONSULTS
Received MD consult for post-bariatric surgery consult.  Pt had lap band surgery 9 years ago, had removal and replacement of the gastric band yesterday.  Met briefly with pt this morning. She has met with the bariatric dietitian prior to surgery and was given complete diet education and dates to advance diet.  Pt also has a follow-up appointment with the RD already scheduled.    Nancy Malcolm RD  Pager [phone] (M-F)            [phone] (W/E & Hol)

## 2018-04-17 ENCOUNTER — OFFICE VISIT (OUTPATIENT)
Dept: SURGERY | Facility: CLINIC | Age: 28
End: 2018-04-17
Payer: COMMERCIAL

## 2018-04-17 VITALS
SYSTOLIC BLOOD PRESSURE: 132 MMHG | BODY MASS INDEX: 25.92 KG/M2 | DIASTOLIC BLOOD PRESSURE: 91 MMHG | WEIGHT: 146.3 LBS | OXYGEN SATURATION: 100 % | RESPIRATION RATE: 14 BRPM | TEMPERATURE: 98.1 F | HEART RATE: 79 BPM

## 2018-04-17 DIAGNOSIS — Z98.84 BARIATRIC SURGERY STATUS: Primary | ICD-10-CM

## 2018-04-17 DIAGNOSIS — K95.09 GASTRIC BAND SLIPPAGE: ICD-10-CM

## 2018-04-17 PROCEDURE — 99024 POSTOP FOLLOW-UP VISIT: CPT | Performed by: PHYSICIAN ASSISTANT

## 2018-04-17 PROCEDURE — 99207 ZZC NO CHARGE NURSE ONLY: CPT

## 2018-04-17 NOTE — MR AVS SNAPSHOT
After Visit Summary   4/17/2018    Belkys Mosley    MRN: [id number]           Patient Information     Date Of Birth          1990        Visit Information        Provider Department      4/17/2018 1:30 PM Rn, Beronica Tejada, RN [facility]      Today's Diagnoses     Bariatric surgery status    -  1    BMI 26.0-26.9,adult           Follow-ups after your visit        Your next 10 appointments already scheduled     Apr 26, 2018 11:00 AM CDT   Post Op with Beronica Tejada Rn, RN   [facility] ([facility])    [address]  [address]  [facility] [address]   [phone]            Apr 26, 2018 11:30 AM CDT   Post Op with Beronica Wl Diet 1, RD   [facility] ([facility])    [address]  [facility] [address] [phone]            May 14, 2018  9:00 AM CDT   Post Op with Beronica Wl Diet 1, RD   [facility] ([facility])    [address]  [address]  [facility] [address] [phone]            May 21, 2018  9:00 AM CDT   PROCEDURE with Dasha Peñaloza PA-C   [facility] ([facility])    [facility] [address] [phone]              Who to contact     If you have questions or need follow up information about today's clinic visit or your schedule please contact [facility] [facility] directly at [phone].  Normal or non-critical lab and imaging results will be communicated to you by MyChart, letter or phone within 4 business days after the clinic has received the results. If you do not hear from us within 7 days, please contact the clinic through MyChart or phone. If you have a critical or abnormal lab  "result, we will notify you by phone as soon as possible.  Submit refill requests through Blyk or call your pharmacy and they will forward the refill request to us. Please allow 3 business days for your refill to be completed.          Additional Information About Your Visit        24[employer] Learning[employer] Information     Blyk lets you send messages to your doctor, view your test results, renew your prescriptions, schedule appointments and more. To sign up, go to [email or web address] . Click on \"Log in\" on the left side of the screen, which will take you to the Welcome page. Then click on \"Sign up Now\" on the right side of the page.     You will be asked to enter the access code listed below, as well as some personal information. Please follow the directions to create your username and password.     Your access code is: [email or web address]  Expires: 2018  8:29 AM     Your access code will  in 90 days. If you need help or a new code, please call your [place] clinic or [phone].        Care EveryWhere ID     This is your Care EveryWhere ID. This could be used by other organizations to access your [place] medical records  [id number]        Your Vitals Were     Last Period                   2018            Blood Pressure from Last 3 Encounters:   18 (!) 132/91   18 126/57   18 121/70    Weight from Last 3 Encounters:   18 146 lb 4.8 oz (66.4 kg)   03/15/18 148 lb 14.4 oz (67.5 kg)   18 147 lb 5 oz (66.8 kg)              Today, you had the following     No orders found for display       Primary Care Provider Office Phone # Fax #    Renay MCGEE Vishnu [phone]       [facility] [address] [facility] [address]        Equal Access to Services     ANTIONETTE SONG : Cornelia Marrero, waasya luqgurwinder, qaybta kaalmajitendra kilpatrick, nelly gates. [facility] [phone].    ATENCIÓN: Si ministerio anguiano stewart " disposición servicios gratuitos de asistencia lingüística. Daniel chaudhari [phone].    We comply with applicable federal civil rights laws and [place] laws. We do not discriminate on the basis of race, color, national origin, age, disability, sex, sexual orientation, or gender identity.            Thank you!     Thank you for choosing [facility]  for your care. Our goal is always to provide you with excellent care. Hearing back from our patients is one way we can continue to improve our services. Please take a few minutes to complete the written survey that you may receive in the mail after your visit with us. Thank you!             Your Updated Medication List - Protect others around you: Learn how to safely use, store and throw away your medicines at www.disposemymeds.org.          This list is accurate as of 4/17/18  4:13 PM.  Always use your most recent med list.                   Brand Name Dispense Instructions for use Diagnosis    calcium carbonate 500 MG chewable tablet    TUMS     Take 2 chew tab by mouth every 4 hours as needed for heartburn        hydrocortisone 2.5 % cream      Apply topically daily as needed for other (Rosecea to ears)        levonorgestrel-ethinyl estradiol 0.15-30 MG-MCG per tablet    NORDETTE     Take 1 tablet by mouth daily        Multi-vitamin Tabs tablet      Take 1 tablet by mouth daily        oxyCODONE 5 MG/5ML solution    ROXICODONE    150 mL    Take 5 mLs (5 mg) by mouth every 4 hours as needed for moderate to severe pain    Acute post-operative pain       ranitidine 150 MG tablet    ZANTAC    60 tablet    Take 1 tablet (150 mg) by mouth 2 times daily    Gastric band slippage

## 2018-04-17 NOTE — PATIENT INSTRUCTIONS
Recommend starting tylenol gelcaps.  Take 1000 mg twice daily. Use ice/heat  If shoulder pain worsens seen medical attention  Start recommended antacid medication for the next 3 months.   Strive to drink 64 oz of fluid daily.  Strive to move hourly while awake to decrease risk of developing a blood clot.  Continue to do deep breathing exercises twice daily or use your spirometer.  Follow up with your primary care provider to discuss obesity related conditions by one month post op.   Start recommended postoperative vitamins within in the first 6 weeks.  Advance diet per Dietitian at your 2 week appointment.   Wear binder to support abdominal muscles and gradually wean off over the next few weeks.   Return to clinic for 2 wk post op appointment and bring post op vitamins along.  Call with questions or concerns at any time.

## 2018-04-17 NOTE — MR AVS SNAPSHOT
After Visit Summary   4/17/2018    Belkys Mosley    MRN: [id number]           Patient Information     Date Of Birth          1990        Visit Information        Provider Department      4/17/2018 1:50 PM Krystal Zimmer PA-C [facility]      Today's Diagnoses     Bariatric surgery status    -  1    BMI 25.0-25.9,adult        Gastric band slippage          Care Instructions    Recommend starting tylenol gelcaps.  Take 1000 mg twice daily. Use ice/heat  If shoulder pain worsens seen medical attention  Start recommended antacid medication for the next 3 months.   Strive to drink 64 oz of fluid daily.  Strive to move hourly while awake to decrease risk of developing a blood clot.  Continue to do deep breathing exercises twice daily or use your spirometer.  Follow up with your primary care provider to discuss obesity related conditions by one month post op.   Start recommended postoperative vitamins within in the first 6 weeks.  Advance diet per Dietitian at your 2 week appointment.   Wear binder to support abdominal muscles and gradually wean off over the next few weeks.   Return to clinic for 2 wk post op appointment and bring post op vitamins along.  Call with questions or concerns at any time.            Follow-ups after your visit        Your next 10 appointments already scheduled     Apr 26, 2018 11:00 AM CDT   Post Op with Beronica Tejada Rn, RN   [facility] [address] [address]   [phone]            Apr 26, 2018 11:30 AM CDT   Post Op with Beronica Quiñones RD   [facility] [address] [address]   [phone]            May 14, 2018  9:00 AM CDT   Post Op with Beronica Quiñones RD   [facility]  "[facility] [place] ([facility])    [address]  [facility] [address] [phone]            May 21, 2018  9:00 AM CDT   PROCEDURE with aDsha Peñaloza PA-C   [facility] ([facility])    [address] [address] [phone]              Who to contact     If you have questions or need follow up information about today's clinic visit or your schedule please contact [place] [facility] directly at [phone].  Normal or non-critical lab and imaging results will be communicated to you by BIOSAFEhart, letter or phone within 4 business days after the clinic has received the results. If you do not hear from us within 7 days, please contact the clinic through BIOSAFEhart or phone. If you have a critical or abnormal lab result, we will notify you by phone as soon as possible.  Submit refill requests through [employer] or call your pharmacy and they will forward the refill request to us. Please allow 3 business days for your refill to be completed.          Additional Information About Your Visit        BIOSAFEhart Information     [employer] lets you send messages to your doctor, view your test results, renew your prescriptions, schedule appointments and more. To sign up, go to [email or web address] . Click on \"Log in\" on the left side of the screen, which will take you to the Welcome page. Then click on \"Sign up Now\" on the right side of the page.     You will be asked to enter the access code listed below, as well as some personal information. Please follow the directions to create your username and password.     Your access code is: [email or web address]  Expires: 2018  8:29 AM     Your access code will  in 90 days. If you need help or a new code, please call your [place] clinic or [phone].        Care EveryWhere ID     This is your Care " EveryWhere ID. This could be used by other organizations to access your [place] medical records  [id number]        Your Vitals Were     Pulse Temperature Respirations Last Period Pulse Oximetry Breastfeeding?    79 98.1  F (36.7  C) 14 04/13/2018 100% No    BMI (Body Mass Index)                   25.92 kg/m2            Blood Pressure from Last 3 Encounters:   04/17/18 (!) 132/91   04/14/18 126/57   02/22/18 121/70    Weight from Last 3 Encounters:   04/17/18 146 lb 4.8 oz (66.4 kg)   03/15/18 148 lb 14.4 oz (67.5 kg)   02/22/18 147 lb 5 oz (66.8 kg)              Today, you had the following     No orders found for display       Primary Care Provider Office Phone # Fax #    Renay Mares [phone]       [facility] [address] [place] [address]        Equal Access to Services     ANTIONETTE SONG : Hadii gabriela ku hadasho Soomaali, waaxda luqadaha, qaybta kaalmada adeegyada, waxay patin haybhargavi lee . So [facility] [phone].    ATENCIÓN: Si habla español, tiene a stewart disposición servicios gratuitos de asistencia lingüística. Llame al [phone].    We comply with applicable federal civil rights laws and [place] laws. We do not discriminate on the basis of race, color, national origin, age, disability, sex, sexual orientation, or gender identity.            Thank you!     Thank you for choosing [place] [facility]  for your care. Our goal is always to provide you with excellent care. Hearing back from our patients is one way we can continue to improve our services. Please take a few minutes to complete the written survey that you may receive in the mail after your visit with us. Thank you!             Your Updated Medication List - Protect others around you: Learn how to safely use, store and throw away your medicines at www.disposemymeds.org.          This list is accurate as of 4/17/18  2:30 PM.  Always use your most recent med list.                    Brand Name Dispense Instructions for use Diagnosis    calcium carbonate 500 MG chewable tablet    TUMS     Take 2 chew tab by mouth every 4 hours as needed for heartburn        hydrocortisone 2.5 % cream      Apply topically daily as needed for other (Rosecea to ears)        levonorgestrel-ethinyl estradiol 0.15-30 MG-MCG per tablet    NORDETTE     Take 1 tablet by mouth daily        Multi-vitamin Tabs tablet      Take 1 tablet by mouth daily        oxyCODONE 5 MG/5ML solution    ROXICODONE    150 mL    Take 5 mLs (5 mg) by mouth every 4 hours as needed for moderate to severe pain    Acute post-operative pain       ranitidine 150 MG tablet    ZANTAC    60 tablet    Take 1 tablet (150 mg) by mouth 2 times daily    Gastric band slippage

## 2018-04-17 NOTE — PROGRESS NOTES
"[facility]   1 Week Surgical Follow-Up     PCP:  Renay Mares    DOS: 04/13/18  Surgeon: DENISA  Surgery Type: Band  HISTORY OF PRESENT ILLNESS:  Belkys Mosley returns today for her follow-up appointment status post bariatric surgery.  Overall she is doing well.  She is currently using Oxycodone for pain medication. Taking about 5 mls about 3 times daily.  Refill Needed: No.  Patient's Pain Scale: 4. The pain is located mid-abdomen.  Has some mild left shoulder pain.  Intermittent.   Currently not hurting.  Getting better.   Patient's current daily fluid intake in oz is: 50-64 oz mostly water .  Patient has not started postoperative Vitamins: No.  Activity:   Activity: walking dog and walking treadmill 10-15\" 1 time daily - Moving once per hour  REVIEW OF SYSTEMS:  GI:    Nausea: No  Vomiting: No  Diarrhea: Yes  Constipation: No  Last BM: yesterday 10 pm loose and dark  Dysphagia: No  GERD: No - has not started zantac    CV/Pulmonary:   Chest Pain: No  Dizzy: No  Light Headed: No  SOB: No - Using IS for 7-8 times daily  Endo:  Diabetes: No  :    Contraception: BCP  Dysuria: No  Vascular:    LE Edema: No  PHYSICAL EXAMINATION:    BP (!) 132/91 (BP Location: Left arm, Patient Position: Sitting, Cuff Size: Adult Regular)  Pulse 79  Temp 98.1  F (36.7  C)  Resp 14  Wt 146 lb 4.8 oz (66.4 kg)  LMP 04/13/2018  SpO2 100%  Breastfeeding? No  BMI 25.92 kg/m2    GENERAL: No acute distress. Alert and oriented time 3.  HEART: Regular rate and rhythm.  LUNGS:  Clear to auscultation bilaterally.  ABDOMEN: Soft, incisions clean,dry, and intact. Tenderness WNL for post op.  EXTREMITIES: No lower extremity edema bilaterally. No calf swelling or tenderness. Negative mony's  SKIN: No rashes.  PSYCHOLOGICAL: Stable. Pleasant    ASSESSMENT:    1. S/P bariatric surgery.  2. Post surgical malabsorption    PLAN:   Recommend starting tylenol gelcaps.  Take 1000 mg twice daily. Use ice/heat  If shoulder pain " worsens seen medical attention  Start recommended antacid medication for the next 3 months.   Strive to drink 64 oz of fluid daily.  Strive to move hourly while awake to decrease risk of developing a blood clot.  Continue to do deep breathing exercises twice daily or use your spirometer.  Follow up with your primary care provider to discuss obesity related conditions by one month post op.   Start recommended postoperative vitamins within in the first 6 weeks.  Advance diet per Dietitian at your 2 week appointment.   Wear binder to support abdominal muscles and gradually wean off over the next few weeks.   Return to clinic for 2 wk post op appointment and bring post op vitamins along.  Call with questions or concerns at any time.

## 2018-04-23 ENCOUNTER — TELEPHONE (OUTPATIENT)
Dept: SURGERY | Facility: CLINIC | Age: 28
End: 2018-04-23

## 2018-04-23 ENCOUNTER — OFFICE VISIT (OUTPATIENT)
Dept: SURGERY | Facility: CLINIC | Age: 28
End: 2018-04-23
Payer: COMMERCIAL

## 2018-04-23 VITALS
OXYGEN SATURATION: 100 % | WEIGHT: 142.6 LBS | BODY MASS INDEX: 25.26 KG/M2 | DIASTOLIC BLOOD PRESSURE: 71 MMHG | HEART RATE: 84 BPM | RESPIRATION RATE: 14 BRPM | TEMPERATURE: 97.7 F | SYSTOLIC BLOOD PRESSURE: 115 MMHG

## 2018-04-23 DIAGNOSIS — Z98.84 BARIATRIC SURGERY STATUS: Primary | ICD-10-CM

## 2018-04-23 DIAGNOSIS — Z98.84 BARIATRIC SURGERY STATUS: ICD-10-CM

## 2018-04-23 DIAGNOSIS — K95.09 GASTRIC BAND SLIPPAGE: ICD-10-CM

## 2018-04-23 PROCEDURE — 99207 ZZC NO CHARGE NURSE ONLY: CPT

## 2018-04-23 PROCEDURE — 99024 POSTOP FOLLOW-UP VISIT: CPT | Performed by: PHYSICIAN ASSISTANT

## 2018-04-23 PROCEDURE — 97803 MED NUTRITION INDIV SUBSEQ: CPT | Performed by: DIETITIAN, REGISTERED

## 2018-04-23 NOTE — MR AVS SNAPSHOT
"                  MRN:[id number]                      After Visit Summary   2018    Belkys Mosley    MRN: [id number]           Visit Information        Provider Department      2018 2:30 PM 2, Beronica Tejada Diet, RD [facility]      Your next 10 appointments already scheduled     May 14, 2018  9:00 AM CDT   Post Op with Beronica Tejada Diet 1, RD   [facility])    [address] [place] [id number] [phone]            May 21, 2018  9:00 AM CDT   PROCEDURE with Dasha Peñaloza PA-C   [facility])    [address] [place] [id number] [phone]              MyChart Information     [employer] lets you send messages to your doctor, view your test results, renew your prescriptions, schedule appointments and more. To sign up, go to [email or web address] . Click on \"Log in\" on the left side of the screen, which will take you to the Welcome page. Then click on \"Sign up Now\" on the right side of the page.     You will be asked to enter the access code listed below, as well as some personal information. Please follow the directions to create your username and password.     Your access code is: [email or web address]  Expires: 2018  8:29 AM     Your access code will  in 90 days. If you need help or a new code, please call your [place] clinic or [phone].        Care EveryWhere ID     This is your Care EveryWhere ID. This could be used by other organizations to access your [place] medical records  [id number]        Equal Access to Services     ANTIONETTE SONG : Hadii gabriela feltono Sotony, waaxda luqadaha, qaybta kaalmada adeegyajitendra, nelly gates. So [facility] [phone].    ATENCIÓN: Si habla español, tiene a stewart " disposición servicios gratuitos de asistencia lingüística. Llame al [phone].    We comply with applicable federal civil rights laws and [place] laws. We do not discriminate on the basis of race, color, national origin, age, disability, sex, sexual orientation, or gender identity.

## 2018-04-23 NOTE — TELEPHONE ENCOUNTER
Patient had removal of lap band and replacement of complete device on 4/13/18.  She was seen in clinic on 4/17/18 and doing well - no issues with incisions noted.    She calls today c/o incision site that port was taken out is bleeding.  She stated she called and talked with Dr. Shearer yesterday and was told to call today.    She states that yesterday she went through 4-5 gauze pads folded over - with blood showing through.  She states that the steri strips are still on but can't really see if the area is open.  She took a shower this am and the incision in still bleeding.    She has an appointment to see RN and RD this Thursday but doesn't want to wait that long to see someone.    Informed patient I would discuss with CRISTIANO Lou and get back to her today.  Patient verbalized understanding and is agreeable to plan.  Anyi Santiago, MS, RD, RN

## 2018-04-23 NOTE — MR AVS SNAPSHOT
After Visit Summary   4/23/2018    Belkys Mosley    MRN: [id number]           Patient Information     Date Of Birth          1990        Visit Information        Provider Department      4/23/2018 1:45 PM Rn, Beronica Tejada, RN [facility]      Today's Diagnoses     Bariatric surgery status    -  1    BMI 25.0-25.9,adult        Gastric band slippage           Follow-ups after your visit        Your next 10 appointments already scheduled     May 14, 2018  9:00 AM CDT   Post Op with Beronica Tejada Diet 1, RD   [facility] ([facility])    [address] [place] [id number] [phone]            May 21, 2018  9:00 AM CDT   PROCEDURE with Dasha Peñaloza PA-C   [facility] ([facility])    [address] [place] [phone]              Who to contact     If you have questions or need follow up information about today's clinic visit or your schedule please contact [place] [facility] directly at [phone].  Normal or non-critical lab and imaging results will be communicated to you by fabrikhart, letter or phone within 4 business days after the clinic has received the results. If you do not hear from us within 7 days, please contact the clinic through fabrikhart or phone. If you have a critical or abnormal lab result, we will notify you by phone as soon as possible.  Submit refill requests through BlockTrail or call your pharmacy and they will forward the refill request to us. Please allow 3 business days for your refill to be completed.          Additional Information About Your Visit        BlockTrail Information     BlockTrail lets you send messages to your doctor, view your test results, renew your prescriptions, schedule appointments and  "more. To sign up, go to [email or web address] . Click on \"Log in\" on the left side of the screen, which will take you to the Welcome page. Then click on \"Sign up Now\" on the right side of the page.     You will be asked to enter the access code listed below, as well as some personal information. Please follow the directions to create your username and password.     Your access code is: [email or web address]  Expires: 2018  8:29 AM     Your access code will  in 90 days. If you need help or a new code, please call your [place] clinic or [phone].        Care EveryWhere ID     This is your Care EveryWhere ID. This could be used by other organizations to access your [place] medical records  [id number]        Your Vitals Were     Pulse Temperature Respirations Last Period Pulse Oximetry Breastfeeding?    84 97.7  F (36.5  C) 14 2018 100% No    BMI (Body Mass Index)                   25.26 kg/m2            Blood Pressure from Last 3 Encounters:   18 115/71   18 115/71   18 (!) 132/91    Weight from Last 3 Encounters:   18 142 lb 9.6 oz (64.7 kg)   18 142 lb 9.6 oz (64.7 kg)   18 146 lb 4.8 oz (66.4 kg)              Today, you had the following     No orders found for display       Primary Care Provider Office Phone # Fax #    Renayaugusta Mares [phone]       [facility] [address] [facility] [address]        Equal Access to Services     ANTIONETTE SONG : Hadnatividad Marrero, marguerite dunbar, nelly laws. So [facility] [phone].    ATENCIÓN: Si habla español, tiene a stewart disposición servicios gratuitos de asistencia lingüística. Daniel al [phone].    We comply with applicable federal civil rights laws and [place] laws. We do not discriminate on the basis of race, color, national origin, age, disability, sex, sexual orientation, or gender identity.            Thank " you!     Thank you for choosing [place] [facility]  for your care. Our goal is always to provide you with excellent care. Hearing back from our patients is one way we can continue to improve our services. Please take a few minutes to complete the written survey that you may receive in the mail after your visit with us. Thank you!             Your Updated Medication List - Protect others around you: Learn how to safely use, store and throw away your medicines at www.disposemymeds.org.          This list is accurate as of 4/23/18 11:59 PM.  Always use your most recent med list.                   Brand Name Dispense Instructions for use Diagnosis    hydrocortisone 2.5 % cream      Apply topically daily as needed for other (Rosecea to ears)        levonorgestrel-ethinyl estradiol 0.15-30 MG-MCG per tablet    NORDETTE     Take 1 tablet by mouth daily        multivitamin  peds with iron 60 MG chewable tablet      Take 2 chew tab by mouth every morning        ranitidine 150 MG tablet    ZANTAC    60 tablet    Take 1 tablet (150 mg) by mouth 2 times daily    Gastric band slippage       VITAMIN D (CHOLECALCIFEROL) PO      Take 1,000 Units by mouth daily        VITRON-C PO      Take 1 tablet by mouth every morning

## 2018-04-23 NOTE — PROGRESS NOTES
April 23, 2018    Bariatric post op Visit    Pt was seen in clinic today a few days early for her 2 week appt.  for 2 weeks post appt.  She had removal and replacement of Lapband on 4/13/18.  She was seen in clinic bymy partner Krystal Zimmer PA-C in clinic last week on 4/17/18 and was doing well.  She had not no issues with incisions.    She called this morning c/o of dark colored blood that started appearing yesterday at her incision site where her old port was removed. She talked with doctor on call, YOCASTA, and was told to call today.     This morning she took a shower this am and the incision in still bleeding.  WE asked her to come in to take a look.  States yesterday she went through 4-5 gauze pads folded over - with dark red sticky blood showing through.  She denies fever, erythema around incision side.  Has slight tnederness on the incision mainly with palpation.      /71 (BP Location: Left arm, Patient Position: Sitting, Cuff Size: Adult Regular)  Pulse 84  Temp 97.7  F (36.5  C)  Resp 14  Wt 142 lb 9.6 oz (64.7 kg)  LMP 04/13/2018  SpO2 100%  Breastfeeding? No  BMI 25.26 kg/m2     GENERAL: No acute distress. Alert and oriented time 3.  LUNGS:  Breathing unlabored.   ABDOMEN: , soft, right upper incision showed some subq closure mainly intact.  Old blood present and oozing upon palpation.  Steri strips removed.  Wound wash applied. Pressure/ massage applied and  total of about 20-30 cc dark red blood/ clot removed.   Remaining incisions clean,dry, and intact. Tenderness WNL for post op.  EXTREMITIES: No lower extremity edema bilaterally. No calf swelling or tenderness. Negative mony's sign.  SKIN: No rashes.  PSYCHOLOGICAL: Stable. Pleasant    Assessment:  2 week post op   Wound Check  Hematoma    Plan:  In clinic dressing was removed from upper rt incision. Wound wash applied. Pressure massage applied and  total of about 20-30 cc dark red blood/ clot removed. PT stated wound felt better after  massage and old blood removed.     Wound was re-dressed was 4x4 gauze. Instructions given on wound dressing.  Pt was instructured to massage area in shower if needed daily. If bright red blood starts to occur or the pace quickens.  Pt to call or return to clinic.  Pt to follow up in 1 month for and to band assessment.

## 2018-04-23 NOTE — TELEPHONE ENCOUNTER
Spoke with CRISTIANO Lou - will see in clinic today.  Will also associate 2nd week PO RN and RD visit today so patient doesn't have to come back in 2 days.    Called patient and informed her that she will be seen by RN at 1345 by RN, at 1400 by CRISTIANO and at 1430 by RD and then not need to come in on Thursday.  Patient verbalized understanding and is agreeable to plan.  Anyi Santiago, MS, RD, RN

## 2018-04-23 NOTE — PROGRESS NOTES
"BARIATRIC PROGRESS NOTE - 2 Week Post Op  DATE OF VISIT: April 23, 2018    Belkys Mosley  1990  female  [id number]  28 year old    ASSESSMENT:    REASON FOR VISIT:  Belkys Mosley is a 28 year old year old female presents today for a 2 Week Post Op nutrition follow-up appointment. Patient is accompanied by mother, Melanie.      DIAGNOSIS:  Status: post laparoscopic band surgery  Overweight BMI 25-29.9    ANTHROPOMETRICS:  Height: 5'3\"    Initial weight: 238 lbs (9 years ago)  Current Weight: 142.6 lbs    BMI: 25.2  kg/(m^2).    VITAMINS AND MINERALS:   2 Multivitamin with Minerals  None but will start Calcium With Vitamin D   Vitamin D  Vitron C     NUTRITION HISTORY:  Tolerating diet: Bariatric full liquid diet  Fluids/water intake: 60-70 ounces/day  Milk intake: 2 ounces/day (Patient likes milk but is not drinking currently-willing to increase intake 2 cups/day)  Small bites: Yes  Portion size: <1/2 cup   20-30 minute meals: Yes  Fluids and meals  by 30 minutes: Yes  Chew foods thoroughly: Yes  Breakfast: yogurt  Lunch: pudding  Dinner: tomato soup  Additional Information: Patient 9 years post op from band.  Had band replaced due to slipping.  Patient states stands when watching tv and paces around her living room and kitchen as it feels better standing.  Patient's mother states that patient vomits after eating but patient states it's just if she eats too quickly.  Mother concerned that she is vomiting more than mother feels comfortable.  These behaviors have been before new band was placed.      PHYSICAL ACTIVITY:  Type: treadmill; walking   Frequency: 7 days a week  Duration: 30 minutes    DIAGNOSIS:   Previous Nutrition Diagnosis: Altered gastrointestinal function related to alternation in gastrointestinal structure as evidenced by history of laparoscopic band. (previous laparoscopic band surgery)  No change    Previous Goals:  N/A    Current Nutrition Diagnosis: Altered gastrointestinal function " related to alternation in gastrointestinal structure as evidenced by history of laparoscopic band.     INTERVENTION  Nutrition Prescription: Recommended bariatric puree diet.    Goals:  Start puree diet at day 14 post op.  Continue MVI, vitamin D, and Vitron C and start calcium with vitamin D.  Aim for 60 to 90 grams protein.  Continue 48 to 64 oz of fluid per day.    Implementation:  Discussed transition to puree diet.  Emphasized importance of adequate protein.  Reviewed required vitamins and mineral supplements.  Verbalizes good understanding of surgery diet guidelines.  Assessed learning needs and learning preferences.    NUTRITION MONITORING AND EVALUATION:   Monitor diet tolerance and weight loss  Check dose of vitamin D    Anticipated Compliance: fair-good  Follow Up: Continue to monitor patient closely regarding weight loss and diet.  At 4 weeks Post Op    TIME SPENT WITH PATIENT: 25 minutes    Bright Melendez, RD, LD  [facility] Outpatient Dietitian  [phone] (office phone)

## 2018-04-23 NOTE — MR AVS SNAPSHOT
After Visit Summary   4/23/2018    Belkys Mosley    MRN: [id number]           Patient Information     Date Of Birth          1990        Visit Information        Provider Department      4/23/2018 2:00 PM Dasha Peñaloza PA-C [facility] [place] [facility]      Today's Diagnoses     Bariatric surgery status    -  1    Postprocedural hematoma of abdominal wall          Care Instructions    Wound was re-dressed was 4x4 gauze. Instructions given on wound dressing.  Pt was instructured to massage area in shower if needed daily. If bright red blood starts to occur or the pace quickens.  Pt to call or return to clinic.  Pt to follow up in 1 month for and to band assessment.           Follow-ups after your visit        Your next 10 appointments already scheduled     May 14, 2018  9:00 AM CDT   Post Op with Beronica Benites 1, RD   [facility] [place] [facility] ([facility])    [address] [place] [id number] [phone]            May 21, 2018  9:00 AM CDT   PROCEDURE with Dasha Peñaloza PA-C   [facility] [place] [facility] ([facility])    [address] [place] [id number] [phone]              Who to contact     If you have questions or need follow up information about today's clinic visit or your schedule please contact [place] [facility] directly at [phone].  Normal or non-critical lab and imaging results will be communicated to you by MyChart, letter or phone within 4 business days after the clinic has received the results. If you do not hear from us within 7 days, please contact the clinic through WiLinxhart or phone. If you have a critical or abnormal lab result, we will notify you by phone as soon as possible.  Submit refill requests through WiLinxhart or call your  "pharmacy and they will forward the refill request to us. Please allow 3 business days for your refill to be completed.          Additional Information About Your Visit        MyChart Information     [email or web address] lets you send messages to your doctor, view your test results, renew your prescriptions, schedule appointments and more. To sign up, go to [email or web address] . Click on \"Log in\" on the left side of the screen, which will take you to the Welcome page. Then click on \"Sign up Now\" on the right side of the page.     You will be asked to enter the access code listed below, as well as some personal information. Please follow the directions to create your username and password.     Your access code is: [email or web address]  Expires: 2018  8:29 AM     Your access code will  in 90 days. If you need help or a new code, please call your [place] clinic or [phone].        Care EveryWhere ID     This is your Care EveryWhere ID. This could be used by other organizations to access your [place] medical records  [id number]        Your Vitals Were     Pulse Temperature Respirations Last Period Pulse Oximetry Breastfeeding?    84 97.7  F (36.5  C) 14 2018 100% No    BMI (Body Mass Index)                   25.26 kg/m2            Blood Pressure from Last 3 Encounters:   18 115/71   18 (!) 132/91   18 126/57    Weight from Last 3 Encounters:   18 142 lb 9.6 oz (64.7 kg)   18 146 lb 4.8 oz (66.4 kg)   03/15/18 148 lb 14.4 oz (67.5 kg)              Today, you had the following     No orders found for display       Primary Care Provider Office Phone # Fax #    Renayaugusta Mares [phone]       [facility] [address] [facility] [address]        Equal Access to Services     ANTIONETTE SONG : Cornelia Marrero, marguerite luqgurwinder, qafatuma kaalnelly crawley. [facility] [phone].    ATENCIÓN: Si habla " español, tiene a stewart disposición servicios gratuitos de asistencia lingüística. Daniel chaudhari [phone].    We comply with applicable federal civil rights laws and [place] laws. We do not discriminate on the basis of race, color, national origin, age, disability, sex, sexual orientation, or gender identity.            Thank you!     Thank you for choosing [place] [facility]  for your care. Our goal is always to provide you with excellent care. Hearing back from our patients is one way we can continue to improve our services. Please take a few minutes to complete the written survey that you may receive in the mail after your visit with us. Thank you!             Your Updated Medication List - Protect others around you: Learn how to safely use, store and throw away your medicines at www.disposemymeds.org.          This list is accurate as of 4/23/18  4:44 PM.  Always use your most recent med list.                   Brand Name Dispense Instructions for use Diagnosis    hydrocortisone 2.5 % cream      Apply topically daily as needed for other (Rosecea to ears)        levonorgestrel-ethinyl estradiol 0.15-30 MG-MCG per tablet    NORDETTE     Take 1 tablet by mouth daily        multivitamin  peds with iron 60 MG chewable tablet      Take 2 chew tab by mouth every morning        ranitidine 150 MG tablet    ZANTAC    60 tablet    Take 1 tablet (150 mg) by mouth 2 times daily    Gastric band slippage       VITAMIN D (CHOLECALCIFEROL) PO      Take 1,000 Units by mouth daily        VITRON-C PO      Take 1 tablet by mouth every morning

## 2018-04-23 NOTE — PATIENT INSTRUCTIONS
Wound was re-dressed was 4x4 gauze. Instructions given on wound dressing.  Pt was instructured to massage area in shower if needed daily. If bright red blood starts to occur or the pace quickens.  Pt to call or return to clinic.  Pt to follow up in 1 month for and to band assessment.

## 2018-04-24 ENCOUNTER — TELEPHONE (OUTPATIENT)
Dept: SURGERY | Facility: CLINIC | Age: 28
End: 2018-04-24

## 2018-04-24 VITALS
RESPIRATION RATE: 14 BRPM | DIASTOLIC BLOOD PRESSURE: 71 MMHG | WEIGHT: 142.6 LBS | BODY MASS INDEX: 25.26 KG/M2 | SYSTOLIC BLOOD PRESSURE: 115 MMHG | OXYGEN SATURATION: 100 % | TEMPERATURE: 97.7 F | HEART RATE: 84 BPM

## 2018-04-24 NOTE — TELEPHONE ENCOUNTER
LM for patient -   She was seen yesterday in clinic and just doing a follow up call.  If doing okay, no need to hear from her, otherwise always happy to talk.  Anyi Santiago, MS, RD, RN

## 2018-04-24 NOTE — PROGRESS NOTES
[facility]  2 Week Surgical Follow-Up     Current PCP:  Renay Mares  HISTORY OF PRESENT ILLNESS:  Belkys Mosley returns today for her follow-up appointment 2 weeks status post band removal and replacement. She comes in 2 days early because she has had oozing from her old port site on the right side of her abdomen.  She is accompanied by her Mother, Melanie.   Her fluid intake consists of Water and Premier Protein clear protein drink.  Feels well emotionally Yes.   Patient reports using: Alcohol - No     Cigarettes - No  Pain:    She is currently using nothing for pain relief. She hasn't used any Roxicodone in 4 days and no Tylenol either. She rates her pain at a 3 on the pain scale. The pain is located right side where incision is oozing blood.    Activity:  The patient is considered a fall risk: No.   What are you doing for physical activity? Walking her dog, walking on the treadmill and inside the house. She also worked one shift this weekend.  Patient plans to use treadmill and do various forms of physical activity to keep active.    Review of Systems:   GI:  Nausea or vomiting - none    Last BM was this am - it was NL color and form.  No dysphagia or GERD      Neuro/CV/Pulmonary:   Light headed or dizziness - none  SOB or Chest pain - none  LE Edema - negative   Vascular:  Antwon's Negative  :  Form of birth control is BCP  Symptoms of UTI:  negative  Endo: not diabetic  PHYSICAL EXAMINATION:    VITALS:  /71 (BP Location: Left arm, Patient Position: Sitting, Cuff Size: Adult Regular)  Pulse 84  Temp 97.7  F (36.5  C)  Resp 14  Wt 142 lb 9.6 oz (64.7 kg)  LMP 04/13/2018  SpO2 100%  Breastfeeding? No  BMI 25.26 kg/m2                    LUNGS:  clear to auscultation  ABDOMEN: Abdomen soft, non-tender. BS normal. No masses, organomegaly  INCISION: dry and intact except for the old port site on right side was oozing blood. No signs/sx of infection.  This incision was assessed and  cared for by CRISTIANO Lou - please refer to her note from today for further details.    MEDICATIONS/VITAMINS/ALLERGIES REVIEWED: Yes      ASSESSMENT:    1.    status post laparoscopic band surgery removal and replacement.    Steri strip removed?  Yes       PLAN:    Make follow up appointment to meet with psychologist and 1 month post operatively.     Follow up with your primary care provider to obesity related conditions by one month post op. Patient stated PCP recommended follow up with next yearly.    Advance diet per Dietitian today.     Start all recommended postoperative vitamins within in the first 6 weeks per Dietitian.    Call with questions or concerns at any time. To call if bright red blood is oozing from incision or the pace of oozing quickens.    Return to clinic in 4 weeks for nutrition visit.     Return to clinic for 6 week band adjustment and then  at postop month 3.    Guidelines provided re: how to increase activity/exercise?  Yes

## 2018-05-14 ENCOUNTER — OFFICE VISIT (OUTPATIENT)
Dept: SURGERY | Facility: CLINIC | Age: 28
End: 2018-05-14
Payer: COMMERCIAL

## 2018-05-14 VITALS — HEIGHT: 63 IN | BODY MASS INDEX: 26.59 KG/M2 | WEIGHT: 150.1 LBS

## 2018-05-14 DIAGNOSIS — Z98.84 BARIATRIC SURGERY STATUS: ICD-10-CM

## 2018-05-14 PROCEDURE — 97803 MED NUTRITION INDIV SUBSEQ: CPT | Performed by: DIETITIAN, REGISTERED

## 2018-05-14 NOTE — MR AVS SNAPSHOT
"                  MRN:[id number]                      After Visit Summary   2018    Belkys Mosley    MRN: [id number]           Visit Information        Provider Department      2018 9:00 AM 1, Beronica Tejada Diet, RD [facility]      Your next 10 appointments already scheduled     May 21, 2018  9:00 AM CDT   PROCEDURE with Dasha Peñaloza PA-C   [facility] ([facility])    [address] [place] [id number]   [phone]            2018  9:00 AM CDT   Return Visit with Dasha Peñaloza PA-C   [facility] ([facility])    [address] [place] [id number]   [phone]            2018  9:30 AM CDT   Return Visit with Beronica Benites 1, JOSE   [facility] ([facility])    [address] [place] [address]   [phone]              MyChart Information     "[employer]" lets you send messages to your doctor, view your test results, renew your prescriptions, schedule appointments and more. To sign up, go to [email or web address] . Click on \"Log in\" on the left side of the screen, which will take you to the Welcome page. Then click on \"Sign up Now\" on the right side of the page.     You will be asked to enter the access code listed below, as well as some personal information. Please follow the directions to create your username and password.     Your access code is: [email or web address]  Expires: 2018  8:29 AM     Your access code will  in 90 days. If you need help or a new code, please call your [facility] clinic or [phone].        Care EveryWhere ID     This is your Care EveryWhere ID. This could be used by other organizations to access your [facility] " records  [id number]        Equal Access to Services     ANTIONETTE SONG : Cornelia Marrero, marguerite dunbar, mirian kilpatrick, nelly gates. So [facility] [phone].    ATENCIÓN: Si habla español, tiene a stewart disposición servicios gratuitos de asistencia lingüística. Llame al [phone].    We comply with applicable federal civil rights laws and [place] laws. We do not discriminate on the basis of race, color, national origin, age, disability, sex, sexual orientation, or gender identity.

## 2018-05-14 NOTE — PROGRESS NOTES
"BARIATRIC PROGRESS NOTE - 4 Week Post Op  DATE OF VISIT: May 14, 2018    Belkys Mosley  1990  female  [id number]  28 year old    ASSESSMENT:    REASON FOR VISIT:  Belkys Mosley is a 28 year old year old female presents today for a 4 Week Post Op nutrition follow-up appointment. Patient is accompanied by self      DIAGNOSIS:  Status: post laparoscopic band surgery.  Obesity:  Overweight BMI 25-29.9    ANTHROPOMETRICS:  Height: 160 cm (5' 3\")  Initial weight: 108 kg (238 lb)  Current Weight: 68.1 kg (150 lb 1.6 oz)  BMI: 26.64 kg/(m^2).    VITAMINS AND MINERALS:   1 Multivitamin with Minerals  600 mg Calcium With Vitamin D BID  4000 International units Vitamin D  1 Vitron C (note low Hgb in April)    NUTRITION HISTORY:  Tolerating diet: Bariatric pureed diet  Fluids/water intake: 60 ounces/day  Fluid intake: water,enhanced water, protein drink, milk  Small bites: Yes  Portion size: 1/2-1 cup  20-30 minute meals: Yes  Fluids and meals  by 30 minutes: Yes  Chew foods thoroughly: Yes  Breakfast: 1 scrambled egg or Greek yogurt  Lunch: 1/2 cup pureed ground meat or pulled chicken or turkey, 1/4 cup mashed cauliflower  Dinner: 1/2 cup pureed meat or refried beans, 1/4 cup cooked carrots  Snacks: protein drink or [employer] milk  Nausea: none  Vomiting: none  Additional Information: patient was planning to restart [place] with caffeine, but after our discussion about caffeine she is willing to stay off of caffeine; has tried pulled chicken and did fine with this texture; pt says she feels better being able to eat more foods; currently working 2 jobs      PHYSICAL ACTIVITY:  Type: walking  Frequency: 3 days a week.  Duration: 45-60 minutes.    DIAGNOSIS:   Previous Nutrition Diagnosis: Altered gastrointestinal function related to alternation in gastrointestinal structure as evidenced by history of laparoscopic band.   No change    Previous Goals:  Start puree diet at day 14 post op.-met  Continue MVI, vitamin D, " and Vitron C and start calcium with vitamin D.-met  Aim for 60 to 90 grams protein.-met  Continue 48 to 64 oz of fluid per day.-met    Current Nutrition Diagnosis: Altered gastrointestinal function related to alternation in gastrointestinal structure as evidenced by history of laparoscopic band.     INTERVENTION  Nutrition Prescription: Recommended bariatric soft diet.    Goals:  Start soft diet at day 28 post op.  Continue MVI, calcium with vitamin D, vitamin D, and  iron with vitamin C.  Aim for 60 to 90 grams protein.  Continue 48 to 64 oz of fluid per day.    Implementation:  Discussed transition to soft diet.  Emphasized importance of adequate protein.  Reviewed required vitamins and mineral supplements.  Verbalizes fair-good understanding of surgery diet guidelines.  Assessed learning needs and learning preferences.      NUTRITION MONITORING AND EVALUATION:   Monitor diet tolerance and weight loss.      Anticipated Compliance: fair-good  Follow Up: Continue to monitor patient closely regarding weight loss and diet.  At 3 months Post Op    TIME SPENT WITH PATIENT: 25 minutes.    Leonardo Barrera RD, LD  [facility]  [phone]

## 2018-05-22 ENCOUNTER — OFFICE VISIT (OUTPATIENT)
Dept: SURGERY | Facility: CLINIC | Age: 28
End: 2018-05-22
Payer: COMMERCIAL

## 2018-05-22 VITALS
SYSTOLIC BLOOD PRESSURE: 107 MMHG | WEIGHT: 155.6 LBS | RESPIRATION RATE: 16 BRPM | HEIGHT: 63 IN | BODY MASS INDEX: 27.57 KG/M2 | HEART RATE: 80 BPM | DIASTOLIC BLOOD PRESSURE: 64 MMHG

## 2018-05-22 DIAGNOSIS — Z98.84 BARIATRIC SURGERY STATUS: ICD-10-CM

## 2018-05-22 DIAGNOSIS — Z46.51 FITTING AND ADJUSTMENT OF GASTRIC LAP BAND: ICD-10-CM

## 2018-05-22 PROCEDURE — 99207 ZZC NO CHARGE LOS: CPT | Performed by: PHYSICIAN ASSISTANT

## 2018-05-22 PROCEDURE — S2083 ADJUSTMENT GASTRIC BAND: HCPCS | Performed by: PHYSICIAN ASSISTANT

## 2018-05-22 NOTE — MR AVS SNAPSHOT
After Visit Summary   5/22/2018    Belkys Mosley    MRN: [id number]           Patient Information     Date Of Birth          1990        Visit Information        Provider Department      5/22/2018 10:30 AM Krystal Zimmer PA-C [facility]      Today's Diagnoses     BMI 27.0-27.9,adult    -  1    Bariatric surgery status        Fitting and adjustment of gastric lap band          Care Instructions    Return 3-4 weeks if another adjustment is needed.          Follow-ups after your visit        Your next 10 appointments already scheduled     Jul 30, 2018  9:00 AM CDT   Return Visit with Dasha Peñaloza PA-C   [facility] ([facility])    [address] [place] [id number] [phone]            Jul 30, 2018  9:30 AM CDT   Return Visit with Beronica Tejada Diet 1, RD   [facility] ([facility])    [address] [place] [id number] [phone]              Who to contact     If you have questions or need follow up information about today's clinic visit or your schedule please contact [facility] directly at [phone].  Normal or non-critical lab and imaging results will be communicated to you by MyChart, letter or phone within 4 business days after the clinic has received the results. If you do not hear from us within 7 days, please contact the clinic through MyChart or phone. If you have a critical or abnormal lab result, we will notify you by phone as soon as possible.  Submit refill requests through [employer] or call your pharmacy and they will forward the refill request to us. Please allow 3 business days for your refill to be completed.          Additional Information About Your Visit        MyChart Information      "[employer] lets you send messages to your doctor, view your test results, renew your prescriptions, schedule appointments and more. To sign up, go to [email or web address] . Click on \"Log in\" on the left side of the screen, which will take you to the Welcome page. Then click on \"Sign up Now\" on the right side of the page.     You will be asked to enter the access code listed below, as well as some personal information. Please follow the directions to create your username and password.     Your access code is: [email or web address]  Expires: 2018 10:19 AM     Your access code will  in 90 days. If you need help or a new code, please call your [facility] clinic or [phone].        Care EveryWhere ID     This is your [facility] EveryWhere ID. This could be used by other organizations to access your [facility] medical records  [id number]        Your Vitals Were     Pulse Respirations Height BMI (Body Mass Index)          80 16 5' 3\" (1.6 m) 27.56 kg/m2         Blood Pressure from Last 3 Encounters:   18 107/64   18 115/71   18 115/71    Weight from Last 3 Encounters:   18 155 lb 9.6 oz (70.6 kg)   18 150 lb 1.6 oz (68.1 kg)   18 142 lb 9.6 oz (64.7 kg)              We Performed the Following     Lap Band Adjustment - Clinic        Primary Care Provider Office Phone # Fax #    Renay MCGEE Vishnu [phone]       [facility] [address] [facility] [address]        Equal Access to Services     YASMANY SONG : Hadii gabriela Marrero, makda vanesaadaha, qaybta kaales kilpatrick, nelly lee . So [facility] [phone].    ATENCIÓN: Si habla español, tiene a stewart disposición servicios gratuitos de asistencia lingüística. Llame al [phone].    We comply with applicable federal civil rights laws and [place] laws. We do not discriminate on the basis of race, color, national origin, age, disability, sex, sexual orientation, or " gender identity.            Thank you!     Thank you for choosing [facility]  for your care. Our goal is always to provide you with excellent care. Hearing back from our patients is one way we can continue to improve our services. Please take a few minutes to complete the written survey that you may receive in the mail after your visit with us. Thank you!             Your Updated Medication List - Protect others around you: Learn how to safely use, store and throw away your medicines at www.disposemymeds.org.          This list is accurate as of 5/22/18 11:10 AM.  Always use your most recent med list.                   Brand Name Dispense Instructions for use Diagnosis    CALCIUM CITRATE + PO      Take 600 mg by mouth 2 times daily        hydrocortisone 2.5 % cream      Apply topically daily as needed for other (Rosecea to ears)        levonorgestrel-ethinyl estradiol 0.15-30 MG-MCG per tablet    NORDETTE     Take 1 tablet by mouth daily        multivitamin  peds with iron 60 MG chewable tablet      Take 2 chew tab by mouth every morning        ranitidine 150 MG tablet    ZANTAC    60 tablet    Take 1 tablet (150 mg) by mouth 2 times daily    Gastric band slippage       VITAMIN D (CHOLECALCIFEROL) PO      Take 1,000 Units by mouth daily        VITRON-C PO      Take 1 tablet by mouth every morning

## 2018-05-22 NOTE — PROGRESS NOTES
BAND ASSESSMENT VISIT  May 22, 2018    VITALS:          Weight: 155 lb 9.6 oz (70.6 kg)         BMI (Calculated): 27.62                   Cumulative weight loss (lbs): 82.4    SUBJECTIVE:  Patient comes to the clinic today for band assessment.  She had her lap band replaced on 4/13/2018.  She is wanting her first fill since the surgery. In regards to the patient's band, the patient feels they need fluid added to their band. She is not satisfied with her weight.  She is exercising 3x weekly or more.      BAND ROS:  Hungry between meals:    Yes  Eating between meals:    Yes  Eat >1 cup of food at meals:    No  Not losing 1-2 lbs a week:    Yes  Not feeling sense of restriction:   Yes    Have pain when swallowing:    No  Have heartburn, vomiting or reflux:   No  Have night cough or hiccups:   No  Making poor food choices:    No  Unable to eat chicken, steak and bread: No    Is pregnant      No  Will be traveling to remote areas   No  Will have surgery soon.   No    ASSESSMENT:    1.  S/P adjustable band surgery  2.  Malnutrition following GI Surgery    PLAN: After evaluation, we have elected to adjust her gastric band. Risk, benefits, and alternatives were reviewed before a consent was signed. Pt wishes to proceed. Pt will follow up in 3-4 weeks. for subsequent assessment.    PROCEDURE:  Adjustment of gastric band     PROCEDURE DETAILS: In the clinic exam room, the patient was placed in supine position on the exam table. The area over the access port was prepped with an alcohol swab, gloves were donned, and a Al needle and syringe were directed into the port under palpation guidance. An attempt to withdraw a  small amount of saline to verify location was unsuccessful. Patient was under impression the surgeon did not have any fluid in band. Then 2 mls was delivered into the port. Access needle was withdrawn and reinserted to verify the 2 mls were in the band. The needle was then withdrawn and a band aid was applied.  Patient sat up and drank 6 ounces of lukewarm water without difficulty. Pt should return to a liquid diet and advance as tolerated. Tight band warning signs were reviewed.  Pt left home in a stable and ambulatory condition.   She was informed of tight band symptoms and if necessary can return to clinic tomorrow to have fluid removed at no additional cost.

## 2018-06-02 ENCOUNTER — HEALTH MAINTENANCE LETTER (OUTPATIENT)
Age: 28
End: 2018-06-02

## 2018-06-12 ENCOUNTER — OFFICE VISIT (OUTPATIENT)
Dept: SURGERY | Facility: CLINIC | Age: 28
End: 2018-06-12
Payer: COMMERCIAL

## 2018-06-12 VITALS
HEART RATE: 76 BPM | WEIGHT: 161.4 LBS | DIASTOLIC BLOOD PRESSURE: 65 MMHG | SYSTOLIC BLOOD PRESSURE: 117 MMHG | BODY MASS INDEX: 28.6 KG/M2 | HEIGHT: 63 IN

## 2018-06-12 DIAGNOSIS — Z46.51 FITTING AND ADJUSTMENT OF GASTRIC LAP BAND: ICD-10-CM

## 2018-06-12 DIAGNOSIS — Z98.84 BARIATRIC SURGERY STATUS: Primary | ICD-10-CM

## 2018-06-12 PROCEDURE — 99024 POSTOP FOLLOW-UP VISIT: CPT | Performed by: PHYSICIAN ASSISTANT

## 2018-06-12 NOTE — PROGRESS NOTES
BARIATRIC FOLLOW UP BAND VISIT     June 12, 2018       HISTORY OF PRESENT ILLNESS: Pt returns today for her follow-up appointment status post adjustable gastric band surgery.  Overall she is doing well.  Would like a fill to her band today.  She has noticed very little resistance with her last fill.  Has had mild heartburn with spicy foods only.  She has dealt with this most of her life.  It is unchanged      Initial Weight: 238 lb (108 kg)   Current Weight: 161 lb 6.4 oz (73.2 kg)  Cumulative weight loss (lbs): 76.6  Last Visits Weight: 155 lb 9.6 oz (70.6 kg)     Patient is taking the following bariatric postoperative vitamins:  1 Complete multivitamins with minerals (at different times than calcium)   2000 International Units of Vitamin D daily  600 mg of Calcium daily   1000 mcg of Vitamin B12 once weekly  1 Iron/Vit C. daily    Pt is exercising by working out 3 times weekly.  Has been roller blading and running    In regards to the patient's band, the patient feels fluid needs to be added to the band.       OBESITY RELATED CONDITIONS:  Heartburn       SOCIAL HISTORY:  Pt denies smoking.  Pt infrequent alcohol use.  Took 2 ibuprofen last week for headache.  Takes very infrequently      REVIEW OF SYSTEMS:     GI:  Nausea- No  Vomiting- No  Diarrhea- No  Constipation- No  Dysphagia- No  Abdominal Pain- No  Heartburn- mild usually late evening.  Has noticed it with taco sauce only.  Continues taking zantac 150 mg twice daily     SKIN:  Intertriginous irritation- No       PSYCH:  Depression- N  Anxiety-No    BAND ROS:  Hungry between meals:    Yes  Eating between meals:    Yes  Eat >1 cup of food at meals:    Yes  Not losing 1-2 lbs a week:    Yes  Not feeling sense of restriction:   Yes    Have pain when swallowing:    No  Have heartburn, vomiting or reflux:   Yes - only after spicy foods  Have night cough or hiccups:   No  Making poor food choices:    No  Unable to eat chicken, steak and bread: No    Is pregnant  "     No  Will be traveling to remote areas   No  Will have surgery soon.   No      LABS/IMAGING/MEDICAL RECORDS REVIEW: labs from 4/2018      PHYSICAL EXAMINATION:   /65  Pulse 76  Ht 5' 3\" (1.6 m)  Wt 161 lb 6.4 oz (73.2 kg)  BMI 28.59 kg/m2    GENERAL: Alert and oriented x3. NAD  HEART: No murmurs, rubs or gallops, Regular rate and rhythm  LUNGS: Breathing unlabored, Lung sounds clear to auscultation bilaterally  ABDOMEN: soft; nontender; nondistended, incision well healed. No hernia  EXTREMITIES: No LE edema bilaterally, Gait normal  SKIN: No intertriginous irritation or rash      ASSESSMENT AND PLAN:      7 years status post adjustable gastric band. - Revision 4/12/2018  Morbid Obesity - resolved   Body mass index is 28.59 kg/(m^2)..  Post surgical malabsorption:   Ordered CBC, vitamin D, PTH, ferritin, TIBC, and iron labs.   Follow food plan per dietitian recommendations.   Continue taking recommended post-op vitamins.  Return to clinic in prn. Let patient know if she is too tight and needs fluid removed she must be seen tomorrow and will not be charged.  If she needs more fluid added should wait 4-6 weeks.      After evaluation, we have elected to adjust the gastric band. Risk, benefits, and alternatives were reviewed before a consent was signed. Pt wishes to proceed.    The dietitian did step in for a few minutes.  There was a concern that patient was still on a soft food diet.  But it turns out that it is a typical diet consisting of items such as shredded chicken.  After this discussion she elected to have band adjusted.      PROCEDURE: Adjustment of gastric band       PROCEDURE DETAILS: In the clinic exam room, the patient was placed in supine position on the exam table. The area over the access port was prepped with an alcohol swab, gloves were donned, and a Al needle and syringe were directed into the port under palpation guidance. A small amount of saline was aspirated to verify location, " and 1.5 mls was delivered into the port. Leaving a total of 3.5 mls in band.  Access needle was withdrawn and bandaid was applied. Patient sat up and drank 4 ounces of lukewarm water without difficulty. Pt should return to a liquid diet and advance as tolerated. Tight band warning signs were reviewed.  Pt left home in a stable and ambulatory condition.   I spent a total of 20 minutes face to face with Belkys during today's office visit. Over 50% of this time was spent counseling the patient and/or coordinating care.  NO CHARGE FOR VISIT AS IT IS WITHIN THE GLOBAL PERIOD

## 2018-06-12 NOTE — LETTER
[facility]          [address]  [address]  [place] [address]                                         Tel:  [phone]  Fax: [phone]    2019    Belkys Mosley  [address] [facility] [address]    : 1990      Dear Belkys;     We have identified that you have outstanding lab tests that have . If you would like to have the  labs completed, please contact our clinic at [phone] to have them re-ordered. If you have any questions, please contact our office.  Sincerely,    Cindi Alvarez CMA

## 2018-06-12 NOTE — LETTER
[facility]          [address]  [address]  [place] [address]                                         Tel:  [phone]  Fax: [phone]    2018    Belkys Mosley  [address] [facility] [address]    : 1990      Dear Belkys;     We have identified that you have outstanding lab tests that have . If you would like to have the  labs completed, please contact our clinic at [phone] to have them re-ordered. If you have any questions, please contact our office.  Sincerely,          SIOBHAN Puente

## 2018-06-12 NOTE — MR AVS SNAPSHOT
After Visit Summary   6/12/2018    Belkys Mosley    MRN: [id number]           Patient Information     Date Of Birth          1990        Visit Information        Provider Department      6/12/2018 10:00 AM Krystal Zimmer PA-C [facility]      Today's Diagnoses     Bariatric surgery status    -  1    BMI 28.0-28.9,adult        Fitting and adjustment of gastric lap band           Follow-ups after your visit        Your next 10 appointments already scheduled     Jul 30, 2018  9:00 AM CDT   Return Visit with Dasha Peñaloza PA-C   [facility] ([facility])    [address]  [place] [place] [address] [phone]            Jul 30, 2018  9:30 AM CDT   Return Visit with  Wl Diet 1, RD   [facility] ([facility])    [address]  [place] [place] [address] [phone]              Future tests that were ordered for you today     Open Future Orders        Priority Expected Expires Ordered    CBC with platelets Routine  12/9/2018 6/12/2018    Vitamin D Screen Routine  12/9/2018 6/12/2018    IRON Routine  12/9/2018 6/12/2018    IRON AND IRON BINDING CAPACITY Routine  12/9/2018 6/12/2018    FERRITIN Routine  12/9/2018 6/12/2018    Parathyroid Hormone Intact Routine  6/12/2019 6/12/2018            Who to contact     If you have questions or need follow up information about today's clinic visit or your schedule please contact [place] [facility] directly at [phone].  Normal or non-critical lab and imaging results will be communicated to you by MyChart, letter or phone within 4 business days after the clinic has received the results. If you do not hear from us within 7 days, please contact the clinic through MyChart or phone. If you  "have a critical or abnormal lab result, we will notify you by phone as soon as possible.  Submit refill requests through [employer] or call your pharmacy and they will forward the refill request to us. Please allow 3 business days for your refill to be completed.          Additional Information About Your Visit        Hooptaphart Information     [employer] gives you secure access to your electronic health record. If you see a primary care provider, you can also send messages to your care team and make appointments. If you have questions, please call your primary care clinic.  If you do not have a primary care provider, please call [phone] and they will assist you.        Care EveryWhere ID     This is your Care EveryWhere ID. This could be used by other organizations to access your [place] medical records  [id number]        Your Vitals Were     Pulse Height BMI (Body Mass Index)             76 5' 3\" (1.6 m) 28.59 kg/m2          Blood Pressure from Last 3 Encounters:   06/12/18 117/65   05/22/18 107/64   04/23/18 115/71    Weight from Last 3 Encounters:   06/12/18 161 lb 6.4 oz (73.2 kg)   05/22/18 155 lb 9.6 oz (70.6 kg)   05/14/18 150 lb 1.6 oz (68.1 kg)              We Performed the Following     Lap Band Adjustment - Clinic        Primary Care Provider Office Phone # Fax #    Renay Mares [phone]       [facility] [address] [place] [address]        Equal Access to Services     ANTIONETTE SONG : Hadii aad ku hadasho Soomaali, waaxda luqadaha, qaybta kaalmada adeegyada, nelly lee . So [facility] [phone].    ATENCIÓN: Si rajla wanda, tiene a stewart disposición servicios gratuitos de asistencia lingüística. Llame al [phone].    We comply with applicable federal civil rights laws and [place] laws. We do not discriminate on the basis of race, color, national origin, age, disability, sex, sexual orientation, or gender identity.          "   Thank you!     Thank you for choosing [place] [facility]  for your care. Our goal is always to provide you with excellent care. Hearing back from our patients is one way we can continue to improve our services. Please take a few minutes to complete the written survey that you may receive in the mail after your visit with us. Thank you!             Your Updated Medication List - Protect others around you: Learn how to safely use, store and throw away your medicines at www.disposemymeds.org.          This list is accurate as of 6/12/18 11:59 AM.  Always use your most recent med list.                   Brand Name Dispense Instructions for use Diagnosis    B-12 SL       Bariatric surgery status, BMI 28.0-28.9,adult       CALCIUM CITRATE + PO      Take 600 mg by mouth 2 times daily        hydrocortisone 2.5 % cream      Apply topically daily as needed for other (Rosecea to ears)        levonorgestrel-ethinyl estradiol 0.15-30 MG-MCG per tablet    NORDETTE     Take 1 tablet by mouth daily        multivitamin  peds with iron 60 MG chewable tablet      Take 2 chew tab by mouth every morning        ranitidine 150 MG tablet    ZANTAC    60 tablet    Take 1 tablet (150 mg) by mouth 2 times daily    Gastric band slippage       VITAMIN D (CHOLECALCIFEROL) PO      Take 1,000 Units by mouth daily        VITRON-C PO      Take 1 tablet by mouth every morning

## 2018-09-04 ENCOUNTER — OFFICE VISIT (OUTPATIENT)
Dept: SURGERY | Facility: CLINIC | Age: 28
End: 2018-09-04
Payer: COMMERCIAL

## 2018-09-04 VITALS — HEIGHT: 63 IN | WEIGHT: 166.9 LBS | BODY MASS INDEX: 29.57 KG/M2

## 2018-09-04 DIAGNOSIS — Z98.84 BARIATRIC SURGERY STATUS: ICD-10-CM

## 2018-09-04 DIAGNOSIS — E66.3 OVERWEIGHT (BMI 25.0-29.9): Primary | ICD-10-CM

## 2018-09-04 DIAGNOSIS — Z46.51 FITTING AND ADJUSTMENT OF GASTRIC LAP BAND: ICD-10-CM

## 2018-09-04 PROCEDURE — S2083 ADJUSTMENT GASTRIC BAND: HCPCS | Performed by: PHYSICIAN ASSISTANT

## 2018-09-04 NOTE — PROGRESS NOTES
BAND ASSESSMENT VISIT  September 4, 2018    VITALS:          Weight: 166 lb 14.4 oz (75.7 kg)         BMI (Calculated): 29.63         Wt change since last visit (lbs): 5.5         Cumulative weight loss (lbs): 71.1    SUBJECTIVE:  Patient comes to the clinic today for band assessment. She had her band replaced on 4/17/2018.  Is still getting fluid added to get back to the green zone.  In regards to the patient's band, the patient feels they need fluid added to their band. She is satisfied with her weight.  Is exercising 3x a week.     BAND ROS:  Hungry between meals:    Yes  Eating between meals:    Yes  Eat >1 cup of food at meals:    Yes  Not losing 1-2 lbs a week:    Yes  Not feeling sense of restriction:   Yes    Have pain when swallowing:    No  Have heartburn, vomiting or reflux:   No  Have night cough or hiccups:   No  Making poor food choices:    No  Unable to eat chicken, steak and bread: No    Is pregnant      No  Will be traveling to remote areas   No  Will have surgery soon.   No    ASSESSMENT:    1.  S/P adjustable band surgery  2.  Malnutrition following GI Surgery  3. Overweight    PLAN: After evaluation, we have elected to adjust her gastric band. Risk, benefits, and alternatives were reviewed before a consent was signed. Pt wishes to proceed. Pt will follow up in 3-4 weeks. for subsequent assessment.    PROCEDURE:  Adjustment of gastric band     PROCEDURE DETAILS: In the clinic exam room, the patient was placed in supine position on the exam table. The area over the access port was prepped with an alcohol swab, gloves were donned, and a Al needle and syringe were directed into the port under palpation guidance. A small amount of saline was aspirated to verify location, and 1.5 mls was delivered into the port. Access needle was withdrawn and bandaid was applied. Patient sat up and drank 4 ounces of lukewarm water without difficulty. Pt should return to a liquid diet and advance as tolerated. Tight  band warning signs were reviewed.  Pt left home in a stable and ambulatory condition.

## 2018-09-04 NOTE — MR AVS SNAPSHOT
After Visit Summary   9/4/2018    Belkys Mosley    MRN: [id number]           Patient Information     Date Of Birth          1990        Visit Information        Provider Department      9/4/2018 8:30 AM Dasha Peñaloza PA-C [place] [facility] [facility]      Today's Diagnoses     Overweight (BMI 25.0-29.9)    -  1    Bariatric surgery status        Fitting and adjustment of gastric lap band           Follow-ups after your visit        Follow-up notes from your care team     Return in 4 weeks (on 10/2/2018) for RTC Band Assessment.      Who to contact     If you have questions or need follow up information about today's clinic visit or your schedule please contact [place] [facility] directly at [phone].  Normal or non-critical lab and imaging results will be communicated to you by Adviqohart, letter or phone within 4 business days after the clinic has received the results. If you do not hear from us within 7 days, please contact the clinic through [employer] or phone. If you have a critical or abnormal lab result, we will notify you by phone as soon as possible.  Submit refill requests through [employer] or call your pharmacy and they will forward the refill request to us. Please allow 3 business days for your refill to be completed.          Additional Information About Your Visit        MyChart Information     [employer] gives you secure access to your electronic health record. If you see a primary care provider, you can also send messages to your care team and make appointments. If you have questions, please call your primary care clinic.  If you do not have a primary care provider, please call [phone] and they will assist you.        Care EveryWhere ID     This is your Care EveryWhere ID. This could be used by other organizations to access your [place] medical records  [id number]        Your Vitals  "Were     Height BMI (Body Mass Index)                5' 3\" (1.6 m) 29.57 kg/m2           Blood Pressure from Last 3 Encounters:   06/12/18 117/65   05/22/18 107/64   04/23/18 115/71    Weight from Last 3 Encounters:   09/04/18 166 lb 14.4 oz (75.7 kg)   06/12/18 161 lb 6.4 oz (73.2 kg)   05/22/18 155 lb 9.6 oz (70.6 kg)              We Performed the Following     Lap Band Adjustment - Clinic        Primary Care Provider Office Phone # Fax #    Renay Mares [phone]       [facility] [address] [facility] [address]        Equal Access to Services     ANTIONETTE SONG : Hadii gabriela feltono Sotony, waaxda luqadaha, qaybta kaalmada adeegyada, nelly gates. So [facility] [phone].    ATENCIÓN: Si habla español, tiene a stewart disposición servicios gratuitos de asistencia lingüística. [facility] [phone].    We comply with applicable federal civil rights laws and [place] laws. We do not discriminate on the basis of race, color, national origin, age, disability, sex, sexual orientation, or gender identity.            Thank you!     Thank you for choosing [place] [facility]  for your care. Our goal is always to provide you with excellent care. Hearing back from our patients is one way we can continue to improve our services. Please take a few minutes to complete the written survey that you may receive in the mail after your visit with us. Thank you!             Your Updated Medication List - Protect others around you: Learn how to safely use, store and throw away your medicines at www.disposemymeds.org.          This list is accurate as of 9/4/18 11:51 AM.  Always use your most recent med list.                   Brand Name Dispense Instructions for use Diagnosis    B-12 SL       Bariatric surgery status, BMI 28.0-28.9,adult       CALCIUM CITRATE + PO      Take 600 mg by mouth 2 times daily        hydrocortisone 2.5 % cream      " Apply topically daily as needed for other (Rosecea to ears)        levonorgestrel-ethinyl estradiol 0.15-30 MG-MCG per tablet    NORDETTE     Take 1 tablet by mouth daily        multivitamin  peds with iron 60 MG chewable tablet      Take 2 chew tab by mouth every morning        ranitidine 150 MG tablet    ZANTAC    60 tablet    Take 1 tablet (150 mg) by mouth 2 times daily    Gastric band slippage       VITAMIN D (CHOLECALCIFEROL) PO      Take 1,000 Units by mouth daily        VITRON-C PO      Take 1 tablet by mouth every morning

## 2018-10-08 ENCOUNTER — OFFICE VISIT (OUTPATIENT)
Dept: SURGERY | Facility: CLINIC | Age: 28
End: 2018-10-08
Payer: COMMERCIAL

## 2018-10-08 VITALS — BODY MASS INDEX: 31.2 KG/M2 | HEIGHT: 63 IN | WEIGHT: 176.1 LBS

## 2018-10-08 DIAGNOSIS — Z98.84 BARIATRIC SURGERY STATUS: ICD-10-CM

## 2018-10-08 DIAGNOSIS — Z46.51 FITTING AND ADJUSTMENT OF GASTRIC LAP BAND: ICD-10-CM

## 2018-10-08 PROCEDURE — S2083 ADJUSTMENT GASTRIC BAND: HCPCS | Performed by: PHYSICIAN ASSISTANT

## 2018-10-08 PROCEDURE — 99207 ZZC DROP WITH A PROCEDURE: CPT | Mod: 25 | Performed by: PHYSICIAN ASSISTANT

## 2018-10-08 NOTE — PATIENT INSTRUCTIONS
Band Post-Adjustment Instructions    After an adjustment, it is very important to change your diet to full liquids for 2-3 days afterwards.  If this consistency goes down well, advance your diet as tolerated back to solids over the next couple of days.    If you are in the red zone.  Please call [phone] and come back to the clinic right away so we can remove some fluid.    Green Zone:  Not hungry  Losing 1-2 lbs a week  Portion Control  Patient satisfaction  Red Zone:  reflux  vomiting  pain when eating, night cough  inability to eat solids  poor weight loss  Yellow Zone:  hungry between meals  not losing weight  eating larger portions

## 2018-10-08 NOTE — MR AVS SNAPSHOT
After Visit Summary   10/8/2018    Belkys Mosley    MRN: [id number]           Patient Information     Date Of Birth          1990        Visit Information        Provider Department      10/8/2018 9:30 AM Dasha Peñaloza PA-C [facility]      Today's Diagnoses     Bariatric surgery status        Fitting and adjustment of gastric lap band          Care Instructions    Band Post-Adjustment Instructions    After an adjustment, it is very important to change your diet to full liquids for 2-3 days afterwards.  If this consistency goes down well, advance your diet as tolerated back to solids over the next couple of days.    If you are in the red zone.  Please call [phone] and come back to the clinic right away so we can remove some fluid.    Green Zone:  Not hungry  Losing 1-2 lbs a week  Portion Control  Patient satisfaction  Red Zone:  reflux  vomiting  pain when eating, night cough  inability to eat solids  poor weight loss  Yellow Zone:  hungry between meals  not losing weight  eating larger portions            Follow-ups after your visit        Follow-up notes from your care team     Return in 4 weeks (on 11/5/2018) for RTC Band Assessment.      Who to contact     If you have questions or need follow up information about today's clinic visit or your schedule please contact [place] [facility] directly at [phone].  Normal or non-critical lab and imaging results will be communicated to you by MyChart, letter or phone within 4 business days after the clinic has received the results. If you do not hear from us within 7 days, please contact the clinic through MyChart or phone. If you have a critical or abnormal lab result, we will notify you by phone as soon as possible.  Submit refill requests through [employer] or call your pharmacy and they will forward the refill request to us.  "Please allow 3 business days for your refill to be completed.          Additional Information About Your Visit        MyChart Information     [employer] gives you secure access to your electronic health record. If you see a primary care provider, you can also send messages to your care team and make appointments. If you have questions, please call your primary care clinic.  If you do not have a primary care provider, please call [phone] and they will assist you.        Care EveryWhere ID     This is your Care EveryWhere ID. This could be used by other organizations to access your [place] medical records  [id number]        Your Vitals Were     Height BMI (Body Mass Index)                5' 3\" (1.6 m) 31.19 kg/m2           Blood Pressure from Last 3 Encounters:   06/12/18 117/65   05/22/18 107/64   04/23/18 115/71    Weight from Last 3 Encounters:   10/08/18 176 lb 1.6 oz (79.9 kg)   09/04/18 166 lb 14.4 oz (75.7 kg)   06/12/18 161 lb 6.4 oz (73.2 kg)              We Performed the Following     Lap Band Adjustment - Clinic        Primary Care Provider Office Phone # Fax #    Renay Stanleywalt [phone]       [facility] [address] [place] [address]        Equal Access to Services     ANTIONETTE SONG AH: Hadii gabriela ku hadasho Soomaali, waaxda luqadaha, qaybta kaalmada adeegyada, waxay idiin haysavin lorraine gates. So [facility] [phone].    ATENCIÓN: Si habla español, tiene a stewart disposición servicios gratuitos de asistencia lingüística. [facility] [phone].    We comply with applicable federal civil rights laws and [place] laws. We do not discriminate on the basis of race, color, national origin, age, disability, sex, sexual orientation, or gender identity.            Thank you!     Thank you for choosing [place] [facility]  for your care. Our goal is always to provide you with excellent care. Hearing back from our patients is one way we can " continue to improve our services. Please take a few minutes to complete the written survey that you may receive in the mail after your visit with us. Thank you!             Your Updated Medication List - Protect others around you: Learn how to safely use, store and throw away your medicines at www.disposemymeds.org.          This list is accurate as of 10/8/18  9:57 AM.  Always use your most recent med list.                   Brand Name Dispense Instructions for use Diagnosis    B-12 SL       Bariatric surgery status, BMI 28.0-28.9,adult       CALCIUM CITRATE + PO      Take 600 mg by mouth 2 times daily        hydrocortisone 2.5 % cream      Apply topically daily as needed for other (Rosecea to ears)        levonorgestrel-ethinyl estradiol 0.15-30 MG-MCG per tablet    NORDETTE     Take 1 tablet by mouth daily        multivitamin  peds with iron 60 MG chewable tablet      Take 2 chew tab by mouth every morning        ranitidine 150 MG tablet    ZANTAC    60 tablet    Take 1 tablet (150 mg) by mouth 2 times daily    Gastric band slippage       VITAMIN D (CHOLECALCIFEROL) PO      Take 1,000 Units by mouth daily        VITRON-C PO      Take 1 tablet by mouth every morning

## 2018-10-08 NOTE — PROGRESS NOTES
BAND ASSESSMENT VISIT    October 8, 2018    VITALS:          Weight: 176 lb 1.6 oz (79.9 kg)         BMI (Calculated): 31.26         Wt change since last visit (lbs): 10.1         Cumulative weight loss (lbs): 61.9    SUBJECTIVE:  Patient comes to the clinic today for band assessment.  In regards to the patient's band, the patient feels they need fluid added to their band. She is satisfied with her weight.  She is not exercising 3x weekly or more.  Looking at getting a gym membership.  She just stopped her  job so she is less active but starts a new one tonight.     BAND ROS:  Hungry between meals:    Yes  Eating between meals:    Yes, trying not, portion control harder because she has been traveling  Eat >1 cup of food at meals:    Yes  Not losing 1-2 lbs a week:    Yes  Not feeling sense of restriction:   Yes most of the day she doesn't have any.  Sometimes has some restriction in am.  Gets hungry between lunch and dinner and then eats over 1 cup at dinner.     Have pain when swallowing:    No  Have heartburn, vomiting or reflux:   No  Have night cough or hiccups:   No  Making poor food choices:    No  Unable to eat chicken, steak and bread: No    Is pregnant      No  Will be traveling to remote areas   No  Will have surgery soon.   No    ASSESSMENT:    1.  S/P adjustable band surgery  2.  Malnutrition following GI Surgery    PLAN: After evaluation, we have elected to adjust her gastric band. Risk, benefits, and alternatives were reviewed before a consent was signed. Pt wishes to proceed. Pt will follow up in 3-4 weeks. for subsequent assessment.    PROCEDURE:  Adjustment of gastric band     PROCEDURE DETAILS: In the clinic exam room, the patient was placed in supine position on the exam table. The area over the access port was prepped with an alcohol swab, gloves were donned, and a Al needle and syringe were directed into the port under palpation guidance. A small amount of saline was aspirated to  verify location, and 1 mls was delivered into the port. Access needle was withdrawn and bandaid was applied. Patient sat up and drank 4 ounces of lukewarm water without difficulty. Pt should return to a liquid diet and advance as tolerated. Tight band warning signs were reviewed.  Pt left home in a stable and ambulatory condition.

## 2019-12-09 ENCOUNTER — HEALTH MAINTENANCE LETTER (OUTPATIENT)
Age: 29
End: 2019-12-09

## 2020-03-15 ENCOUNTER — HEALTH MAINTENANCE LETTER (OUTPATIENT)
Age: 30
End: 2020-03-15

## 2020-07-16 ENCOUNTER — HOSPITAL ENCOUNTER (EMERGENCY)
Facility: CLINIC | Age: 30
Discharge: HOME | End: 2020-07-16
Attending: EMERGENCY MEDICINE | Admitting: EMERGENCY MEDICINE
Payer: COMMERCIAL

## 2020-07-16 VITALS
HEART RATE: 100 BPM | RESPIRATION RATE: 20 BRPM | DIASTOLIC BLOOD PRESSURE: 109 MMHG | SYSTOLIC BLOOD PRESSURE: 165 MMHG | TEMPERATURE: 99 F

## 2020-07-16 DIAGNOSIS — S61.111A LACERATION OF RIGHT THUMB WITHOUT FOREIGN BODY WITH DAMAGE TO NAIL, INITIAL ENCOUNTER: ICD-10-CM

## 2020-07-16 DIAGNOSIS — S61.319A LACERATION OF NAIL BED OF FINGER, INITIAL ENCOUNTER: ICD-10-CM

## 2020-07-16 PROCEDURE — 99283 EMERGENCY DEPT VISIT LOW MDM: CPT | Mod: 25

## 2020-07-16 PROCEDURE — 13132 CMPLX RPR F/C/C/M/N/AX/G/H/F: CPT

## 2020-07-16 RX ORDER — BUPIVACAINE HYDROCHLORIDE 5 MG/ML
INJECTION, SOLUTION PERINEURAL
Status: DISCONTINUED
Start: 2020-07-16 | End: 2020-07-16 | Stop reason: HOSPADM

## 2020-07-16 ASSESSMENT — ENCOUNTER SYMPTOMS: WOUND: 1

## 2020-07-16 NOTE — ED PROVIDER NOTES
History     Chief Complaint:    Laceration      HPI   Belkys Mosley is a 30 year old female who presents with a right thumb laceration. The patient was using a mandolin to cut potatoes and cut the tip of her right thumb. No syncope or nausea noted.    Allergies:    No Known Allergies     Medications:      Calcium Citrate-Vitamin D (CALCIUM CITRATE + PO)  Cyanocobalamin (B-12 SL)  hydrocortisone 2.5 % cream  Iron-Vitamin C (VITRON-C PO)  levonorgestrel-ethinyl estradiol (NORDETTE) 0.15-30 MG-MCG per tablet  multivitamin  peds with iron (FLINTSTONES COMPLETE) 60 MG chewable tablet  ranitidine (ZANTAC) 150 MG tablet  VITAMIN D, CHOLECALCIFEROL, PO      Past Medical History:      Past Medical History:   Diagnosis Date     Gastroesophageal reflux disease        Past Surgical History:      Past Surgical History:   Procedure Laterality Date     ANKLE SURGERY  [id number]    Right     BARIATRIC SURGERY       GI SURGERY      Laparoscopic adjustable gastric banding 2015     LAPAROSCOPIC REMOVAL GASTRIC ADJUSTABLE BAND N/A 4/13/2018    Procedure: LAPAROSCOPIC REMOVAL GASTRIC ADJUSTABLE BAND;  LAPAROSCOPIC REMOVAL AND REPLACEMENT GASTRIC BAND DEVICE AND PORT ;  Surgeon: Larry Shearer MD;  Location: [facility] OR     ORTHOPEDIC SURGERY      Right ankle surgery 2005       Family History:      Family History   Problem Relation Age of Onset     Breast Cancer Paternal Grandfather        Social History:    Marital Status:  Single [1]  Social History     Tobacco Use     Smoking status: Never Smoker     Smokeless tobacco: Never Used   Substance Use Topics     Alcohol use: Yes     Comment: Socially     Drug use: No        Review of Systems   Skin: Positive for wound (right thumb).   All other systems reviewed and are negative.      Physical Exam   First Vitals:  BP: (!) 165/109  Pulse: 100  Temp: 99  F (37.2  C)  Resp: 20    Patient Vitals for the past 24 hrs:   BP Temp Pulse Resp   07/16/20 1833 (!) 165/109 99  F (37.2  C) 100 20      Physical Exam    HEENT:  mmm. Normal phonation.  Eyes: PERRL B/L  CV: Peripheral pulses in tact and regular  Resp: Speaking in full sentences without any respiratory distress  Musculoskeletal:  Right Hand    No TTP over distal radius or ulna  She can oppose thumb.  No sub-ungual hematoma noted at present  This is a closed injury  No anatomical snuff box tenderness  She is able to fire Hand/finger flexors and extensors.  There is normal strength against resistance  Sensation and perfusion intact throughout hand    Remainder of the skeletal survey is unremarkable    Skin: Warm, dry, well perfused. 4 cm laceration to the tip of the right thumb. This was almost a fingertip amputation with a nailbed injury.  Neuro: Alert, no gross motor or sensory deficits  Psych: Calm      Emergency Department Course         Laceration Repair        LACERATION:  A subcutaneous and complex minimally Contaminated 4 cm laceration.      LOCATION:  Right thumb (near distal thumb amputation)      FUNCTION:  Distally sensation, circulation, motor and tendon function are intact.      ANESTHESIA:  Digital block using Marcaine 0.5% total of 4 mLs      PREPARATION:  Irrigation and Scrubbing with Normal Saline and Sea Cleans      DEBRIDEMENT:  wound explored, no foreign body found      CLOSURE:  Wound was closed with One Layer.  Skin closed with 7 x 5.0 Ethylon using interrupted sutures. Nailbed was exposed and closed with 2x 5.0 Fast Gut sutures. The nail was anchored back down with 1x 5.0 fast gut suture. Part of the laceration close to, but just outside of the fingernail were closed with 2x 5.0 fast gut suture. There were a total of 12 sutures. 7x 5.0 Ethylon and 5x 5.0 fast gut.    Interventions:  Medications - No data to display      Emergency Department Course:    ED Course as of Jul 16 2357   u Jul 16, 2020   1842 Dr. Cox' evaluation      1847 Digital block with Marcaine 0.5%, 4 mL      1939 Rechecked. Patient feels numb. Laceration  repaired. Thumb nail partially removed and nailbed laceration repaired. Nail sutured back in place.          Impression & Plan      Medical Decision Making:  This 30-year-old female patient presents the ED due to a thumb laceration.  Please see the HPI and exam for specifics.  The patient had almost a complete distal fingertip amputation.  I was able to repair the laceration as above.  Part of the nail was removed and the nailbed laceration was repaired and the nail was sutured back in place.  Patient tolerated this well and was discharged in stable condition to follow-up in the outpatient setting.  Wound care information given to patient and significant other.  Anticipatory guidance given prior to discharge.      Diagnosis:    ICD-10-CM    1. Laceration of nail bed of finger, initial encounter  S61.319A    2. Laceration of right thumb without foreign body with damage to nail, initial encounter  S61.111A        Disposition:  discharged to home    Discharge Medications:  Discharge Medication List as of 7/16/2020  9:16 PM          Larry Cox DO  7/16/2020   [facility] EMERGENCY DEPARTMENT       Larry Cox,   07/16/20 0568

## 2020-07-16 NOTE — ED AVS SNAPSHOT
[facility]  [address]  [facility] [address]  Phone:  [phone]  Fax:  [phone]                                    Belkys Mosley   MRN: [id number]    Department:  [facility] Emergency Department   Date of Visit:  7/16/2020           After Visit Summary Signature Page    I have received my discharge instructions, and my questions have been answered. I have discussed any challenges I see with this plan with the nurse or doctor.    ..........................................................................................................................................  Patient/Patient Representative Signature      ..........................................................................................................................................  Patient Representative Print Name and Relationship to Patient    ..................................................               ................................................  Date                                   Time    ..........................................................................................................................................  Reviewed by Signature/Title    ...................................................              ..............................................  Date                                               Time          22EPIC Rev 08/18

## 2020-07-17 NOTE — DISCHARGE INSTRUCTIONS
Diagnosis: Right fingertip laceration, right nailbed laceration.  What do you do next: Keep the wound clean and dry.  You may wash your hands with soap.  Do not use alcohol, Betadine, or hydrogen peroxide.  Do not soak your hand anything and do not swim in lakes, public pools, or soak in hot tubs until your sutures have been removed.  Please follow with your primary care clinic in about 1 week for suture removal.  When do you return: If you have fevers, intractable pain of your thumb, spreading redness or foul-smelling drainage, or any other symptoms that concern you please return to the emergency department for reevaluation.    Thank you for allowing us to care for you today.

## 2021-01-14 ENCOUNTER — HEALTH MAINTENANCE LETTER (OUTPATIENT)
Age: 31
End: 2021-01-14

## 2021-10-24 ENCOUNTER — HEALTH MAINTENANCE LETTER (OUTPATIENT)
Age: 31
End: 2021-10-24

## 2022-01-07 LAB
ABO (EXTERNAL): NORMAL
HEPATITIS B SURFACE ANTIGEN (EXTERNAL): NEGATIVE
HIV1+2 AB SERPL QL IA: NEGATIVE
RH (EXTERNAL): POSITIVE
RUBELLA ANTIBODY IGG (EXTERNAL): NORMAL
TREPONEMA PALLIDUM ANTIBODY (EXTERNAL): NEGATIVE

## 2022-02-13 ENCOUNTER — HEALTH MAINTENANCE LETTER (OUTPATIENT)
Age: 32
End: 2022-02-13

## 2022-07-13 LAB — GROUP B STREPTOCOCCUS (EXTERNAL): POSITIVE

## 2022-08-05 ENCOUNTER — ANESTHESIA EVENT (OUTPATIENT)
Dept: OBGYN | Facility: CLINIC | Age: 32
End: 2022-08-05
Payer: COMMERCIAL

## 2022-08-05 ENCOUNTER — HOSPITAL ENCOUNTER (INPATIENT)
Facility: CLINIC | Age: 32
LOS: 2 days | Discharge: HOME | End: 2022-08-07
Attending: OBSTETRICS & GYNECOLOGY | Admitting: OBSTETRICS & GYNECOLOGY
Payer: COMMERCIAL

## 2022-08-05 ENCOUNTER — ANESTHESIA (OUTPATIENT)
Dept: OBGYN | Facility: CLINIC | Age: 32
End: 2022-08-05
Payer: COMMERCIAL

## 2022-08-05 DIAGNOSIS — Z34.90 ENCOUNTER FOR INDUCTION OF LABOR: Primary | ICD-10-CM

## 2022-08-05 LAB
ABO/RH(D): NORMAL
ANTIBODY SCREEN: NEGATIVE
ERYTHROCYTE [DISTWIDTH] IN BLOOD BY AUTOMATED COUNT: 12.8 % (ref 10–15)
HCT VFR BLD AUTO: 37.8 % (ref 35–47)
HGB BLD-MCNC: 12.5 G/DL (ref 11.7–15.7)
MCH RBC QN AUTO: 29.3 PG (ref 26.5–33)
MCHC RBC AUTO-ENTMCNC: 33.1 G/DL (ref 31.5–36.5)
MCV RBC AUTO: 89 FL (ref 78–100)
PLATELET # BLD AUTO: 229 10E3/UL (ref 150–450)
RBC # BLD AUTO: 4.27 10E6/UL (ref 3.8–5.2)
SARS-COV-2 RNA RESP QL NAA+PROBE: NEGATIVE
SPECIMEN EXPIRATION DATE: NORMAL
T PALLIDUM AB SER QL: NONREACTIVE
WBC # BLD AUTO: 17.5 10E3/UL (ref 4–11)

## 2022-08-05 PROCEDURE — 86901 BLOOD TYPING SEROLOGIC RH(D): CPT | Performed by: OBSTETRICS & GYNECOLOGY

## 2022-08-05 PROCEDURE — 3E0R3BZ INTRODUCTION OF ANESTHETIC AGENT INTO SPINAL CANAL, PERCUTANEOUS APPROACH: ICD-10-PCS | Performed by: ANESTHESIOLOGY

## 2022-08-05 PROCEDURE — 250N000011 HC RX IP 250 OP 636: Performed by: OBSTETRICS & GYNECOLOGY

## 2022-08-05 PROCEDURE — 250N000011 HC RX IP 250 OP 636: Performed by: ANESTHESIOLOGY

## 2022-08-05 PROCEDURE — 85027 COMPLETE CBC AUTOMATED: CPT | Performed by: OBSTETRICS & GYNECOLOGY

## 2022-08-05 PROCEDURE — 250N000009 HC RX 250: Performed by: OBSTETRICS & GYNECOLOGY

## 2022-08-05 PROCEDURE — 258N000003 HC RX IP 258 OP 636: Performed by: OBSTETRICS & GYNECOLOGY

## 2022-08-05 PROCEDURE — 3E033VJ INTRODUCTION OF OTHER HORMONE INTO PERIPHERAL VEIN, PERCUTANEOUS APPROACH: ICD-10-PCS | Performed by: OBSTETRICS & GYNECOLOGY

## 2022-08-05 PROCEDURE — 120N000001 HC R&B MED SURG/OB

## 2022-08-05 PROCEDURE — U0005 INFEC AGEN DETEC AMPLI PROBE: HCPCS | Performed by: OBSTETRICS & GYNECOLOGY

## 2022-08-05 PROCEDURE — 10907ZC DRAINAGE OF AMNIOTIC FLUID, THERAPEUTIC FROM PRODUCTS OF CONCEPTION, VIA NATURAL OR ARTIFICIAL OPENING: ICD-10-PCS | Performed by: OBSTETRICS & GYNECOLOGY

## 2022-08-05 PROCEDURE — 370N000003 HC ANESTHESIA WARD SERVICE

## 2022-08-05 PROCEDURE — 250N000013 HC RX MED GY IP 250 OP 250 PS 637: Performed by: OBSTETRICS & GYNECOLOGY

## 2022-08-05 PROCEDURE — 00HU33Z INSERTION OF INFUSION DEVICE INTO SPINAL CANAL, PERCUTANEOUS APPROACH: ICD-10-PCS | Performed by: ANESTHESIOLOGY

## 2022-08-05 PROCEDURE — 250N000011 HC RX IP 250 OP 636

## 2022-08-05 PROCEDURE — 250N000009 HC RX 250: Performed by: ANESTHESIOLOGY

## 2022-08-05 PROCEDURE — 86780 TREPONEMA PALLIDUM: CPT | Performed by: OBSTETRICS & GYNECOLOGY

## 2022-08-05 PROCEDURE — 0KQM0ZZ REPAIR PERINEUM MUSCLE, OPEN APPROACH: ICD-10-PCS | Performed by: OBSTETRICS & GYNECOLOGY

## 2022-08-05 PROCEDURE — 722N000001 HC LABOR CARE VAGINAL DELIVERY SINGLE

## 2022-08-05 PROCEDURE — 258N000003 HC RX IP 258 OP 636: Performed by: ANESTHESIOLOGY

## 2022-08-05 RX ORDER — ONDANSETRON 2 MG/ML
4 INJECTION INTRAMUSCULAR; INTRAVENOUS EVERY 6 HOURS PRN
Status: DISCONTINUED | OUTPATIENT
Start: 2022-08-05 | End: 2022-08-05 | Stop reason: HOSPADM

## 2022-08-05 RX ORDER — ONDANSETRON 4 MG/1
4 TABLET, ORALLY DISINTEGRATING ORAL EVERY 6 HOURS PRN
Status: DISCONTINUED | OUTPATIENT
Start: 2022-08-05 | End: 2022-08-05 | Stop reason: HOSPADM

## 2022-08-05 RX ORDER — PROCHLORPERAZINE MALEATE 10 MG
10 TABLET ORAL EVERY 6 HOURS PRN
Status: DISCONTINUED | OUTPATIENT
Start: 2022-08-05 | End: 2022-08-05 | Stop reason: HOSPADM

## 2022-08-05 RX ORDER — CITRIC ACID/SODIUM CITRATE 334-500MG
30 SOLUTION, ORAL ORAL
Status: DISCONTINUED | OUTPATIENT
Start: 2022-08-05 | End: 2022-08-05 | Stop reason: HOSPADM

## 2022-08-05 RX ORDER — OXYTOCIN/0.9 % SODIUM CHLORIDE 30/500 ML
340 PLASTIC BAG, INJECTION (ML) INTRAVENOUS CONTINUOUS PRN
Status: DISCONTINUED | OUTPATIENT
Start: 2022-08-05 | End: 2022-08-07 | Stop reason: HOSPADM

## 2022-08-05 RX ORDER — LIDOCAINE 40 MG/G
CREAM TOPICAL
Status: DISCONTINUED | OUTPATIENT
Start: 2022-08-05 | End: 2022-08-05 | Stop reason: HOSPADM

## 2022-08-05 RX ORDER — ACETAMINOPHEN 325 MG/1
650 TABLET ORAL EVERY 4 HOURS PRN
Status: DISCONTINUED | OUTPATIENT
Start: 2022-08-05 | End: 2022-08-07 | Stop reason: HOSPADM

## 2022-08-05 RX ORDER — IBUPROFEN 800 MG/1
800 TABLET, FILM COATED ORAL EVERY 6 HOURS PRN
Qty: 40 TABLET | Refills: 1 | Status: ON HOLD | OUTPATIENT
Start: 2022-08-05 | End: 2024-06-28

## 2022-08-05 RX ORDER — HYDROCORTISONE 25 MG/G
CREAM TOPICAL 3 TIMES DAILY PRN
Qty: 30 G | Refills: 0 | Status: ON HOLD | OUTPATIENT
Start: 2022-08-05 | End: 2024-06-28

## 2022-08-05 RX ORDER — TRANEXAMIC ACID 10 MG/ML
1 INJECTION, SOLUTION INTRAVENOUS EVERY 30 MIN PRN
Status: DISCONTINUED | OUTPATIENT
Start: 2022-08-05 | End: 2022-08-05 | Stop reason: HOSPADM

## 2022-08-05 RX ORDER — METOCLOPRAMIDE 10 MG/1
10 TABLET ORAL EVERY 6 HOURS PRN
Status: DISCONTINUED | OUTPATIENT
Start: 2022-08-05 | End: 2022-08-05 | Stop reason: HOSPADM

## 2022-08-05 RX ORDER — BISACODYL 10 MG
10 SUPPOSITORY, RECTAL RECTAL DAILY PRN
Status: DISCONTINUED | OUTPATIENT
Start: 2022-08-05 | End: 2022-08-07 | Stop reason: HOSPADM

## 2022-08-05 RX ORDER — MODIFIED LANOLIN
OINTMENT (GRAM) TOPICAL
Qty: 7 G | Refills: 3 | Status: ON HOLD | OUTPATIENT
Start: 2022-08-05 | End: 2024-06-28

## 2022-08-05 RX ORDER — MISOPROSTOL 200 UG/1
400 TABLET ORAL
Status: DISCONTINUED | OUTPATIENT
Start: 2022-08-05 | End: 2022-08-05 | Stop reason: HOSPADM

## 2022-08-05 RX ORDER — IBUPROFEN 800 MG/1
800 TABLET, FILM COATED ORAL EVERY 6 HOURS PRN
Status: DISCONTINUED | OUTPATIENT
Start: 2022-08-05 | End: 2022-08-07 | Stop reason: HOSPADM

## 2022-08-05 RX ORDER — CARBOPROST TROMETHAMINE 250 UG/ML
250 INJECTION, SOLUTION INTRAMUSCULAR
Status: DISCONTINUED | OUTPATIENT
Start: 2022-08-05 | End: 2022-08-07 | Stop reason: HOSPADM

## 2022-08-05 RX ORDER — OXYTOCIN/0.9 % SODIUM CHLORIDE 30/500 ML
1-24 PLASTIC BAG, INJECTION (ML) INTRAVENOUS CONTINUOUS
Status: DISCONTINUED | OUTPATIENT
Start: 2022-08-05 | End: 2022-08-05 | Stop reason: HOSPADM

## 2022-08-05 RX ORDER — METHYLERGONOVINE MALEATE 0.2 MG/ML
200 INJECTION INTRAVENOUS
Status: DISCONTINUED | OUTPATIENT
Start: 2022-08-05 | End: 2022-08-05 | Stop reason: HOSPADM

## 2022-08-05 RX ORDER — DOCUSATE SODIUM 100 MG/1
100 CAPSULE, LIQUID FILLED ORAL DAILY
Qty: 30 CAPSULE | Refills: 0 | Status: ON HOLD | OUTPATIENT
Start: 2022-08-05 | End: 2024-06-28

## 2022-08-05 RX ORDER — OXYTOCIN 10 [USP'U]/ML
10 INJECTION, SOLUTION INTRAMUSCULAR; INTRAVENOUS
Status: DISCONTINUED | OUTPATIENT
Start: 2022-08-05 | End: 2022-08-06

## 2022-08-05 RX ORDER — MISOPROSTOL 200 UG/1
800 TABLET ORAL
Status: DISCONTINUED | OUTPATIENT
Start: 2022-08-05 | End: 2022-08-05 | Stop reason: HOSPADM

## 2022-08-05 RX ORDER — BUPIVACAINE HYDROCHLORIDE 2.5 MG/ML
INJECTION, SOLUTION EPIDURAL; INFILTRATION; INTRACAUDAL PRN
Status: DISCONTINUED | OUTPATIENT
Start: 2022-08-05 | End: 2022-08-05

## 2022-08-05 RX ORDER — METOCLOPRAMIDE HYDROCHLORIDE 5 MG/ML
10 INJECTION INTRAMUSCULAR; INTRAVENOUS EVERY 6 HOURS PRN
Status: DISCONTINUED | OUTPATIENT
Start: 2022-08-05 | End: 2022-08-05 | Stop reason: HOSPADM

## 2022-08-05 RX ORDER — KETOROLAC TROMETHAMINE 30 MG/ML
30 INJECTION, SOLUTION INTRAMUSCULAR; INTRAVENOUS
Status: DISCONTINUED | OUTPATIENT
Start: 2022-08-05 | End: 2022-08-06

## 2022-08-05 RX ORDER — OXYTOCIN 10 [USP'U]/ML
10 INJECTION, SOLUTION INTRAMUSCULAR; INTRAVENOUS
Status: DISCONTINUED | OUTPATIENT
Start: 2022-08-05 | End: 2022-08-05 | Stop reason: HOSPADM

## 2022-08-05 RX ORDER — SODIUM CHLORIDE, SODIUM LACTATE, POTASSIUM CHLORIDE, CALCIUM CHLORIDE 600; 310; 30; 20 MG/100ML; MG/100ML; MG/100ML; MG/100ML
INJECTION, SOLUTION INTRAVENOUS CONTINUOUS PRN
Status: DISCONTINUED | OUTPATIENT
Start: 2022-08-05 | End: 2022-08-05 | Stop reason: HOSPADM

## 2022-08-05 RX ORDER — HYDROCORTISONE 25 MG/G
CREAM TOPICAL 3 TIMES DAILY PRN
Status: DISCONTINUED | OUTPATIENT
Start: 2022-08-05 | End: 2022-08-07 | Stop reason: HOSPADM

## 2022-08-05 RX ORDER — CARBOPROST TROMETHAMINE 250 UG/ML
250 INJECTION, SOLUTION INTRAMUSCULAR
Status: DISCONTINUED | OUTPATIENT
Start: 2022-08-05 | End: 2022-08-05 | Stop reason: HOSPADM

## 2022-08-05 RX ORDER — TRANEXAMIC ACID 10 MG/ML
1 INJECTION, SOLUTION INTRAVENOUS EVERY 30 MIN PRN
Status: DISCONTINUED | OUTPATIENT
Start: 2022-08-05 | End: 2022-08-07 | Stop reason: HOSPADM

## 2022-08-05 RX ORDER — FENTANYL/BUPIVACAINE/NS/PF 2-1250MCG
PLASTIC BAG, INJECTION (ML) INJECTION
Status: COMPLETED
Start: 2022-08-05 | End: 2022-08-05

## 2022-08-05 RX ORDER — MODIFIED LANOLIN
OINTMENT (GRAM) TOPICAL
Status: DISCONTINUED | OUTPATIENT
Start: 2022-08-05 | End: 2022-08-07 | Stop reason: HOSPADM

## 2022-08-05 RX ORDER — MISOPROSTOL 200 UG/1
400 TABLET ORAL
Status: DISCONTINUED | OUTPATIENT
Start: 2022-08-05 | End: 2022-08-07 | Stop reason: HOSPADM

## 2022-08-05 RX ORDER — FENTANYL CITRATE 50 UG/ML
100 INJECTION, SOLUTION INTRAMUSCULAR; INTRAVENOUS
Status: DISCONTINUED | OUTPATIENT
Start: 2022-08-05 | End: 2022-08-05 | Stop reason: HOSPADM

## 2022-08-05 RX ORDER — MISOPROSTOL 200 UG/1
800 TABLET ORAL
Status: DISCONTINUED | OUTPATIENT
Start: 2022-08-05 | End: 2022-08-07 | Stop reason: HOSPADM

## 2022-08-05 RX ORDER — OXYTOCIN/0.9 % SODIUM CHLORIDE 30/500 ML
340 PLASTIC BAG, INJECTION (ML) INTRAVENOUS CONTINUOUS PRN
Status: DISCONTINUED | OUTPATIENT
Start: 2022-08-05 | End: 2022-08-05 | Stop reason: HOSPADM

## 2022-08-05 RX ORDER — NALBUPHINE HYDROCHLORIDE 10 MG/ML
2.5-5 INJECTION, SOLUTION INTRAMUSCULAR; INTRAVENOUS; SUBCUTANEOUS EVERY 6 HOURS PRN
Status: DISCONTINUED | OUTPATIENT
Start: 2022-08-05 | End: 2022-08-06

## 2022-08-05 RX ORDER — OXYTOCIN 10 [USP'U]/ML
10 INJECTION, SOLUTION INTRAMUSCULAR; INTRAVENOUS
Status: DISCONTINUED | OUTPATIENT
Start: 2022-08-05 | End: 2022-08-07 | Stop reason: HOSPADM

## 2022-08-05 RX ORDER — DOCUSATE SODIUM 100 MG/1
100 CAPSULE, LIQUID FILLED ORAL DAILY
Status: DISCONTINUED | OUTPATIENT
Start: 2022-08-05 | End: 2022-08-07 | Stop reason: HOSPADM

## 2022-08-05 RX ORDER — NALOXONE HYDROCHLORIDE 0.4 MG/ML
0.4 INJECTION, SOLUTION INTRAMUSCULAR; INTRAVENOUS; SUBCUTANEOUS
Status: DISCONTINUED | OUTPATIENT
Start: 2022-08-05 | End: 2022-08-05 | Stop reason: HOSPADM

## 2022-08-05 RX ORDER — IBUPROFEN 800 MG/1
800 TABLET, FILM COATED ORAL
Status: DISCONTINUED | OUTPATIENT
Start: 2022-08-05 | End: 2022-08-06

## 2022-08-05 RX ORDER — OXYTOCIN/0.9 % SODIUM CHLORIDE 30/500 ML
100-340 PLASTIC BAG, INJECTION (ML) INTRAVENOUS CONTINUOUS PRN
Status: DISCONTINUED | OUTPATIENT
Start: 2022-08-05 | End: 2022-08-06

## 2022-08-05 RX ORDER — LIDOCAINE HYDROCHLORIDE 10 MG/ML
INJECTION, SOLUTION EPIDURAL; INFILTRATION; INTRACAUDAL; PERINEURAL PRN
Status: DISCONTINUED | OUTPATIENT
Start: 2022-08-05 | End: 2022-08-05

## 2022-08-05 RX ORDER — METHYLERGONOVINE MALEATE 0.2 MG/ML
200 INJECTION INTRAVENOUS
Status: DISCONTINUED | OUTPATIENT
Start: 2022-08-05 | End: 2022-08-07 | Stop reason: HOSPADM

## 2022-08-05 RX ORDER — PENICILLIN G 3000000 [IU]/50ML
3 INJECTION, SOLUTION INTRAVENOUS EVERY 4 HOURS
Status: DISCONTINUED | OUTPATIENT
Start: 2022-08-05 | End: 2022-08-05 | Stop reason: HOSPADM

## 2022-08-05 RX ORDER — NALOXONE HYDROCHLORIDE 0.4 MG/ML
0.2 INJECTION, SOLUTION INTRAMUSCULAR; INTRAVENOUS; SUBCUTANEOUS
Status: DISCONTINUED | OUTPATIENT
Start: 2022-08-05 | End: 2022-08-05 | Stop reason: HOSPADM

## 2022-08-05 RX ORDER — PROCHLORPERAZINE 25 MG
25 SUPPOSITORY, RECTAL RECTAL EVERY 12 HOURS PRN
Status: DISCONTINUED | OUTPATIENT
Start: 2022-08-05 | End: 2022-08-05 | Stop reason: HOSPADM

## 2022-08-05 RX ORDER — FENTANYL CITRATE-0.9 % NACL/PF 10 MCG/ML
100 PLASTIC BAG, INJECTION (ML) INTRAVENOUS EVERY 5 MIN PRN
Status: DISCONTINUED | OUTPATIENT
Start: 2022-08-05 | End: 2022-08-05 | Stop reason: HOSPADM

## 2022-08-05 RX ORDER — PENICILLIN G POTASSIUM 5000000 [IU]/1
5 INJECTION, POWDER, FOR SOLUTION INTRAMUSCULAR; INTRAVENOUS ONCE
Status: COMPLETED | OUTPATIENT
Start: 2022-08-05 | End: 2022-08-05

## 2022-08-05 RX ADMIN — PENICILLIN G POTASSIUM 5 MILLION UNITS: 5000000 POWDER, FOR SOLUTION INTRAMUSCULAR; INTRAPLEURAL; INTRATHECAL; INTRAVENOUS at 08:46

## 2022-08-05 RX ADMIN — BUPIVACAINE HYDROCHLORIDE 10 ML: 2.5 INJECTION, SOLUTION EPIDURAL; INFILTRATION; INTRACAUDAL at 13:26

## 2022-08-05 RX ADMIN — SODIUM CHLORIDE, POTASSIUM CHLORIDE, SODIUM LACTATE AND CALCIUM CHLORIDE: 600; 310; 30; 20 INJECTION, SOLUTION INTRAVENOUS at 08:20

## 2022-08-05 RX ADMIN — PENICILLIN G 3 MILLION UNITS: 3000000 INJECTION, SOLUTION INTRAVENOUS at 12:46

## 2022-08-05 RX ADMIN — Medication 2 MILLI-UNITS/MIN: at 11:19

## 2022-08-05 RX ADMIN — SODIUM CHLORIDE, POTASSIUM CHLORIDE, SODIUM LACTATE AND CALCIUM CHLORIDE: 600; 310; 30; 20 INJECTION, SOLUTION INTRAVENOUS at 15:14

## 2022-08-05 RX ADMIN — SODIUM CHLORIDE, POTASSIUM CHLORIDE, SODIUM LACTATE AND CALCIUM CHLORIDE 500 ML: 600; 310; 30; 20 INJECTION, SOLUTION INTRAVENOUS at 13:12

## 2022-08-05 RX ADMIN — Medication: at 13:30

## 2022-08-05 RX ADMIN — DOCUSATE SODIUM 100 MG: 100 CAPSULE, LIQUID FILLED ORAL at 19:44

## 2022-08-05 RX ADMIN — LIDOCAINE HYDROCHLORIDE 3 ML: 10 INJECTION, SOLUTION EPIDURAL; INFILTRATION; INTRACAUDAL; PERINEURAL at 13:20

## 2022-08-05 RX ADMIN — IBUPROFEN 800 MG: 800 TABLET, FILM COATED ORAL at 23:27

## 2022-08-05 ASSESSMENT — ACTIVITIES OF DAILY LIVING (ADL)
ADLS_ACUITY_SCORE: 18
ADLS_ACUITY_SCORE: 18
WEAR_GLASSES_OR_BLIND: NO
ADLS_ACUITY_SCORE: 18
DIFFICULTY_EATING/SWALLOWING: NO
WALKING_OR_CLIMBING_STAIRS_DIFFICULTY: NO
CHANGE_IN_FUNCTIONAL_STATUS_SINCE_ONSET_OF_CURRENT_ILLNESS/INJURY: NO
FALL_HISTORY_WITHIN_LAST_SIX_MONTHS: NO
ADLS_ACUITY_SCORE: 18
TOILETING_ISSUES: NO
DRESSING/BATHING_DIFFICULTY: NO
ADLS_ACUITY_SCORE: 19
CONCENTRATING,_REMEMBERING_OR_MAKING_DECISIONS_DIFFICULTY: NO
DOING_ERRANDS_INDEPENDENTLY_DIFFICULTY: NO
ADLS_ACUITY_SCORE: 31

## 2022-08-05 NOTE — ANESTHESIA PREPROCEDURE EVALUATION
Anesthesia Pre-Procedure Evaluation    Patient: Belkys Mosley   MRN: [id number] : 1990        Procedure :           Past Medical History:   Diagnosis Date     Gastroesophageal reflux disease       Past Surgical History:   Procedure Laterality Date     ANKLE SURGERY  [id number]    Right     BARIATRIC SURGERY       GI SURGERY      Laparoscopic adjustable gastric banding      LAPAROSCOPIC REMOVAL GASTRIC ADJUSTABLE BAND N/A 2018    Procedure: LAPAROSCOPIC REMOVAL GASTRIC ADJUSTABLE BAND;  LAPAROSCOPIC REMOVAL AND REPLACEMENT GASTRIC BAND DEVICE AND PORT ;  Surgeon: Larry Shearer MD;  Location: [facility] OR     ORTHOPEDIC SURGERY      Right ankle surgery       No Known Allergies   Social History     Tobacco Use     Smoking status: Never Smoker     Smokeless tobacco: Never Used   Substance Use Topics     Alcohol use: Yes     Comment: Socially      Wt Readings from Last 1 Encounters:   22 85.7 kg (189 lb)        Anesthesia Evaluation            ROS/MED HX  ENT/Pulmonary:  - neg pulmonary ROS     Neurologic:  - neg neurologic ROS     Cardiovascular:  - neg cardiovascular ROS     METS/Exercise Tolerance:     Hematologic:  - neg hematologic  ROS     Musculoskeletal:  - neg musculoskeletal ROS     GI/Hepatic: Comment: Gastric bypass    (+) GERD, Asymptomatic on medication,     Renal/Genitourinary:  - neg Renal ROS     Endo:  - neg endo ROS     Psychiatric/Substance Use:  - neg psychiatric ROS     Infectious Disease:  - neg infectious disease ROS     Malignancy:       Other:            Physical Exam    Airway        Mallampati: II   TM distance: > 3 FB   Neck ROM: full   Mouth opening: > 3 cm    Respiratory Devices and Support         Dental  no notable dental history         Cardiovascular          Rhythm and rate: regular and normal     Pulmonary   pulmonary exam normal                OUTSIDE LABS:  CBC:   Lab Results   Component Value Date    WBC 17.5 (H) 2022    HGB 12.5 2022     HGB 9.4 (L) 04/14/2018    HCT 37.8 08/05/2022     08/05/2022     04/13/2018     BMP:   Lab Results   Component Value Date     04/14/2018    POTASSIUM 3.3 (L) 04/14/2018    CHLORIDE 108 04/14/2018    CO2 24 04/14/2018    CR 0.63 04/14/2018    CR 0.83 04/13/2018     COAGS: No results found for: PTT, INR, FIBR  POC:   Lab Results   Component Value Date    BGM 77 04/14/2018    HCG Negative 04/13/2018     HEPATIC: No results found for: ALBUMIN, PROTTOTAL, ALT, AST, GGT, ALKPHOS, BILITOTAL, BILIDIRECT, PAVEL  OTHER: No results found for: PH, LACT, A1C, JOSE F, PHOS, MAG, LIPASE, AMYLASE, TSH, T4, T3, CRP, SED    Anesthesia Plan    ASA Status:  2   - Procedure: Procedure only, no anesthetic delivered      Anesthesia Type: Epidural.              Consents    Anesthesia Plan(s) and associated risks, benefits, and realistic alternatives discussed. Questions answered and patient/representative(s) expressed understanding.    - Discussed:     - Discussed with:  Patient         Postoperative Care            Comments:    Other Comments: Continuous Labor Epidural: Indication is for labor pain. Following an discussion of the procedure, epidural expectations, and risks and benefits (risks including, but not limited to, nerve damage, infection, bleeding/hematoma, inadvertent dural puncture, spinal headache, partial or failed block), the patient appears to understand and consents to proceed. Questions were encouraged and answered.             Jasper Mora MD

## 2022-08-05 NOTE — H&P
"  2022    Belkys Mosley  [id number]            OB Admit History & Physical      Ms. Mosley  is here for elective IOL.    She has noticed good fetal movement, no leaking/bleeding, no new swelling, headache, visual changes, RUQ pain, dysuria, flank pain.    No LMP recorded. Patient is pregnant.   Her Estimated Date of Delivery: Data Unavailable  , making her Unknown  wks.      Estimated body mass index is 31.19 kg/m  as calculated from the following:    Height as of 10/8/18: 1.6 m (5' 3\").    Weight as of 10/8/18: 79.9 kg (176 lb 1.6 oz).  Her prenatal course has been complicated by resolved placenta previa, hx gastric banding surgery.    See prenatal for labs.  positive GBBS, Rubella Immune, RH positive    Estimated fetal weight= 3300gm       She is a 32 year old   Her OB history:   OB History    Para Term  AB Living   1 0 0 0 0 0   SAB IAB Ectopic Multiple Live Births   0 0 0 0 0      # Outcome Date GA Lbr Scott/2nd Weight Sex Delivery Anes PTL Lv   1 Current                     Past Medical History:   Diagnosis Date     Gastroesophageal reflux disease           Past Surgical History:   Procedure Laterality Date     ANKLE SURGERY  [id number]    Right     BARIATRIC SURGERY       GI SURGERY      Laparoscopic adjustable gastric banding      LAPAROSCOPIC REMOVAL GASTRIC ADJUSTABLE BAND N/A 2018    Procedure: LAPAROSCOPIC REMOVAL GASTRIC ADJUSTABLE BAND;  LAPAROSCOPIC REMOVAL AND REPLACEMENT GASTRIC BAND DEVICE AND PORT ;  Surgeon: Larry Shearer MD;  Location: [facility] OR     ORTHOPEDIC SURGERY      Right ankle surgery          No current outpatient medications on file.       Allergies: Patient has no known allergies.      REVIEW OF SYSTEMS:  NEUROLOGIC:  Negative  EYES:  Negative  ENT:  Negative  GI:  Negative  BREAST:  Negative  :  Negative  GYN:  Negative  CV:  Negative  PULMONARY:  Negative  MUSCULOSKELETAL:  Negative  PSYCH:  Negative        Social History     Socioeconomic " History     Marital status: Single     Spouse name: Not on file     Number of children: Not on file     Years of education: Not on file     Highest education level: Not on file   Occupational History     Not on file   Tobacco Use     Smoking status: Never Smoker     Smokeless tobacco: Never Used   Substance and Sexual Activity     Alcohol use: Yes     Comment: Socially     Drug use: No     Sexual activity: Not on file   Other Topics Concern     Parent/sibling w/ CABG, MI or angioplasty before 65F 55M? Not Asked   Social History Narrative     Not on file     Social Determinants of Health     Financial Resource Strain: Not on file   Food Insecurity: Not on file   Transportation Needs: Not on file   Physical Activity: Not on file   Stress: Not on file   Social Connections: Not on file   Intimate Partner Violence: Not on file   Housing Stability: Not on file      Family History   Problem Relation Age of Onset     Breast Cancer Paternal Grandfather          Vitals:   FHT category 1 with rare ctx    Alert Awake in NAD  HEENT grossly normal  Neck: no lymphadenopathy or thryoidomegaly  Lungs CTB  Back no spinal or CVAT  Heart RRR  ABD gravid, nontender on exam with vtx palpable  Pelvic:  no fluid noted, no blood noted  Cervix is 4 cm / 80 % effaced at -2 station  EXT:  no edema or calf tenderness  Neuro:  Grossly intact    Assessment/Plan: 33 y/o  with SIUP 40w0 by 10w0 US, presenting for elective IOL    Prenatal Care:  - OB labs reviewed:   Blood type: A pos  Rubella: immune  HIV: negative  Hep B Ag: negative  Treponema: negative  - Genetics: NIPS and AFP normal  - Anatomy ultrasound: normal, low lying placenta  - 28 week labs prev ordered: GCT, PLT, HGB, Treponema  - Flu vaccine: completed  - COVID vaccine: fully vaccinated. (J & J and booster)  - Tdap: at next visit  - GBS: positive  - Contraception: condoms  - RTO 2-4 weeks    Elective induction of labor  -plan to treat with PCN for GBS prophylaxis   -pitocin IOL  after 2-4 hours of abx  -pain control as desired  -anticipate     Low-lying placenta, resolved   7-15mm from os  Resolved on , off restrictions    Gastric band  Nutrition is adequate  Watch weight gain carefully    Overweight (pre-pregnancy BMI =29)  Plan weight gain of 15-25 lb    [facility] @ [facility]  Has been in contact with [facility] program  Quit early December      Nai Felix MD  Dept of OB/GYN  2022

## 2022-08-05 NOTE — PROVIDER NOTIFICATION
08/05/22 1450   Provider Notification   Provider Name/Title Dr Felix   Method of Notification At Bedside   Dr Felix at bedside to evaluate progress after epidural. SVE and AROM at this time.FHR tracing reviewed at bedside

## 2022-08-05 NOTE — PROGRESS NOTES
"LABOR NOTE    Subjective: Reports comfort with epidural. AROM of clear fluid, with [place]. Pitocin 4mU/min.    Objective:  /68 (BP Location: Right arm, Patient Position: Semi-Gonzalez's, Cuff Size: Adult Regular)   Temp 98.5  F (36.9  C) (Oral)   Resp 16   Ht 1.6 m (5' 3\")   Wt 85.7 kg (189 lb)   LMP  (LMP Unknown)   BMI 33.48 kg/m     FHT: category 1  [place]: q 2-3 min  SVE: 9/100/0    33 y/o  with SIUP 40w0 by 10w0 US, here for elective IOL    Elective induction of labor  -plan to treat with PCN for GBS prophylaxis   -pitocin IOL after 2-4 hours of abx  -pain control as desired  -anticipate     Low-lying placenta, resolved   7-15mm from os  Resolved on , off restrictions    Gastric band  Nutrition is adequate  Watch weight gain carefully    Overweight (pre-pregnancy BMI =29)  Plan weight gain of 15-25 lb    [facility] @ [facility]  Has been in contact with [facility] program  Quit early December    Anticipate     Nai Felix MD on 2022 at 3:24 PM    "

## 2022-08-05 NOTE — DISCHARGE SUMMARY
[facility] Discharge Summary    Belkys Mosley MRN# [id number]   Age: 32 year old YOB: 1990     Date of Admission:  2022  Date of Discharge::  2022  Admitting Physician:  Nai Felix MD  Discharge Physician:  Nai Felix MD   Home clinic: [facility]          Admission Diagnoses:   Encounter for induction of labor [Z34.90]  Hx gastric band  Resolved placenta previa  [facility]C at initial intake, since discontinued            Discharge Diagnosis:   Normal spontaneous vaginal delivery  Intrauterine pregnancy at 40 weeks gestation          Procedures:   Procedure(s):  with repair of second degree laceration       No other procedures performed during this admission           Medications Prior to Admission:     Medications Prior to Admission   Medication Sig Dispense Refill Last Dose     hydrocortisone 2.5 % cream Apply topically daily as needed for other ([place] to ears)   Past Week at Unknown time             Discharge Medications:     Current Discharge Medication List      START taking these medications    Details   benzocaine (AMERICAINE) 20 % external aerosol Apply to perineum four times daily as needed for pain  Qty: 57 g, Refills: 0    Associated Diagnoses: Encounter for induction of labor      docusate sodium (COLACE) 100 MG capsule Take 1 capsule (100 mg) by mouth daily  Qty: 30 capsule, Refills: 0    Associated Diagnoses: Encounter for induction of labor      hydrocortisone, Perianal, (ANUSOL-HC) 2.5 % cream Place rectally 3 times daily as needed for hemorrhoids  Qty: 30 g, Refills: 0    Associated Diagnoses: Encounter for induction of labor      ibuprofen (ADVIL/MOTRIN) 800 MG tablet Take 1 tablet (800 mg) by mouth every 6 hours as needed for moderate pain (cramping)  Qty: 40 tablet, Refills: 1    Associated Diagnoses: Encounter for induction of labor      lanolin ointment Apply topically every hour as needed for other (sore nipples)  Qty:  7 g, Refills: 3    Associated Diagnoses: Encounter for induction of labor         CONTINUE these medications which have NOT CHANGED    Details   hydrocortisone 2.5 % cream Apply topically daily as needed for other ([place]ce[place] to ears)                   Consultations:   No consultations were requested during this admission          Brief History of Labor:   Belkys Mosley is a 32 year old  who was admitted at 0730 2022. She presented for elective induction, and was found to be elise mildly. She was noted to be 4 cm dilated. This pregnancy was complicated by hx gastric band, resolved previa, overweight, [facility]C at ob intake. GBS positive, Rh positive, Covid negative. She was admitted to Labor and Delivery. Pitocin induction was begun, as contractions were not adequate.  Labor progressed, and epidural was administered.  Rupture of membranes was artificial at 1500, and clear fluid was noted. She progressed to complete. She pushed effectively, for approximately 56 minutes. At 1710, she experienced  of a vigorous female , from an ANTOINETTE position, over a small second degree laceration. Repair was performed with 3.0 Vicryl.  APGARS 8/9. Pitocin was begun after delivery of the baby. The cord was cut at 120+ seconds, as it had ceased pulsing. A three vessel cord was noted. The placenta delivered spontaneously, and found to appear intact on inspection. QBL 105mL.           Hospital Course:   The patient's hospital course was unremarkable.  On discharge, her pain was well controlled. Vaginal bleeding is similar to peak menstrual flow.  Voiding without difficulty.  Ambulating well and tolerating a normal diet.  No fever.  Breastfeeding well.  Infant is stable.  No bowel movement yet.*  She was discharged on post-partum day #2.    Post-partum hemoglobin:   Hemoglobin   Date Value Ref Range Status   2022 12.5 11.7 - 15.7 g/dL Final   2018 9.4 (L) 11.7 - 15.7 g/dL Final             Discharge Instructions  and Follow-Up:   Discharge diet: Regular   Discharge activity: No driving or operating machinery while on narcotic analgesics  Pelvic rest: abstain from intercourse and do not use tampons for 6 week(s)   Discharge follow-up: Follow up with primary care provider in 6 weeks   Wound care: Drink plenty of fluids  Ice to area for comfort           Discharge Disposition:   Discharged to home        Nai Felix MD on 8/7/2022 at 12:36 PM

## 2022-08-05 NOTE — L&D DELIVERY NOTE
Belkys Mosley is a 32 year old  who was admitted at 0730 2022. She presented for elective induction, and was found to be elise mildly. She was noted to be 4 cm dilated. This pregnancy was complicated by hx gastric band, resolved previa, overweight, [facility]C at ob intake. GBS positive, Rh positive, Covid negative. She was admitted to Labor and Delivery. Pitocin induction was begun, as contractions were not adequate.  Labor progressed, and epidural was administered.  Rupture of membranes was artificial at 1500, and clear fluid was noted. She progressed to complete. She pushed effectively, for approximately 56 minutes. At 1710, she experienced  of a vigorous female , from an ANTOINETTE position, over a small second degree laceration. Repair was performed with 3.0 Vicryl.  APGARS 8/9. Pitocin was begun after delivery of the baby. The cord was cut at 120+ seconds, as it had ceased pulsing. A three vessel cord was noted. The placenta delivered spontaneously, and found to appear intact on inspection. QBL 105mL.    Nai Felix MD on 2022 at 5:38 PM

## 2022-08-05 NOTE — PROGRESS NOTES
"LABOR NOTE    Subjective: Reports increased discomfort with contractions. Pitocin increased to 4mU/min.    Objective:  /68 (BP Location: Right arm, Patient Position: Semi-Gonzalez's, Cuff Size: Adult Regular)   Temp 98.5  F (36.9  C) (Oral)   Resp 16   Ht 1.6 m (5' 3\")   Wt 85.7 kg (189 lb)   LMP  (LMP Unknown)   BMI 33.48 kg/m     FHT: category 1  [place]: q 4-6 min  SVE: //-1    31 y/o  with SIUP 40w0 by 10w0 US, here for elective IOL    Elective induction of labor  -plan to treat with PCN for GBS prophylaxis   -pitocin IOL after 2-4 hours of abx  -pain control as desired  -anticipate     Low-lying placenta, resolved   7-15mm from os  Resolved on , off restrictions    Gastric band  Nutrition is adequate  Watch weight gain carefully    Overweight (pre-pregnancy BMI =29)  Plan weight gain of 15-25 lb    [facility] @ [facility]  Has been in contact with [facility] program  Quit early December    Nai Felix MD on 2022 at 1:18 PM    "

## 2022-08-05 NOTE — PLAN OF CARE
Data: Patient admitted to room 407 at 0755. Patient is a . Prenatal record reviewed.   OB History    Para Term  AB Living   1 0 0 0 0 0   SAB IAB Ectopic Multiple Live Births   0 0 0 0 0      # Outcome Date GA Lbr Scott/2nd Weight Sex Delivery Anes PTL Lv   1 Current            .  Medical History:   Past Medical History:   Diagnosis Date     Gastroesophageal reflux disease    .  Gestational age 40+0. Vital signs per doc flowsheet. Fetal movement present. Patient reports Induction Of Labor   as reason for admission. Support person,  Merritt present. Orders for inpatient induction present and active  Action: Verbal consent for EFM, external fetal monitors applied. Admission assessment completed. Patient and support persons educated on labor process. Patient instructed to report change in fetal movement, contractions, vaginal leaking of fluid or bleeding, abdominal pain, or any concerns related to the pregnancy to her nurse/physician. Patient oriented to room, call light in reach.   Response: Dr. Felix at bedside now at 0900. Plan per provider is initiation of Abx treatment for GBS+ status, then IV Pitocin. Patient verbalized understanding of education and verbalized agreement with plan. Patient coping with labor via emotional support.

## 2022-08-05 NOTE — ANESTHESIA PROCEDURE NOTES
Epidural catheter Procedure Note    Pre-Procedure   Staff -        Anesthesiologist:  Jasper Mora MD       Performed By: anesthesiologist       Referred By: Elvira       Location: OB       Pre-Anesthestic Checklist: patient identified, IV checked, risks and benefits discussed, informed consent, monitors and equipment checked, pre-op evaluation and at physician/surgeon's request  Timeout:       Correct Patient: Yes        Correct Procedure: Yes        Correct Site: Yes        Correct Position: Yes   Procedure Documentation  Procedure: epidural catheter       Diagnosis: labor       Patient Position: sitting       Patient Prep/Sterile Barriers: sterile gloves, mask, patient draped       Skin prep: Betadine       Local skin infiltrated with 2 mL of 1% lidocaine.        Insertion Site: L3-4. (midline approach).       Technique: LORT saline        JESSY at 6 cm.       Needle Type: ToWelkin Healthy needle       Needle Gauge: 17.        Needle Length (Inches): 3.5        Catheter: 19 G.          Catheter threaded easily.             # of attempts: 1 and  # of redirects:  0    Assessment/Narrative         Paresthesias: No.       Test dose of 3 (1% LIDO) mL at.         Test dose negative, 3 minutes after injection, for signs of intravascular, subdural, or intrathecal injection.       Insertion/Infusion Method: LORT saline       Aspiration negative for Heme or CSF via Epidural Catheter.     Comments:  Procedure tolerated well without apparent complications. Initial MDA bolus of 0.25% bupivacaine was incrementally given without difficulty or complications. Epidural infusion (0.125% bupi with fentanyl 2mcg/ml) started at 10 ml/hr with PCEA of 5 ml q15min with a max cumulative dose of 25 ml/hr. PCEA instructions given and use encouraged PRN. Epidural expectations again reviewed and questions answered. Patient hemodynamically stable.  Patient and epidural functionality to be reassessed later via vital sign flowsheet, nursing  communication, and/or patient report.  Anesthesiologist immediately available for ongoing assessment and management.

## 2022-08-06 LAB
AMPHETAMINES UR QL SCN: NORMAL
BZE UR QL SCN: NORMAL
CANNABINOIDS UR QL SCN: NORMAL
OPIATES UR QL SCN: NORMAL
PCP QUAL URINE (ROCHE): NORMAL

## 2022-08-06 PROCEDURE — 120N000001 HC R&B MED SURG/OB

## 2022-08-06 PROCEDURE — 80307 DRUG TEST PRSMV CHEM ANLYZR: CPT | Performed by: OBSTETRICS & GYNECOLOGY

## 2022-08-06 PROCEDURE — 250N000013 HC RX MED GY IP 250 OP 250 PS 637: Performed by: OBSTETRICS & GYNECOLOGY

## 2022-08-06 RX ADMIN — BENZOCAINE: 11.4 AEROSOL, SPRAY TOPICAL at 20:29

## 2022-08-06 RX ADMIN — DOCUSATE SODIUM 100 MG: 100 CAPSULE, LIQUID FILLED ORAL at 08:44

## 2022-08-06 RX ADMIN — IBUPROFEN 800 MG: 800 TABLET, FILM COATED ORAL at 14:42

## 2022-08-06 RX ADMIN — IBUPROFEN 800 MG: 800 TABLET, FILM COATED ORAL at 20:29

## 2022-08-06 RX ADMIN — IBUPROFEN 800 MG: 800 TABLET, FILM COATED ORAL at 07:41

## 2022-08-06 RX ADMIN — BENZOCAINE: 11.4 AEROSOL, SPRAY TOPICAL at 08:44

## 2022-08-06 ASSESSMENT — ACTIVITIES OF DAILY LIVING (ADL)
ADLS_ACUITY_SCORE: 18
ADLS_ACUITY_SCORE: 19
ADLS_ACUITY_SCORE: 19
ADLS_ACUITY_SCORE: 18

## 2022-08-06 NOTE — ANESTHESIA POSTPROCEDURE EVALUATION
Patient: Belkys Mosley    Procedure: * No procedures listed *       Anesthesia Type:  Epidural    Note:  Disposition: Inpatient   Postop Pain Control: Uneventful            Sign Out: Well controlled pain   PONV: No   Neuro/Psych: Uneventful            Sign Out: Acceptable/Baseline neuro status   Airway/Respiratory: Uneventful            Sign Out: Acceptable/Baseline resp. status   CV/Hemodynamics: Uneventful            Sign Out: Acceptable CV status   Other NRE: NONE   DID A NON-ROUTINE EVENT OCCUR? No    Event details/Postop Comments:    Patient doing well.  Residual neuraxial block resolved with no reported numbness or paresthesias.  Ambulating, voiding, and eating without difficulty. Denies positional headache.  Pain well controlled. No bowel or bladder changes. Minimal back discomfort at epidural site. No apparent epidural complications.  Questions encouraged and answered.             Last vitals:  Vitals:    08/05/22 2047 08/06/22 0341 08/06/22 0741   BP: 132/78 115/62 120/65   Pulse: 70     Resp: 18 18 18   Temp: 98.9  F (37.2  C) 98.4  F (36.9  C) 98  F (36.7  C)   SpO2:          Electronically Signed By: Jasper Mora MD  August 6, 2022  8:38 AM

## 2022-08-06 NOTE — LACTATION NOTE
"This note was copied from a baby's chart.  Lactation visit. This is Belkys's first child (Rylee). She reports breastfeeding is going \"slow.\" Writer provided support and reviewed that infants are often sleepy during the first 24 hours. During the visit, Rylee was latched on the left breast with a nipple shield. A few swallows were heard. Writer reviewed basic breastfeeding education and expected  feeding behaviors. Writer discussed how to wean off of the nipple shield eventually. Plan for lactation follow up as needed.   "

## 2022-08-06 NOTE — PLAN OF CARE
Report received and care assumed. Pt oriented to room, plan of care, infant safety and /postpartum teaching initiated. Call light in reach. VSS and pt meeting expected goals for the shift. Denies pain meds. Working on breastfeeding with nipple shield. Monitor. Knows to call for help out of bed.

## 2022-08-06 NOTE — PROGRESS NOTES
Patient Name:  Belkys Mosley   MRN:  [id number]  Age:  32 year old    YOB: 1990      POSTPARTUM PROGRESS NOTE    Pt is PPD#1 s/p vaginal delivery.  She is doing well without complaints.  Pt is ambulating, voiding, tolerating a regular diet.  Pain is well controlled and lochia is within normal limits.  She is breastfeeding.  Baby is doing well.    Objective:    Temp:  [97.8  F (36.6  C)-98.9  F (37.2  C)] 98  F (36.7  C)  Pulse:  [70] 70  Resp:  [16-18] 18  BP: ()/(42-97) 120/65  SpO2:  [98 %-100 %] 100 %  189 lbs 0 oz    General Appearance:  NAD  Lungs:  unlabored  Cardiovascular:  RRR  Abdomen:  nontender, nondistended  Fundus:  firm, below the umbilicus      Lower extremities:  trace symmetric edema    Lab Review:    ABO/RH(D)   Date Value Ref Range Status   2022 A POS  Final     Hemoglobin   Date Value Ref Range Status   2022 12.5 11.7 - 15.7 g/dL Final   2018 9.4 (L) 11.7 - 15.7 g/dL Final     Hematocrit   Date Value Ref Range Status   2022 37.8 35.0 - 47.0 % Final       Lab Results   Component Value Date    WBC 17.5 2022     Lab Results   Component Value Date    RBC 4.27 2022     Lab Results   Component Value Date    HGB 12.5 2022    HGB 9.4 2018     Lab Results   Component Value Date    HCT 37.8 2022     No components found for: MCT  Lab Results   Component Value Date    MCV 89 2022     Lab Results   Component Value Date    MCH 29.3 2022     Lab Results   Component Value Date    MCHC 33.1 2022     Lab Results   Component Value Date    RDW 12.8 2022     Lab Results   Component Value Date     2022     2018       Assessment: 33yo  PPD#1 s/p vaginal delivery, doing well.    Plan:   - Postpartum: recovering well. Pain well controlled. Cont PO pain meds and regular diet. Encourage ambulation.  - h/o Gastric band  - Contraception: condoms  - Dispo: anticipate DC PPD#2      Suzette  Chan, MD [facility]  August 6, 2022

## 2022-08-06 NOTE — PLAN OF CARE
Vaginal delivery, POD1. VSS and Postpartum checks WNL - see flow record  Patient able to empty bladder independently and is up ambulating. Patient performing self cares, is attempting to breast feed infant Q. 2 hours. Latch score (3).  Patient encouraged to hand express colostrum for baby -- provided demonstration, teach-back w/ rationale. Nipple cream and hand expression cup provided. Patient c/o intermittent perineal pain - pain well controlled with ibuprofen, tucks, and benzocaine spray. Postive attachment behaviors observed between infant and both parents. FOB was present during part of the shift and was attentive to patient and infant. Continue current plan of care.  Anticipate discharge on 8/7.

## 2022-08-06 NOTE — PROVIDER NOTIFICATION
08/05/22 2100   Provider Notification   Provider Name/Title Dr. Felix   Method of Notification Phone   Notification Reason Other   MD updated of BP of 140/70. Two most recent blood pressures WNL. No new orders.

## 2022-08-06 NOTE — PLAN OF CARE
Pt able to get some rest during the night. States ordered pain medications, ice packs and tucks decreasing dorota discomfort.  Able to ambulate and void independently.  Spouse present and supportive.

## 2022-08-06 NOTE — PLAN OF CARE
Data: Belkys Mosley transferred to [facility] via wheelchair at 2030. Baby transferred via parent's arms.  Action: Receiving unit notified of transfer: Yes. Patient and family notified of room change. Report given to Nieves at 2030. Belongings sent to receiving unit. Accompanied by Registered Nurse. Oriented patient to surroundings. Call light within reach. ID bands double-checked with receiving RN.  Response: Patient tolerated transfer and is stable.

## 2022-08-06 NOTE — PLAN OF CARE
Pt meeting expected outcomes. Vitals stable. Fundus firm and midline. Denies difficulty voiding. Pain controlled with ibuprofen. Independent with self cares. Breastfeeding , slowly improving, using a shield. Encouraged hand expression, skin to skin and to call out for help if needs assistance with feeds. Anticipated discharge home tomorrow.

## 2022-08-07 VITALS
WEIGHT: 189 LBS | HEART RATE: 74 BPM | RESPIRATION RATE: 16 BRPM | HEIGHT: 63 IN | BODY MASS INDEX: 33.49 KG/M2 | DIASTOLIC BLOOD PRESSURE: 73 MMHG | OXYGEN SATURATION: 100 % | SYSTOLIC BLOOD PRESSURE: 113 MMHG | TEMPERATURE: 98 F

## 2022-08-07 PROCEDURE — 250N000013 HC RX MED GY IP 250 OP 250 PS 637: Performed by: OBSTETRICS & GYNECOLOGY

## 2022-08-07 RX ADMIN — IBUPROFEN 800 MG: 800 TABLET, FILM COATED ORAL at 02:26

## 2022-08-07 RX ADMIN — IBUPROFEN 800 MG: 800 TABLET, FILM COATED ORAL at 09:11

## 2022-08-07 RX ADMIN — DOCUSATE SODIUM 100 MG: 100 CAPSULE, LIQUID FILLED ORAL at 09:11

## 2022-08-07 ASSESSMENT — ACTIVITIES OF DAILY LIVING (ADL)
ADLS_ACUITY_SCORE: 18

## 2022-08-07 NOTE — PLAN OF CARE
Patient meeting all expected outcomes. Discharge instructions given, all questions answered. Follow up expectations reviewed. Medications given and instructions for use. Has breast pump at home. Patient discharged home with  and significant other.

## 2022-08-07 NOTE — PLAN OF CARE
VSS. Pain well managed with ibuprofen. Ambulating and voiding without difficulty. Tolerating regular diet. Breastfeeding using nipple shield and caring for infant independently. SO at bedside and supportive.

## 2022-08-07 NOTE — PLAN OF CARE
Care from 2231-1440    Data: Vital signs within normal limits. Postpartum checks within normal limits - see flow record. Patient eating and drinking normally. Patient able to empty bladder independently and is up ambulating. Patient performing self cares and is able to care for infant. Patient is working on breastfeeding using a nipple shield when needed, educated on hand expression, colostrum noted, encouraged to call nurse for assistance,   Action: Patient medicated during the shift for cramping with ibuprofen. See MAR. Using ice packs, tucks pads, and benzocaine for perineum discomfort.  Patient reassessed within 1 hour after each medication and pain was improved. Patient education done. See flow record.  Response: Positive attachment behaviors observed with infant. Support person present.   Plan: Anticipate discharge on 8/7.

## 2022-08-07 NOTE — PROGRESS NOTES
"[facility] Postpartum Note    S:  Belkys Mosley feels well this morning. Was not able to sleep last night. Pain control adequate. Lochia minimal. Voiding. Breast feeding. Mood Good.     O:  Vitals were reviewed  Blood pressure 113/73, pulse 74, temperature 98  F (36.7  C), temperature source Oral, resp. rate 16, height 1.6 m (5' 3\"), weight 85.7 kg (189 lb), SpO2 100 %, unknown if currently breastfeeding.      General: healthy, alert and no distress  Abd: soft, appropriately tender, fundus firm  Legs: Non-tender, 0+ pitting edema    No results found for: RH  Rubella: immune    Assessment and Plan:   Postpartum Day #2, status post vaginal delivery, doing well.  -- Discharge home  -- F/U 6 weeks w/ Primary OB  -- Discharge meds: see med rec  -- Contraception: condoms    - h/o Gastric band  -stable    Nai Felix MD on 8/7/2022 at 12:36 PM        "

## 2022-08-07 NOTE — PLAN OF CARE
Pt meeting expected outcomes. Vitals stable. Fundus firm and midline. Denies difficulty voiding. Pain controlled with ibuprofen. Independent with self and infant cares. Breastfeeding , using a shield. Pt able to latch baby on own, feedings are going better. Plan to discharge later today.

## 2022-09-07 ENCOUNTER — MEDICAL CORRESPONDENCE (OUTPATIENT)
Dept: HEALTH INFORMATION MANAGEMENT | Facility: CLINIC | Age: 32
End: 2022-09-07

## 2022-10-15 ENCOUNTER — HEALTH MAINTENANCE LETTER (OUTPATIENT)
Age: 32
End: 2022-10-15

## 2022-10-17 ENCOUNTER — OFFICE VISIT (OUTPATIENT)
Dept: PODIATRY | Facility: CLINIC | Age: 32
End: 2022-10-17
Payer: COMMERCIAL

## 2022-10-17 VITALS
BODY MASS INDEX: 28.35 KG/M2 | HEIGHT: 63 IN | SYSTOLIC BLOOD PRESSURE: 116 MMHG | DIASTOLIC BLOOD PRESSURE: 74 MMHG | WEIGHT: 160 LBS

## 2022-10-17 DIAGNOSIS — L60.0 ONYCHOCRYPTOSIS: Primary | ICD-10-CM

## 2022-10-17 PROCEDURE — 99203 OFFICE O/P NEW LOW 30 MIN: CPT | Mod: 25 | Performed by: PODIATRIST

## 2022-10-17 PROCEDURE — 11730 AVULSION NAIL PLATE SIMPLE 1: CPT | Mod: TA | Performed by: PODIATRIST

## 2022-10-17 NOTE — PATIENT INSTRUCTIONS
Thank you for choosing [facility] / Foot & Ankle Surgery!    DR. PANTOJA'S CLINIC LOCATIONS:     [facility] (Friday) TRIAGE LINE: [phone] [address]  APPOINTMENTS: [phone]   [facility] [address] RADIOLOGY: [phone]    PHYSICAL THERAPY: [phone]    SET UP SURGERY: [phone]   [facility] (Mon-Tues AM-Thurs) BILLING QUESTIONS: [phone]   [address] FAX: [phone]   [place] [address]      [place] TOENAIL REMOVAL AFTERCARE  1. After the procedure, go home and elevate the foot/feet for the remainder of the day/evening as able. This is to minimize swelling, control pain, and limit post-procedural complications. The pre-procedural injection may cause your toe to be numb anywhere from 1-2 hours.    2. You can take Tylenol, Ibuprofen, Advil, etc as needed for pain if tolerated. Follow label instructions.     3. If you have been given a prescription for antibiotics, take them as instructed and complete the entire prescription.    4. Keep dressing intact until the following morning. Then remove the bandage (you may need to soak it in warm soapy water as the bandage will likely adhere to your skin).    5. Start soaking in warm soapy water for 5-10 minutes twice a day. Wash the toe thoroughly, dry the toe thoroughly. Apply antibiotic wound ointment to base of wound and cover with gauze and Coban dressing (not too tightly) until it stops draining. This may take a few days to weeks, but at that point, you may continue with antibiotic ointment and a band-aid, or you may stop applying a dressing all together. Dressing changes should be done twice daily if you had the permanent/chemical procedure done.    6. You may do activities as tolerated the following day. Find a shoe that is comfortable and minimizes the amount of rubbing on your toe, as this may increase pain, swelling, etc.    7. Monitor for signs of infection. With this procedure, it is common to  have mild surrounding redness and drainage. If the redness involves the entire toe or if you notice red streaks on top of your foot, or if you experience any nausea, vomiting, chills, fevers > 101 degrees, call clinic for a quick appointment.

## 2022-10-17 NOTE — LETTER
"    10/17/2022         RE: Belkys Spears  [address] [place] [address]        Dear Colleague,    Thank you for referring your patient, Belkys Spears, to the [facility]. Please see a copy of my visit note below.    Foot & Ankle Surgery  October 17, 2022    CC: Ingrown nail    I was asked to see Belkys Spears regarding the chief complaint by: Self    HPI:  Pt is a 32 year old female who presents with above complaint.  Recent history of painful ingrown nail medial left hallux.  She tried to remove some of this herself and has noticed some worsening inflammation and discomfort.  No previous ingrown nail procedures.  She states her  has had longstanding history of ingrown nails and severe infections and she wanted this evaluated before he got to that point    ROS:   Pos for CC.  The patient denies current nausea, vomiting, chills, fevers, belly pain, calf pain, chest pain or SOB.  Complete remainder of ROS is otherwise neg.    VITALS:    Vitals:    10/17/22 1536   BP: 116/74   Weight: 72.6 kg (160 lb)   Height: 1.6 m (5' 3\")       PMH:    Past Medical History:   Diagnosis Date     Gastroesophageal reflux disease        SXHX:    Past Surgical History:   Procedure Laterality Date     ANKLE SURGERY  [id number]    Right     BARIATRIC SURGERY       GI SURGERY      Laparoscopic adjustable gastric banding 2015     LAPAROSCOPIC REMOVAL GASTRIC ADJUSTABLE BAND N/A 4/13/2018    Procedure: LAPAROSCOPIC REMOVAL GASTRIC ADJUSTABLE BAND;  LAPAROSCOPIC REMOVAL AND REPLACEMENT GASTRIC BAND DEVICE AND PORT ;  Surgeon: Larry Shearer MD;  Location: [facility] OR     ORTHOPEDIC SURGERY      Right ankle surgery 2005        MEDS:    Current Outpatient Medications   Medication     benzocaine (AMERICAINE) 20 % external aerosol     docusate sodium (COLACE) 100 MG capsule     hydrocortisone 2.5 % cream     hydrocortisone, Perianal, (ANUSOL-HC) 2.5 % cream     ibuprofen (ADVIL/MOTRIN) 800 " MG tablet     lanolin ointment     No current facility-administered medications for this visit.       ALL:   No Known Allergies    FMH:    Family History   Problem Relation Age of Onset     Breast Cancer Paternal Grandfather        SocHx:    Social History     Socioeconomic History     Marital status:      Spouse name: Not on file     Number of children: Not on file     Years of education: Not on file     Highest education level: Not on file   Occupational History     Not on file   Tobacco Use     Smoking status: Never     Smokeless tobacco: Never   Substance and Sexual Activity     Alcohol use: Yes     Comment: Socially     Drug use: No     Sexual activity: Not on file   Other Topics Concern     Parent/sibling w/ CABG, MI or angioplasty before 65F 55M? Not Asked   Social History Narrative     Not on file     Social Determinants of Health     Financial Resource Strain: Not on file   Food Insecurity: Not on file   Transportation Needs: Not on file   Physical Activity: Not on file   Stress: Not on file   Social Connections: Not on file   Intimate Partner Violence: Not on file   Housing Stability: Not on file           EXAMINATION:  Gen:   No apparent distress  Neuro:   A&Ox3, no deficits  Psych:    Answering questions appropriately for age and situation with normal affect  Head:    NCAT  Eye:    Visual scanning without deficit  Ear:    Response to auditory stimuli wnl  Lung:    Non-labored breathing on RA noted  Abd:    NTND per patient report  Lymph:    Neg for pitting/non-pitting edema BLE  Vasc:    Pulses palpable, CFT minimally delayed  Neuro:    Light touch sensation intact to all sensory nerve distributions without paresthesias  Derm:    Mild onychocryptosis medial left hallux with low-grade paronychia.  No purulence or cellulitis is seen.  MSK:    ROM, strength wnl without limitation, no pain on palpation noted.  Calf:    Neg for redness, swelling or tenderness    Assessment:  32 year old female with  onychocryptosis medial left hallux with low-grade paronychia      Plan:  Discussed etiologies, anatomy and options  1.  Onychocryptosis medial left hallux with low-grade paronychia  -I personally reviewed and interpreted the patient's lower extremity history pertinent to today's visit, including imaging/labs, in preparation for initiating a treatment program.  -Regarding the nail, treatment options were discussed.  They elected to proceed with a procedure, Partial temporary avulsion.  See procedure note for details.  Risks that were discussed include but are not limited to infection, wound healing complications, nerve irritation, recurrence of the ingrown nail and the need for further procedures.  Antibiotic:  None needed    After discussing the procedure, as well as risks, complications and post-procedure instructions, informed consent was obtained.    Anesthesia:  5 cc's of  1% lidocaine plain    Procedure:  After adequate prep, and with anesthesia achieved,  attention was directed to the medial border of the L hallux where the nail plate was freed from surrounding soft tissue.  The offending border was  using an English Anvil and then removed in total.  The base of the wound was explored and showed no necrotic tissue, purulence or debris.   A clean dressing was applied loosely to prevent vascular insult.  The patient tolerated the procedure well without complications.    Post-procedural instructions were dispensed and discussed with the patient.  All questions were answered.          Follow up:  prn or sooner with acute issues      Patient's medical history was reviewed today      Jason Szymanski DPM FACFAS FACFAOM  Podiatric Foot & Ankle Surgeon  [facility]  [phone]    Disclaimer: This note consists of symbols derived from keyboarding, dictation and/or voice recognition software. As a result, there may be errors in the script that have gone undetected. Please consider this when  interpreting information found in this chart.            Again, thank you for allowing me to participate in the care of your patient.        Sincerely,        Jason Szymanski DPM, MAYE

## 2022-10-17 NOTE — PROGRESS NOTES
"Foot & Ankle Surgery  October 17, 2022    CC: Ingrown nail    I was asked to see Belkys Spears regarding the chief complaint by: Self    HPI:  Pt is a 32 year old female who presents with above complaint.  Recent history of painful ingrown nail medial left hallux.  She tried to remove some of this herself and has noticed some worsening inflammation and discomfort.  No previous ingrown nail procedures.  She states her  has had longstanding history of ingrown nails and severe infections and she wanted this evaluated before he got to that point    ROS:   Pos for CC.  The patient denies current nausea, vomiting, chills, fevers, belly pain, calf pain, chest pain or SOB.  Complete remainder of ROS is otherwise neg.    VITALS:    Vitals:    10/17/22 1536   BP: 116/74   Weight: 72.6 kg (160 lb)   Height: 1.6 m (5' 3\")       PMH:    Past Medical History:   Diagnosis Date     Gastroesophageal reflux disease        SXHX:    Past Surgical History:   Procedure Laterality Date     ANKLE SURGERY  [id number]    Right     BARIATRIC SURGERY       GI SURGERY      Laparoscopic adjustable gastric banding 2015     LAPAROSCOPIC REMOVAL GASTRIC ADJUSTABLE BAND N/A 4/13/2018    Procedure: LAPAROSCOPIC REMOVAL GASTRIC ADJUSTABLE BAND;  LAPAROSCOPIC REMOVAL AND REPLACEMENT GASTRIC BAND DEVICE AND PORT ;  Surgeon: Larry Shearer MD;  Location: [facility] OR     ORTHOPEDIC SURGERY      Right ankle surgery 2005        MEDS:    Current Outpatient Medications   Medication     benzocaine (AMERICAINE) 20 % external aerosol     docusate sodium (COLACE) 100 MG capsule     hydrocortisone 2.5 % cream     hydrocortisone, Perianal, (ANUSOL-HC) 2.5 % cream     ibuprofen (ADVIL/MOTRIN) 800 MG tablet     lanolin ointment     No current facility-administered medications for this visit.       ALL:   No Known Allergies    FMH:    Family History   Problem Relation Age of Onset     Breast Cancer Paternal Grandfather        SocHx:    Social History "     Socioeconomic History     Marital status:      Spouse name: Not on file     Number of children: Not on file     Years of education: Not on file     Highest education level: Not on file   Occupational History     Not on file   Tobacco Use     Smoking status: Never     Smokeless tobacco: Never   Substance and Sexual Activity     Alcohol use: Yes     Comment: Socially     Drug use: No     Sexual activity: Not on file   Other Topics Concern     Parent/sibling w/ CABG, MI or angioplasty before 65F 55M? Not Asked   Social History Narrative     Not on file     Social Determinants of Health     Financial Resource Strain: Not on file   Food Insecurity: Not on file   Transportation Needs: Not on file   Physical Activity: Not on file   Stress: Not on file   Social Connections: Not on file   Intimate Partner Violence: Not on file   Housing Stability: Not on file           EXAMINATION:  Gen:   No apparent distress  Neuro:   A&Ox3, no deficits  Psych:    Answering questions appropriately for age and situation with normal affect  Head:    NCAT  Eye:    Visual scanning without deficit  Ear:    Response to auditory stimuli wnl  Lung:    Non-labored breathing on RA noted  Abd:    NTND per patient report  Lymph:    Neg for pitting/non-pitting edema BLE  Vasc:    Pulses palpable, CFT minimally delayed  Neuro:    Light touch sensation intact to all sensory nerve distributions without paresthesias  Derm:    Mild onychocryptosis medial left hallux with low-grade paronychia.  No purulence or cellulitis is seen.  MSK:    ROM, strength wnl without limitation, no pain on palpation noted.  Calf:    Neg for redness, swelling or tenderness    Assessment:  32 year old female with onychocryptosis medial left hallux with low-grade paronychia      Plan:  Discussed etiologies, anatomy and options  1.  Onychocryptosis medial left hallux with low-grade paronychia  -I personally reviewed and interpreted the patient's lower extremity history  pertinent to today's visit, including imaging/labs, in preparation for initiating a treatment program.  -Regarding the nail, treatment options were discussed.  They elected to proceed with a procedure, Partial temporary avulsion.  See procedure note for details.  Risks that were discussed include but are not limited to infection, wound healing complications, nerve irritation, recurrence of the ingrown nail and the need for further procedures.  Antibiotic:  None needed    After discussing the procedure, as well as risks, complications and post-procedure instructions, informed consent was obtained.    Anesthesia:  5 cc's of  1% lidocaine plain    Procedure:  After adequate prep, and with anesthesia achieved,  attention was directed to the medial border of the L hallux where the nail plate was freed from surrounding soft tissue.  The offending border was  using an English Anvil and then removed in total.  The base of the wound was explored and showed no necrotic tissue, purulence or debris.   A clean dressing was applied loosely to prevent vascular insult.  The patient tolerated the procedure well without complications.    Post-procedural instructions were dispensed and discussed with the patient.  All questions were answered.          Follow up:  prn or sooner with acute issues      Patient's medical history was reviewed today      Jason Szymanski DPM FACFAS FACFAOM  Podiatric Foot & Ankle Surgeon  [facility]  [phone]    Disclaimer: This note consists of symbols derived from keyboarding, dictation and/or voice recognition software. As a result, there may be errors in the script that have gone undetected. Please consider this when interpreting information found in this chart.

## 2023-03-26 ENCOUNTER — HEALTH MAINTENANCE LETTER (OUTPATIENT)
Age: 33
End: 2023-03-26

## 2024-05-26 ENCOUNTER — HEALTH MAINTENANCE LETTER (OUTPATIENT)
Age: 34
End: 2024-05-26

## 2024-06-03 NOTE — ANESTHESIA POSTPROCEDURE EVALUATION
Patient: Belkys Mosley    Procedure(s):  LAPAROSCOPIC REMOVAL AND REPLACEMENT GASTRIC BAND DEVICE AND PORT  - Wound Class: I-Clean    Diagnosis:other  complications of other bariatric procedure   Diagnosis Additional Information: No value filed.    Anesthesia Type:  General, ETT    Note:  Anesthesia Post Evaluation    Patient location during evaluation: PACU  Patient participation: Able to fully participate in evaluation  Level of consciousness: awake and alert  Pain management: satisfactory to patient  Airway patency: patent  Cardiovascular status: hemodynamically stable  Respiratory status: acceptable and unassisted  Hydration status: balanced  PONV: none     Anesthetic complications: None          Last vitals:  Vitals:    04/13/18 1200 04/13/18 1210 04/13/18 1401   BP: 143/85 138/81 129/81   Resp: 14 14    Temp:      SpO2: 98% 99%          Electronically Signed By: Kavon Foley MD  April 13, 2018  2:12 PM   "My family"

## 2024-06-27 ENCOUNTER — HOSPITAL ENCOUNTER (INPATIENT)
Facility: CLINIC | Age: 34
LOS: 2 days | Discharge: HOME | End: 2024-06-30
Attending: STUDENT IN AN ORGANIZED HEALTH CARE EDUCATION/TRAINING PROGRAM | Admitting: STUDENT IN AN ORGANIZED HEALTH CARE EDUCATION/TRAINING PROGRAM
Payer: COMMERCIAL

## 2024-06-28 ENCOUNTER — ANESTHESIA (OUTPATIENT)
Dept: OBGYN | Facility: CLINIC | Age: 34
End: 2024-06-28
Payer: COMMERCIAL

## 2024-06-28 ENCOUNTER — ANESTHESIA EVENT (OUTPATIENT)
Dept: OBGYN | Facility: CLINIC | Age: 34
End: 2024-06-28
Payer: COMMERCIAL

## 2024-06-28 LAB
ABO/RH(D): NORMAL
ANTIBODY SCREEN: NEGATIVE
HGB BLD-MCNC: 12.9 G/DL (ref 11.7–15.7)
SPECIMEN EXPIRATION DATE: NORMAL
T PALLIDUM AB SER QL: NONREACTIVE

## 2024-06-28 PROCEDURE — 86900 BLOOD TYPING SEROLOGIC ABO: CPT | Performed by: STUDENT IN AN ORGANIZED HEALTH CARE EDUCATION/TRAINING PROGRAM

## 2024-06-28 PROCEDURE — 722N000001 HC LABOR CARE VAGINAL DELIVERY SINGLE

## 2024-06-28 PROCEDURE — 86780 TREPONEMA PALLIDUM: CPT | Performed by: STUDENT IN AN ORGANIZED HEALTH CARE EDUCATION/TRAINING PROGRAM

## 2024-06-28 PROCEDURE — 250N000011 HC RX IP 250 OP 636: Mod: JW | Performed by: ANESTHESIOLOGY

## 2024-06-28 PROCEDURE — 120N000001 HC R&B MED SURG/OB

## 2024-06-28 PROCEDURE — 250N000011 HC RX IP 250 OP 636: Performed by: ANESTHESIOLOGY

## 2024-06-28 PROCEDURE — 250N000011 HC RX IP 250 OP 636: Performed by: STUDENT IN AN ORGANIZED HEALTH CARE EDUCATION/TRAINING PROGRAM

## 2024-06-28 PROCEDURE — 00HU33Z INSERTION OF INFUSION DEVICE INTO SPINAL CANAL, PERCUTANEOUS APPROACH: ICD-10-PCS | Performed by: ANESTHESIOLOGY

## 2024-06-28 PROCEDURE — 85018 HEMOGLOBIN: CPT | Performed by: STUDENT IN AN ORGANIZED HEALTH CARE EDUCATION/TRAINING PROGRAM

## 2024-06-28 PROCEDURE — 3E0R3BZ INTRODUCTION OF ANESTHETIC AGENT INTO SPINAL CANAL, PERCUTANEOUS APPROACH: ICD-10-PCS | Performed by: ANESTHESIOLOGY

## 2024-06-28 PROCEDURE — 250N000009 HC RX 250: Performed by: STUDENT IN AN ORGANIZED HEALTH CARE EDUCATION/TRAINING PROGRAM

## 2024-06-28 PROCEDURE — 258N000003 HC RX IP 258 OP 636: Performed by: STUDENT IN AN ORGANIZED HEALTH CARE EDUCATION/TRAINING PROGRAM

## 2024-06-28 PROCEDURE — 370N000003 HC ANESTHESIA WARD SERVICE: Performed by: ANESTHESIOLOGY

## 2024-06-28 PROCEDURE — 999N000080 HC STATISTIC IP LACTATION SERVICES 16-30 MIN

## 2024-06-28 PROCEDURE — 250N000013 HC RX MED GY IP 250 OP 250 PS 637: Performed by: STUDENT IN AN ORGANIZED HEALTH CARE EDUCATION/TRAINING PROGRAM

## 2024-06-28 RX ORDER — IBUPROFEN 800 MG/1
800 TABLET, FILM COATED ORAL
Status: DISCONTINUED | OUTPATIENT
Start: 2024-06-28 | End: 2024-06-28

## 2024-06-28 RX ORDER — HYDROCORTISONE 25 MG/G
CREAM TOPICAL 3 TIMES DAILY PRN
Status: DISCONTINUED | OUTPATIENT
Start: 2024-06-28 | End: 2024-06-30 | Stop reason: HOSPADM

## 2024-06-28 RX ORDER — LIDOCAINE HYDROCHLORIDE AND EPINEPHRINE 15; 5 MG/ML; UG/ML
3 INJECTION, SOLUTION EPIDURAL
Status: DISCONTINUED | OUTPATIENT
Start: 2024-06-28 | End: 2024-06-28 | Stop reason: HOSPADM

## 2024-06-28 RX ORDER — CARBOPROST TROMETHAMINE 250 UG/ML
250 INJECTION, SOLUTION INTRAMUSCULAR
Status: DISCONTINUED | OUTPATIENT
Start: 2024-06-28 | End: 2024-06-28 | Stop reason: HOSPADM

## 2024-06-28 RX ORDER — NALOXONE HYDROCHLORIDE 0.4 MG/ML
0.4 INJECTION, SOLUTION INTRAMUSCULAR; INTRAVENOUS; SUBCUTANEOUS
Status: DISCONTINUED | OUTPATIENT
Start: 2024-06-28 | End: 2024-06-28 | Stop reason: HOSPADM

## 2024-06-28 RX ORDER — LOPERAMIDE HCL 2 MG
4 CAPSULE ORAL
Status: DISCONTINUED | OUTPATIENT
Start: 2024-06-28 | End: 2024-06-28 | Stop reason: HOSPADM

## 2024-06-28 RX ORDER — MISOPROSTOL 200 UG/1
400 TABLET ORAL
Status: DISCONTINUED | OUTPATIENT
Start: 2024-06-28 | End: 2024-06-28 | Stop reason: HOSPADM

## 2024-06-28 RX ORDER — METHYLERGONOVINE MALEATE 0.2 MG/ML
200 INJECTION INTRAVENOUS
Status: DISCONTINUED | OUTPATIENT
Start: 2024-06-28 | End: 2024-06-28 | Stop reason: HOSPADM

## 2024-06-28 RX ORDER — OXYTOCIN 10 [USP'U]/ML
10 INJECTION, SOLUTION INTRAMUSCULAR; INTRAVENOUS
Status: DISCONTINUED | OUTPATIENT
Start: 2024-06-28 | End: 2024-06-28 | Stop reason: HOSPADM

## 2024-06-28 RX ORDER — KETOROLAC TROMETHAMINE 30 MG/ML
30 INJECTION, SOLUTION INTRAMUSCULAR; INTRAVENOUS
Status: DISCONTINUED | OUTPATIENT
Start: 2024-06-28 | End: 2024-06-28

## 2024-06-28 RX ORDER — NALOXONE HYDROCHLORIDE 0.4 MG/ML
0.4 INJECTION, SOLUTION INTRAMUSCULAR; INTRAVENOUS; SUBCUTANEOUS
Status: DISCONTINUED | OUTPATIENT
Start: 2024-06-28 | End: 2024-06-30 | Stop reason: HOSPADM

## 2024-06-28 RX ORDER — LOPERAMIDE HCL 2 MG
2 CAPSULE ORAL
Status: DISCONTINUED | OUTPATIENT
Start: 2024-06-28 | End: 2024-06-28 | Stop reason: HOSPADM

## 2024-06-28 RX ORDER — DOCUSATE SODIUM 100 MG/1
100 CAPSULE, LIQUID FILLED ORAL DAILY
Status: DISCONTINUED | OUTPATIENT
Start: 2024-06-28 | End: 2024-06-30 | Stop reason: HOSPADM

## 2024-06-28 RX ORDER — CARBOPROST TROMETHAMINE 250 UG/ML
250 INJECTION, SOLUTION INTRAMUSCULAR
Status: DISCONTINUED | OUTPATIENT
Start: 2024-06-28 | End: 2024-06-30 | Stop reason: HOSPADM

## 2024-06-28 RX ORDER — ONDANSETRON 4 MG/1
4 TABLET, ORALLY DISINTEGRATING ORAL EVERY 6 HOURS PRN
Status: DISCONTINUED | OUTPATIENT
Start: 2024-06-28 | End: 2024-06-28 | Stop reason: HOSPADM

## 2024-06-28 RX ORDER — IBUPROFEN 800 MG/1
800 TABLET, FILM COATED ORAL EVERY 6 HOURS PRN
Qty: 40 TABLET | Refills: 1 | Status: SHIPPED | OUTPATIENT
Start: 2024-06-28

## 2024-06-28 RX ORDER — OXYTOCIN 10 [USP'U]/ML
10 INJECTION, SOLUTION INTRAMUSCULAR; INTRAVENOUS
Status: DISCONTINUED | OUTPATIENT
Start: 2024-06-28 | End: 2024-06-30 | Stop reason: HOSPADM

## 2024-06-28 RX ORDER — METOCLOPRAMIDE HYDROCHLORIDE 5 MG/ML
10 INJECTION INTRAMUSCULAR; INTRAVENOUS EVERY 6 HOURS PRN
Status: DISCONTINUED | OUTPATIENT
Start: 2024-06-28 | End: 2024-06-28 | Stop reason: HOSPADM

## 2024-06-28 RX ORDER — PENICILLIN G 3000000 [IU]/50ML
3 INJECTION, SOLUTION INTRAVENOUS EVERY 4 HOURS
Status: DISCONTINUED | OUTPATIENT
Start: 2024-06-28 | End: 2024-06-28 | Stop reason: HOSPADM

## 2024-06-28 RX ORDER — HYDROCORTISONE 25 MG/G
CREAM TOPICAL 3 TIMES DAILY PRN
Qty: 30 G | Refills: 0 | Status: SHIPPED | OUTPATIENT
Start: 2024-06-28

## 2024-06-28 RX ORDER — LOPERAMIDE HCL 2 MG
4 CAPSULE ORAL
Status: DISCONTINUED | OUTPATIENT
Start: 2024-06-28 | End: 2024-06-30 | Stop reason: HOSPADM

## 2024-06-28 RX ORDER — MODIFIED LANOLIN
OINTMENT (GRAM) TOPICAL
Status: DISCONTINUED | OUTPATIENT
Start: 2024-06-28 | End: 2024-06-30 | Stop reason: HOSPADM

## 2024-06-28 RX ORDER — BUPIVACAINE HYDROCHLORIDE 2.5 MG/ML
10 INJECTION, SOLUTION EPIDURAL; INFILTRATION; INTRACAUDAL ONCE
Status: DISCONTINUED | OUTPATIENT
Start: 2024-06-28 | End: 2024-06-28 | Stop reason: HOSPADM

## 2024-06-28 RX ORDER — METOCLOPRAMIDE 10 MG/1
10 TABLET ORAL EVERY 6 HOURS PRN
Status: DISCONTINUED | OUTPATIENT
Start: 2024-06-28 | End: 2024-06-28 | Stop reason: HOSPADM

## 2024-06-28 RX ORDER — FENTANYL CITRATE-0.9 % NACL/PF 10 MCG/ML
100 PLASTIC BAG, INJECTION (ML) INTRAVENOUS EVERY 5 MIN PRN
Status: DISCONTINUED | OUTPATIENT
Start: 2024-06-28 | End: 2024-06-28 | Stop reason: HOSPADM

## 2024-06-28 RX ORDER — ONDANSETRON 4 MG/1
4 TABLET, ORALLY DISINTEGRATING ORAL EVERY 6 HOURS PRN
Status: DISCONTINUED | OUTPATIENT
Start: 2024-06-28 | End: 2024-06-28

## 2024-06-28 RX ORDER — NALOXONE HYDROCHLORIDE 0.4 MG/ML
0.2 INJECTION, SOLUTION INTRAMUSCULAR; INTRAVENOUS; SUBCUTANEOUS
Status: DISCONTINUED | OUTPATIENT
Start: 2024-06-28 | End: 2024-06-28 | Stop reason: HOSPADM

## 2024-06-28 RX ORDER — BUPIVACAINE HYDROCHLORIDE 2.5 MG/ML
INJECTION, SOLUTION EPIDURAL; INFILTRATION; INTRACAUDAL
Status: COMPLETED | OUTPATIENT
Start: 2024-06-28 | End: 2024-06-28

## 2024-06-28 RX ORDER — METHYLERGONOVINE MALEATE 0.2 MG/ML
200 INJECTION INTRAVENOUS
Status: DISCONTINUED | OUTPATIENT
Start: 2024-06-28 | End: 2024-06-30 | Stop reason: HOSPADM

## 2024-06-28 RX ORDER — MISOPROSTOL 200 UG/1
800 TABLET ORAL
Status: DISCONTINUED | OUTPATIENT
Start: 2024-06-28 | End: 2024-06-28 | Stop reason: HOSPADM

## 2024-06-28 RX ORDER — OXYTOCIN/0.9 % SODIUM CHLORIDE 30/500 ML
340 PLASTIC BAG, INJECTION (ML) INTRAVENOUS CONTINUOUS PRN
Status: DISCONTINUED | OUTPATIENT
Start: 2024-06-28 | End: 2024-06-30 | Stop reason: HOSPADM

## 2024-06-28 RX ORDER — BISACODYL 10 MG
10 SUPPOSITORY, RECTAL RECTAL DAILY PRN
Status: DISCONTINUED | OUTPATIENT
Start: 2024-06-28 | End: 2024-06-30 | Stop reason: HOSPADM

## 2024-06-28 RX ORDER — MODIFIED LANOLIN
OINTMENT (GRAM) TOPICAL
Qty: 7 G | Refills: 3 | Status: SHIPPED | OUTPATIENT
Start: 2024-06-28

## 2024-06-28 RX ORDER — ACETAMINOPHEN 325 MG/1
650 TABLET ORAL EVERY 4 HOURS PRN
Qty: 40 TABLET | Refills: 1 | Status: SHIPPED | OUTPATIENT
Start: 2024-06-28

## 2024-06-28 RX ORDER — MISOPROSTOL 200 UG/1
400 TABLET ORAL
Status: DISCONTINUED | OUTPATIENT
Start: 2024-06-28 | End: 2024-06-30 | Stop reason: HOSPADM

## 2024-06-28 RX ORDER — IBUPROFEN 800 MG/1
800 TABLET, FILM COATED ORAL EVERY 6 HOURS PRN
Status: DISCONTINUED | OUTPATIENT
Start: 2024-06-28 | End: 2024-06-30 | Stop reason: HOSPADM

## 2024-06-28 RX ORDER — PENICILLIN G POTASSIUM 5000000 [IU]/1
5 INJECTION, POWDER, FOR SOLUTION INTRAMUSCULAR; INTRAVENOUS ONCE
Status: COMPLETED | OUTPATIENT
Start: 2024-06-28 | End: 2024-06-28

## 2024-06-28 RX ORDER — ACETAMINOPHEN 325 MG/1
650 TABLET ORAL EVERY 4 HOURS PRN
Status: DISCONTINUED | OUTPATIENT
Start: 2024-06-28 | End: 2024-06-30 | Stop reason: HOSPADM

## 2024-06-28 RX ORDER — ONDANSETRON 2 MG/ML
4 INJECTION INTRAMUSCULAR; INTRAVENOUS EVERY 6 HOURS PRN
Status: DISCONTINUED | OUTPATIENT
Start: 2024-06-28 | End: 2024-06-28

## 2024-06-28 RX ORDER — PROCHLORPERAZINE MALEATE 10 MG
10 TABLET ORAL EVERY 6 HOURS PRN
Status: DISCONTINUED | OUTPATIENT
Start: 2024-06-28 | End: 2024-06-28 | Stop reason: HOSPADM

## 2024-06-28 RX ORDER — TRANEXAMIC ACID 10 MG/ML
1 INJECTION, SOLUTION INTRAVENOUS EVERY 30 MIN PRN
Status: DISCONTINUED | OUTPATIENT
Start: 2024-06-28 | End: 2024-06-30 | Stop reason: HOSPADM

## 2024-06-28 RX ORDER — OXYTOCIN/0.9 % SODIUM CHLORIDE 30/500 ML
100-340 PLASTIC BAG, INJECTION (ML) INTRAVENOUS CONTINUOUS PRN
Status: DISCONTINUED | OUTPATIENT
Start: 2024-06-28 | End: 2024-06-30 | Stop reason: HOSPADM

## 2024-06-28 RX ORDER — LOPERAMIDE HCL 2 MG
2 CAPSULE ORAL
Status: DISCONTINUED | OUTPATIENT
Start: 2024-06-28 | End: 2024-06-30 | Stop reason: HOSPADM

## 2024-06-28 RX ORDER — TRANEXAMIC ACID 10 MG/ML
1 INJECTION, SOLUTION INTRAVENOUS EVERY 30 MIN PRN
Status: DISCONTINUED | OUTPATIENT
Start: 2024-06-28 | End: 2024-06-28 | Stop reason: HOSPADM

## 2024-06-28 RX ORDER — NALOXONE HYDROCHLORIDE 0.4 MG/ML
0.2 INJECTION, SOLUTION INTRAMUSCULAR; INTRAVENOUS; SUBCUTANEOUS
Status: DISCONTINUED | OUTPATIENT
Start: 2024-06-28 | End: 2024-06-30 | Stop reason: HOSPADM

## 2024-06-28 RX ORDER — PROCHLORPERAZINE 25 MG
25 SUPPOSITORY, RECTAL RECTAL EVERY 12 HOURS PRN
Status: DISCONTINUED | OUTPATIENT
Start: 2024-06-28 | End: 2024-06-28 | Stop reason: HOSPADM

## 2024-06-28 RX ORDER — MISOPROSTOL 200 UG/1
800 TABLET ORAL
Status: DISCONTINUED | OUTPATIENT
Start: 2024-06-28 | End: 2024-06-30 | Stop reason: HOSPADM

## 2024-06-28 RX ORDER — OXYCODONE HYDROCHLORIDE 5 MG/1
5 TABLET ORAL EVERY 4 HOURS PRN
Status: DISCONTINUED | OUTPATIENT
Start: 2024-06-28 | End: 2024-06-30 | Stop reason: HOSPADM

## 2024-06-28 RX ORDER — ONDANSETRON 2 MG/ML
4 INJECTION INTRAMUSCULAR; INTRAVENOUS EVERY 6 HOURS PRN
Status: DISCONTINUED | OUTPATIENT
Start: 2024-06-28 | End: 2024-06-28 | Stop reason: HOSPADM

## 2024-06-28 RX ORDER — NALBUPHINE HYDROCHLORIDE 20 MG/ML
2.5-5 INJECTION, SOLUTION INTRAMUSCULAR; INTRAVENOUS; SUBCUTANEOUS EVERY 6 HOURS PRN
Status: DISCONTINUED | OUTPATIENT
Start: 2024-06-28 | End: 2024-06-30 | Stop reason: HOSPADM

## 2024-06-28 RX ORDER — CITRIC ACID/SODIUM CITRATE 334-500MG
30 SOLUTION, ORAL ORAL
Status: DISCONTINUED | OUTPATIENT
Start: 2024-06-28 | End: 2024-06-28 | Stop reason: HOSPADM

## 2024-06-28 RX ORDER — OXYTOCIN/0.9 % SODIUM CHLORIDE 30/500 ML
340 PLASTIC BAG, INJECTION (ML) INTRAVENOUS CONTINUOUS PRN
Status: DISCONTINUED | OUTPATIENT
Start: 2024-06-28 | End: 2024-06-28 | Stop reason: HOSPADM

## 2024-06-28 RX ADMIN — Medication: at 00:31

## 2024-06-28 RX ADMIN — Medication 340 ML/HR: at 00:55

## 2024-06-28 RX ADMIN — ACETAMINOPHEN 650 MG: 325 TABLET, FILM COATED ORAL at 06:04

## 2024-06-28 RX ADMIN — ACETAMINOPHEN 650 MG: 325 TABLET, FILM COATED ORAL at 18:25

## 2024-06-28 RX ADMIN — DOCUSATE SODIUM 100 MG: 100 CAPSULE, LIQUID FILLED ORAL at 09:17

## 2024-06-28 RX ADMIN — IBUPROFEN 800 MG: 800 TABLET, FILM COATED ORAL at 02:03

## 2024-06-28 RX ADMIN — IBUPROFEN 800 MG: 800 TABLET, FILM COATED ORAL at 18:25

## 2024-06-28 RX ADMIN — IBUPROFEN 800 MG: 800 TABLET, FILM COATED ORAL at 11:47

## 2024-06-28 RX ADMIN — ACETAMINOPHEN 650 MG: 325 TABLET, FILM COATED ORAL at 02:03

## 2024-06-28 RX ADMIN — BUPIVACAINE HYDROCHLORIDE 10 ML: 2.5 INJECTION, SOLUTION EPIDURAL; INFILTRATION; INTRACAUDAL at 00:27

## 2024-06-28 RX ADMIN — SODIUM CHLORIDE, POTASSIUM CHLORIDE, SODIUM LACTATE AND CALCIUM CHLORIDE 1000 ML: 600; 310; 30; 20 INJECTION, SOLUTION INTRAVENOUS at 00:12

## 2024-06-28 RX ADMIN — PENICILLIN G POTASSIUM 5 MILLION UNITS: 5000000 POWDER, FOR SOLUTION INTRAMUSCULAR; INTRAPLEURAL; INTRATHECAL; INTRAVENOUS at 00:35

## 2024-06-28 ASSESSMENT — ACTIVITIES OF DAILY LIVING (ADL)
ADLS_ACUITY_SCORE: 18

## 2024-06-28 NOTE — PLAN OF CARE
"VSS, pain controlled with ibuprofen, encouraged using ice packs and tucks for perineal discomfort; breast feeding and supplementing infant with formula, seen by lactation RN, talked about pumping with pt.  Problem: Adult Inpatient Plan of Care  Goal: Plan of Care Review  Description: The Plan of Care Review/Shift note should be completed every shift.  The Outcome Evaluation is a brief statement about your assessment that the patient is improving, declining, or no change.  This information will be displayed automatically on your shift  note.  Outcome: Progressing  Flowsheets (Taken 6/28/2024 1500)  Plan of Care Reviewed With:   patient   spouse  Goal: Patient-Specific Goal (Individualized)  Description: You can add care plan individualizations to a care plan. Examples of Individualization might be:  \"Parent requests to be called daily at 9am for status\", \"I have a hard time hearing out of my right ear\", or \"Do not touch me to wake me up as it startles  me\".  Outcome: Progressing  Goal: Absence of Hospital-Acquired Illness or Injury  Outcome: Progressing  Intervention: Prevent Skin Injury  Recent Flowsheet Documentation  Taken 6/28/2024 0800 by Marya Peters RN  Body Position: position changed independently  Goal: Optimal Comfort and Wellbeing  Outcome: Progressing  Intervention: Monitor Pain and Promote Comfort  Recent Flowsheet Documentation  Taken 6/28/2024 1300 by Marya Peters RN  Pain Management Interventions: declines  Taken 6/28/2024 1147 by Marya Peters RN  Pain Management Interventions: medication (see MAR)  Intervention: Provide Person-Centered Care  Recent Flowsheet Documentation  Taken 6/28/2024 0800 by Marya Peters RN  Trust Relationship/Rapport:   care explained   choices provided   emotional support provided   empathic listening provided   questions answered   questions encouraged   reassurance provided   thoughts/feelings acknowledged  Goal: Readiness for " Transition of Care  Outcome: Progressing     Problem: Postpartum (Vaginal Delivery)  Goal: Successful Parent Role Transition  Outcome: Progressing  Intervention: Support Parent Role Transition  Recent Flowsheet Documentation  Taken 6/28/2024 0800 by Marya Peters RN  Supportive Measures:   active listening utilized   decision-making supported  Parent-Child Attachment Promotion:   caring behavior modeled   cue recognition promoted  Goal: Hemostasis  Outcome: Progressing  Goal: Absence of Infection Signs and Symptoms  Outcome: Progressing  Goal: Anesthesia/Sedation Recovery  Outcome: Progressing  Goal: Optimal Pain Control and Function  Outcome: Progressing  Intervention: Prevent or Manage Pain  Recent Flowsheet Documentation  Taken 6/28/2024 1300 by Marya Peters RN  Pain Management Interventions: declines  Taken 6/28/2024 1147 by Marya Peters RN  Pain Management Interventions: medication (see MAR)  Goal: Effective Urinary Elimination  Outcome: Progressing   Goal Outcome Evaluation:      Plan of Care Reviewed With: patient, spouse

## 2024-06-28 NOTE — PLAN OF CARE
"Goal Outcome Evaluation:      Plan of Care Reviewed With: patient, spouse    Overall Patient Progress: improving  Delivered healthy male baby, pain well controlled with PO ibuprofen & acetaminophen. Bleeding WNL, all other VSS. Bonding well with baby. Independent with cares.     Problem: Adult Inpatient Plan of Care  Goal: Plan of Care Review  Description: The Plan of Care Review/Shift note should be completed every shift.  The Outcome Evaluation is a brief statement about your assessment that the patient is improving, declining, or no change.  This information will be displayed automatically on your shift  note.  Outcome: Progressing  Flowsheets (Taken 6/28/2024 0356)  Plan of Care Reviewed With:   patient   spouse  Overall Patient Progress: improving  Goal: Patient-Specific Goal (Individualized)  Description: You can add care plan individualizations to a care plan. Examples of Individualization might be:  \"Parent requests to be called daily at 9am for status\", \"I have a hard time hearing out of my right ear\", or \"Do not touch me to wake me up as it startles  me\".  Outcome: Progressing  Goal: Absence of Hospital-Acquired Illness or Injury  Outcome: Progressing  Goal: Optimal Comfort and Wellbeing  Outcome: Progressing  Intervention: Monitor Pain and Promote Comfort  Recent Flowsheet Documentation  Taken 6/28/2024 0304 by Dayana Damian, RN  Pain Management Interventions: medication (see MAR)  Intervention: Provide Person-Centered Care  Recent Flowsheet Documentation  Taken 6/28/2024 0044 by Dayana Damian, RN  Trust Relationship/Rapport:   care explained   choices provided   emotional support provided   empathic listening provided   questions encouraged   questions answered   reassurance provided   thoughts/feelings acknowledged  Goal: Readiness for Transition of Care  Outcome: Progressing     Problem: Postpartum (Vaginal Delivery)  Goal: Successful Parent Role Transition  Outcome: Progressing  Goal: " Hemostasis  Outcome: Progressing  Goal: Absence of Infection Signs and Symptoms  Outcome: Progressing  Goal: Anesthesia/Sedation Recovery  Outcome: Progressing  Goal: Optimal Pain Control and Function  Outcome: Progressing  Intervention: Prevent or Manage Pain  Recent Flowsheet Documentation  Taken 6/28/2024 0304 by Dayana Damian, RN  Pain Management Interventions: medication (see MAR)  Goal: Effective Urinary Elimination  Outcome: Progressing

## 2024-06-28 NOTE — DISCHARGE SUMMARY
[facility]   Discharge Summary    Belkys Spears YOB: 1990   MRN [id number] Primary OB: [facility]     Date of Admission: 6/27/2024   Date of Admission: 6/30/2024   Admitting Physician: Ruthy Ricks MD   Discharge Physician:  Jean Carlos PEREZ      Admission Diagnoses   - Intrauterine pregnancy at 39w0d   - Hx gastric band  - Advanced maternal age  - Spontaneous labor     Discharge Diagnoses   - Same, now delivered      Procedures   Vaginal delivery on 6/28 at 39w0d  Epidural placement    Medications Prior to Admission     Medications Prior to Admission   Medication Sig Dispense Refill Last Dose    [DISCONTINUED] benzocaine (AMERICAINE) 20 % external aerosol Apply to perineum four times daily as needed for pain 57 g 0     [DISCONTINUED] docusate sodium (COLACE) 100 MG capsule Take 1 capsule (100 mg) by mouth daily 30 capsule 0 More than a month    [DISCONTINUED] hydrocortisone 2.5 % cream Apply topically daily as needed for other (Rosecea to ears)   More than a month    [DISCONTINUED] hydrocortisone, Perianal, (ANUSOL-HC) 2.5 % cream Place rectally 3 times daily as needed for hemorrhoids 30 g 0     [DISCONTINUED] ibuprofen (ADVIL/MOTRIN) 800 MG tablet Take 1 tablet (800 mg) by mouth every 6 hours as needed for moderate pain (cramping) 40 tablet 1     [DISCONTINUED] lanolin ointment Apply topically every hour as needed for other (sore nipples) 7 g 3       Discharge Medications     Current Discharge Medication List        START taking these medications    Details   acetaminophen (TYLENOL) 325 MG tablet Take 2 tablets (650 mg) by mouth every 4 hours as needed for mild pain  Qty: 40 tablet, Refills: 1    Associated Diagnoses: Indication for care or intervention related to labor and delivery           CONTINUE these medications which have CHANGED    Details   hydrocortisone, Perianal, (ANUSOL-HC) 2.5 % cream Place rectally 3 times daily as needed for hemorrhoids  Qty: 30 g, Refills: 0     Associated Diagnoses: Indication for care or intervention related to labor and delivery      ibuprofen (ADVIL/MOTRIN) 800 MG tablet Take 1 tablet (800 mg) by mouth every 6 hours as needed for other (cramping)  Qty: 40 tablet, Refills: 1    Associated Diagnoses: Indication for care or intervention related to labor and delivery      lanolin ointment Apply topically every hour as needed for other (sore nipples)  Qty: 7 g, Refills: 3    Associated Diagnoses: Indication for care or intervention related to labor and delivery           STOP taking these medications       benzocaine (AMERICAINE) 20 % external aerosol Comments:   Reason for Stopping:         docusate sodium (COLACE) 100 MG capsule Comments:   Reason for Stopping:         hydrocortisone 2.5 % cream Comments:   Reason for Stopping:              Brief Admission History     From the admit note of Dr. Ricks:   Belkys Spears is a 34 year old  at 39w0d by LMP admitted for spontaneous labor.    #. Spontaneous Labor:  - Admit for expectant management. Anticipate VD  - Pain control per patient request. General diet, up ad mateo.   - PPH Risk: average, no meds contraindicated    #. Fetal Well Being:   - Cephalic, EFW 7#  - GBS positive  - Continuous external fetal monitoring    #. Prenatal Care:   - A+, Rubella immune, HIV neg, Hep B neg, RPR neg  - Genetics: NIPS XY, AFP negative .   - Anatomy ultrasound: normal L2 24.   - 28 week labs: GCT 1h 146/2h passed, HGB 12.3, Treponema neg  - S/p COVID, Tdap  - GBS: positive   - Liberty feeding plan: plans to breast feed and has pump already  - Contraception: condoms and then possibly vasectomy    #. Hx gastric band:  - Bariatric labs at intake with a few exceptions that are slightly out of range (B12, B1)  - Plan to see bariatrics after delivery.     #. AMA:   - Detailed level 2 at 20 weeks was normal  - Genetic screening: NIPS negative  - Growth at 32w (46%, 7#7 at 39 weeks)    #. Disposition: inpatient      Intrapartum Course     From the delivery note of Dr. Ricks:     DELIVERY DATE: 24   DELIVERY TIME: 54    FINDINGS   1. Viable male infant at 39w0d.  2. Weight 2740g.  3. APGARs 8 and 9 at 1 and 5 minutes, respectively.   4. Intact placenta with 3-vessel cord.   5. Intact perineum.    6. Cord Gases: not collected, infant vigorous at birth.     DELIVERY DETAILS  Belkys Spears is a 34 year old  who was admitted at 39w0d for spontaneous labor. Pregnancy was complicated by advanced maternal age and history of gastric band procedure. Cervical exam on admission was 9cm. She received an epidural for analgesia. Shortly after epidural placement spontaneous rupture of membranes occurred. A few minutes later she felt increasing pressure and urge to push and was C/C/+2.     She pushed well for 10 minutes to deliver a viable male infant. Head delivered in direct OA position, restituted to LOT and delivered without difficulty. Right anterior shoulder delivered first, followed by left posterior shoulder without complication, followed by the body. Delayed cord clamping was performed for 60 seconds. The cord was clamped x2, cut, and the baby was then placed on the maternal abdomen.     The third stage of labor was managed actively. IV Pitocin was administered. The placenta delivered with gentle traction. Inspection revealed an intact perineum. At the end of the delivery, sponge and needle counts were correct. The infant was assessed per unit protocol.      EBL: 100cc.    COMPLICATIONS: none     Postpartum Course     The patient's hospital course was unremarkable. She received routine postpartum care and recovered without complication. On PPD#2, her pain was well controlled with PO medications and lochia was stable. She was ambulating, voiding without difficulty, and tolerating a general diet. Breastfeeding and supplementing with formula well. Infant was stable. She plans to use IUD and vasectomy for contraception.  She was discharged to home in stable condition.     Postpartum Hemoglobin:   Hemoglobin   Date Value Ref Range Status   06/28/2024 12.9 11.7 - 15.7 g/dL Final   04/14/2018 9.4 (L) 11.7 - 15.7 g/dL Final     Rh Status: positive, Rhogam is not indicated.   Rubella Status: immune, MMR is not indicated.     Discharge Instructions & Follow Up     Discharge Diet Regular   Discharge Activity Pelvic rest for 6 weeks including no sexual intercourse, tampons, or douching.     Discharge Follow Up Follow up with primary OB for routine postpartum visit in 6 weeks     Discharge Disposition   Home    Chan MD [facility]

## 2024-06-28 NOTE — H&P
[facility]   Labor & Delivery H&P    Belkys Spears YOB: 1990   MRN [id number] Primary OB: [facility]     HPI   Belkys Spears is a 34 year old  at 39w0d by LMP presenting in spontaneous labor. Was 4cm in clinic today and membranes swept. Contractions started to  at around 10pm.     PREGNANCY HISTORY   OB PROBLEM LIST  #. Advanced maternal age  #. Hx gastric band    OB History    Para Term  AB Living   2 1 0 0 0 1   SAB IAB Ectopic Multiple Live Births   0 0 0 0 1      # Outcome Date GA Lbr Scott/2nd Weight Sex Type Anes PTL Lv   2 Current            1 Para /  / 01:06 3.8 kg (8 lb 6 oz) F Vag-Spont EPI N EDUAR      Name: KAREN BALL      Apgar1: 8  Apgar5: 9     MATERNAL MEDICAL HISTORY     Past Medical History:   Diagnosis Date    Gastroesophageal reflux disease      Past Surgical History:   Procedure Laterality Date    ANKLE SURGERY  [id number]    Right    BARIATRIC SURGERY      GI SURGERY      Laparoscopic adjustable gastric banding     LAPAROSCOPIC REMOVAL GASTRIC ADJUSTABLE BAND N/A 2018    Procedure: LAPAROSCOPIC REMOVAL GASTRIC ADJUSTABLE BAND;  LAPAROSCOPIC REMOVAL AND REPLACEMENT GASTRIC BAND DEVICE AND PORT ;  Surgeon: Larry Shearer MD;  Location: [facility] OR    ORTHOPEDIC SURGERY      Right ankle surgery      Family History   Problem Relation Age of Onset    Breast Cancer Paternal Grandfather      Social History     Tobacco Use    Smoking status: Never    Smokeless tobacco: Never   Substance Use Topics    Alcohol use: Yes     Comment: Socially    Drug use: No     Medications Prior to Admission   Medication Sig Dispense Refill Last Dose    docusate sodium (COLACE) 100 MG capsule Take 1 capsule (100 mg) by mouth daily 30 capsule 0 More than a month    hydrocortisone 2.5 % cream Apply topically daily as needed for other (Rosecea to ears)   More than a month    benzocaine (AMERICAINE) 20 % external aerosol Apply to  perineum four times daily as needed for pain 57 g 0     hydrocortisone, Perianal, (ANUSOL-HC) 2.5 % cream Place rectally 3 times daily as needed for hemorrhoids 30 g 0     ibuprofen (ADVIL/MOTRIN) 800 MG tablet Take 1 tablet (800 mg) by mouth every 6 hours as needed for moderate pain (cramping) 40 tablet 1     lanolin ointment Apply topically every hour as needed for other (sore nipples) 7 g 3      No Known Allergies   OBJECTIVE     Vitals:    24 0046 24 0048 24 0051 24 0056   BP: 137/66 (!) 140/72 (!) 161/74 (!) 161/74   BP Location:       Patient Position:       Cuff Size:       Resp:       Temp:    98.2  F (36.8  C)   TempSrc:    Oral     Physical Exam  General: Alert, in no acute distress, resting comfortably in bed.   Neuro: Grossly normal to observation.  Psych: Alert, oriented, affect appropriate.  Cardiovascular: Normal rate, wwp.   Respiratory: Nonlabored breathing, equal chest rise/fall bilaterally.   Abdomen: Gravid.  Skin: Color, texture, turgor normal. No concerning rashes or lesions.    Cervix: /0    Fetal Monitoring  Difficult to monitor with patient uncomfortable/getting epidural   FHR 130s, moderate variability    ASSESSMENT & PLAN   Belkys Spears is a 34 year old  at 39w0d by LMP admitted for spontaneous labor.    #. Spontaneous Labor:  - Admit for expectant management. Anticipate VD  - Pain control per patient request. General diet, up ad mateo.   - PPH Risk: average, no meds contraindicated    #. Fetal Well Being:   - Cephalic, EFW 7#  - GBS positive  - Continuous external fetal monitoring    #. Prenatal Care:   - A+, Rubella immune, HIV neg, Hep B neg, RPR neg  - Genetics: NIPS XY, AFP negative .   - Anatomy ultrasound: normal L2 24.   - 28 week labs: GCT 1h 146/2h passed, HGB 12.3, Treponema neg  - S/p COVID, Tdap  - GBS: positive   -  feeding plan: plans to breast feed and has pump already  - Contraception: condoms and then possibly  vasectomy    #. Hx gastric band:  - Bariatric labs at intake with a few exceptions that are slightly out of range (B12, B1)  - Plan to see bariatrics after delivery.     #. AMA:   - Detailed level 2 at 20 weeks was normal  - Genetic screening: NIPS negative  - Growth at 32w (46%, 7#7 at 39 weeks)    #. Disposition: inpatient    Ruthy Ricks MD  [facility]  [phone]  06/28/2024 1:41 AM

## 2024-06-28 NOTE — PLAN OF CARE
Vital signs stable. Fundus is firm and midline, scant lochia. Due to void one more time per protocol. Ambulating independently, denies dizziness. Reports pain is manageable with tylenol and ibuprofen. Breast and bottle feeding every 2-3 hours, latch observed. Attentive to  cues.     Problem: Adult Inpatient Plan of Care  Goal: Plan of Care Review  Outcome: Progressing  Flowsheets (Taken 2024 07)  Plan of Care Reviewed With:   patient   spouse  Overall Patient Progress: improving  Goal: Patient-Specific Goal (Individualized)  Outcome: Progressing  Goal: Absence of Hospital-Acquired Illness or Injury  Outcome: Progressing  Intervention: Prevent Skin Injury  Recent Flowsheet Documentation  Taken 2024 by Dafne Rios RN  Body Position: position changed independently  Intervention: Prevent Infection  Recent Flowsheet Documentation  Taken 2024 by Dafne Rios RN  Infection Prevention:   rest/sleep promoted   hand hygiene promoted  Goal: Optimal Comfort and Wellbeing  Outcome: Progressing  Intervention: Provide Person-Centered Care  Recent Flowsheet Documentation  Taken 2024 by Dafne Rios RN  Trust Relationship/Rapport:   care explained   choices provided   questions answered   thoughts/feelings acknowledged  Goal: Readiness for Transition of Care  Outcome: Progressing     Problem: Postpartum (Vaginal Delivery)  Goal: Successful Parent Role Transition  Outcome: Progressing  Goal: Hemostasis  Outcome: Progressing  Goal: Absence of Infection Signs and Symptoms  Outcome: Progressing  Goal: Anesthesia/Sedation Recovery  Outcome: Progressing  Goal: Optimal Pain Control and Function  Outcome: Progressing  Goal: Effective Urinary Elimination  Outcome: Progressing   Goal Outcome Evaluation:      Plan of Care Reviewed With: patient, spouse    Overall Patient Progress: improvingOverall Patient Progress: improving

## 2024-06-28 NOTE — CARE PLAN
Data: Belkys Spears transferred to 444 via wheelchair at 0330. Baby transferred via parent's arms.  Action: Receiving unit notified of transfer: Yes. Patient and family notified of room change. Report given to VIJAYA Leos at 0335. Belongings sent to receiving unit. Accompanied by Registered Nurse. Oriented patient to surroundings. Call light within reach. ID bands double-checked with receiving RN.  Response: Patient tolerated transfer and is stable.    Patients mobililty level scored using the bedside mobility assistance tool (BMAT). Patient is at a mobility level test number: 4. Mobility equipment used: none required. Required assist of 0 staff members. Further use of BMAT scoring not required.

## 2024-06-28 NOTE — LACTATION NOTE
Lactation visit with patient. Second baby for family. Patient pumped and fed EBM due to tongue tie and high weight loss. Plan to breastfeed this baby. Baby sga with a low sugar with needing gel. Started supplementing with formula to stabilize. Started patient pumping, encouraged hand expression. Will watch video, but feels comfortable with hand expression. Writer asked patient to call with a feed. Reviewed breastfeeding education, questions answered.

## 2024-06-28 NOTE — PROGRESS NOTES
"[facility] Postpartum Note    S:  Belkys Spears feels well this morning. Was able to sleep last night. Pain control adequate. Lochia minimal. Voiding. Breastfeeding. Mood Good.     O:  Vitals were reviewed  Blood pressure 139/86, pulse 83, temperature 98.6  F (37  C), temperature source Oral, resp. rate 15, SpO2 94%, unknown if currently breastfeeding.    General: healthy, alert, and no distress  Abd: soft, appropriately tender, fundus firm  Legs: Non-tender, 0+ pitting edema    No results found for: \"RH\"  Rubella: immune    Assessment and Plan:   Postpartum Day #0, status post vaginal delivery, doing well.  -- Routine care  -- F/U 6 weeks w/ Primary OB  -- Discharge meds: see med rec  -- Contraception: discuss at postpartum    #. Hx gastric band:  - Bariatric labs at intake with a few exceptions that are slightly out of range (B12, B1)  - Plan to see bariatrics after delivery.      #. AMA:   - Detailed level 2 at 20 weeks was normal  - Genetic screening: NIPS negative  - Growth at 32w (46%, 7#7 at 39 weeks)    Nai Felix MD    "

## 2024-06-28 NOTE — L&D DELIVERY NOTE
[facility]   Vaginal Delivery Note    Belkys Spears YOB: 1990   MRN [id number] Primary OB: [facility]     DELIVERY DATE: 24   DELIVERY TIME: 54    FINDINGS   1. Viable male infant at 39w0d.  2. Weight 2740g.  3. APGARs 8 and 9 at 1 and 5 minutes, respectively.   4. Intact placenta with 3-vessel cord.   5. Intact perineum.    6. Cord Gases: not collected, infant vigorous at birth.     DELIVERY DETAILS  Belkys Spears is a 34 year old  who was admitted at 39w0d for spontaneous labor. Pregnancy was complicated by advanced maternal age and history of gastric band procedure. Cervical exam on admission was 9cm. She received an epidural for analgesia. Shortly after epidural placement spontaneous rupture of membranes occurred. A few minutes later she felt increasing pressure and urge to push and was C/C/+2.     She pushed well for 10 minutes to deliver a viable male infant. Head delivered in direct OA position, restituted to LOT and delivered without difficulty. Right anterior shoulder delivered first, followed by left posterior shoulder without complication, followed by the body. Delayed cord clamping was performed for 60 seconds. The cord was clamped x2, cut, and the baby was then placed on the maternal abdomen.     The third stage of labor was managed actively. IV Pitocin was administered. The placenta delivered with gentle traction. Inspection revealed an intact perineum. At the end of the delivery, sponge and needle counts were correct. The infant was assessed per unit protocol.      EBL: 100cc.    COMPLICATIONS: none    Lauren MacNeill, MD [facility]  [phone]  2024 1:36 AM    DELIVERY SUMMARY  Devin Spears [[id number]]      Labor Event Times      Dilation complete date: 24 Complete time: 12:43 AM   Start pushing date/time: 2024 0043          Labor Events     labor?: No   steroids: None  Labor Type:  "Spontaneous  Predominate monitoring during 1st stage: continuous electronic fetal monitoring     Antibiotics received during labor?: No       Rupture date/time: 24 0031   Rupture type: Spontaneous Rupture of Membranes  Fluid color: Clear, Bloody  Fluid odor: Normal       Delivery/Placenta Date and Time      Delivery Date: 24 Delivery Time: 12:54 AM   Oxytocin given at the time of delivery: after delivery of baby  Delivering clinician: Ruthy Ricks MD   Other personnel present at delivery:  Provider Role   Dayana Damian RN Blouin, Helen, RN              Vaginal Counts       Initial count performed by 2 team members:  Two Team Members   dayana ricks         Needles Suture Needles Sponges (RETIRED) Instruments   Initial counts 2  5    Added to count       Relief counts       Final counts 2  5            Placed during labor Accounted for at the end of labor   FSE No    IUPC No    Cervidil No                   Final count performed by 2 team members:  Two Team Members   dayana ricks      Final count correct?: Yes       Apgars    Living status: Living   1 Minute 5 Minute 10 Minute 15 Minute 20 Minute   Skin color: 1  1       Heart rate: 2  2       Reflex irritability: 2  2       Muscle tone: 2  2       Respiratory effort: 1  2       Total: 8  9       Apgars assigned by: DAYANA DAMIAN       Cord      Vessels: 3 Vessels    Cord Complications: None               Cord Blood Disposition: Discard    Gases Sent?: No    Delayed cord clamping?: Yes    Cord Clamping Delay (seconds):  seconds    Stem cell collection?: No           Mobile Resuscitation    Methods: None        Measurements      Weight: 6 lb 0.7 oz Length: 1' 8\"     Head circumference: 33 cm           Skin to Skin and Feeding Plan      Skin to skin initiation date/time: 1841    Skin to skin with: Mother  Skin to skin end date/time:            Labor Events and Shoulder Dystocia    Fetal Tracing Prior to " Delivery: Category 2  Shoulder dystocia present?: Neg       Delivery (Maternal) (Provider to Complete) (524973)    Episiotomy: None  Perineal lacerations: None    Repair suture: None  Genital tract inspection done: Pos       Blood Loss  Mother: Belkys Spears #[id number]     Start of Mother's Information      Delivery Blood Loss  06/27/24 1254 - 06/28/24 0136      Delivery QBL (mL) Hospital Encounter 100 mL    Total  100 mL               End of Mother's Information  Mother: Belkys Spears #[id number]                Delivery - Provider to Complete (624785)    Delivering clinician: Ruthy Ricks MD  Delivery Type (Choose the 1 that will go to the Birth History): Vaginal, Spontaneous                         Other personnel:  Provider Role   Dayana Damian RN Blouin, Helen, RN                     Placenta    Removal: Expressed  Disposition: Hospital disposal             Anesthesia    Method: Epidural  Cervical dilation at placement: 8-10                    Presentation and Position    Presentation: Vertex    Position: Middle Occiput Anterior

## 2024-06-28 NOTE — PROGRESS NOTES
Pt tolerating labor very well. 9cm upon arrival, admitted to room 412, epidural promptly given.     Report given to Dayana LILLY.

## 2024-06-28 NOTE — ANESTHESIA PREPROCEDURE EVALUATION
Anesthesia Pre-Procedure Evaluation    Patient: Belkys Spears   MRN: [id number] : 1990        Procedure :           Past Medical History:   Diagnosis Date    Gastroesophageal reflux disease       Past Surgical History:   Procedure Laterality Date    ANKLE SURGERY  [id number]    Right    BARIATRIC SURGERY      GI SURGERY      Laparoscopic adjustable gastric banding     LAPAROSCOPIC REMOVAL GASTRIC ADJUSTABLE BAND N/A 2018    Procedure: LAPAROSCOPIC REMOVAL GASTRIC ADJUSTABLE BAND;  LAPAROSCOPIC REMOVAL AND REPLACEMENT GASTRIC BAND DEVICE AND PORT ;  Surgeon: Larry Shearer MD;  Location: [facility] OR    ORTHOPEDIC SURGERY      Right ankle surgery       No Known Allergies   Social History     Tobacco Use    Smoking status: Never    Smokeless tobacco: Never   Substance Use Topics    Alcohol use: Yes     Comment: Socially      Wt Readings from Last 1 Encounters:   10/17/22 72.6 kg (160 lb)        Anesthesia Evaluation        No history of anesthetic complications       ROS/MED HX  ENT/Pulmonary:  - neg pulmonary ROS     Neurologic:  - neg neurologic ROS     Cardiovascular:  - neg cardiovascular ROS     METS/Exercise Tolerance:     Hematologic:  - neg hematologic  ROS     Musculoskeletal:       GI/Hepatic:     (+) GERD,                   Renal/Genitourinary:       Endo:  - neg endo ROS     Psychiatric/Substance Use:  - neg psychiatric ROS     Infectious Disease:       Malignancy:       Other:     (-) previous  and TOLAC candidate       Physical Exam    Airway        Mallampati: II   TM distance: > 3 FB   Neck ROM: full   Mouth opening: > 3 cm    Respiratory Devices and Support         Dental  no notable dental history         Cardiovascular   cardiovascular exam normal          Pulmonary   pulmonary exam normal                OUTSIDE LABS:  CBC:   Lab Results   Component Value Date    WBC 17.5 (H) 2022    HGB 12.9 2024    HGB 12.5 2022    HCT 37.8 2022      "08/05/2022     04/13/2018     BMP:   Lab Results   Component Value Date     04/14/2018    POTASSIUM 3.3 (L) 04/14/2018    CHLORIDE 108 04/14/2018    CO2 24 04/14/2018    CR 0.63 04/14/2018    CR 0.83 04/13/2018     COAGS: No results found for: \"PTT\", \"INR\", \"FIBR\"  POC:   Lab Results   Component Value Date    BGM 77 04/14/2018    HCG Negative 04/13/2018     HEPATIC: No results found for: \"ALBUMIN\", \"PROTTOTAL\", \"ALT\", \"AST\", \"GGT\", \"ALKPHOS\", \"BILITOTAL\", \"BILIDIRECT\", \"PAVEL\"  OTHER: No results found for: \"PH\", \"LACT\", \"A1C\", \"JOSE F\", \"PHOS\", \"MAG\", \"LIPASE\", \"AMYLASE\", \"TSH\", \"T4\", \"T3\", \"CRP\", \"SED\"    Anesthesia Plan    ASA Status:  2       Anesthesia Type: Epidural.              Consents    Anesthesia Plan(s) and associated risks, benefits, and realistic alternatives discussed. Questions answered and patient/representative(s) expressed understanding.     - Discussed:     - Discussed with:  Patient            Postoperative Care            Comments:           neg OB ROS.      Juan Vernon MD    I have reviewed the pertinent notes and labs in the chart from the past 30 days and (re)examined the patient.  Any updates or changes from those notes are reflected in this note.                  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 The patient has no other symptoms, including shortness of breath, fever, loss of sense of taste or smell or ough

## 2024-06-28 NOTE — PROGRESS NOTES
Data: Patient admitted to room 412 at 0008. Patient is a . Prenatal record reviewed.   OB History    Para Term  AB Living   2 1 0 0 0 1   SAB IAB Ectopic Multiple Live Births   0 0 0 0 1      # Outcome Date GA Lbr Scott/2nd Weight Sex Type Anes PTL Lv   2 Current            1 Para /  / 01:06 3.8 kg (8 lb 6 oz) F Vag-Spont EPI N EDUAR      Name: LIZZY,FEMALE-DIANE      Apgar1: 8  Apgar5: 9   .  Medical History:   Past Medical History:   Diagnosis Date    Gastroesophageal reflux disease    .  Gestational age 39w0d. Vital signs per doc flowsheet. Fetal movement present. Patient reports Laboring   as reason for admission. Support persons  Merritt present.  Action: Care of patient assumed at time of arrival. Verbal consent for EFM, external fetal monitors applied. Admission assessment completed. Patient and support persons educated on labor process. Patient instructed to report change in fetal movement, contractions, vaginal leaking of fluid or bleeding, abdominal pain, or any concerns related to the pregnancy to her nurse/physician. Patient oriented to room, call light in reach.   Response: Dr. Prince informed of pt arrival and active labor status. Plan per provider is imminent delivery with epidural - goal. Patient verbalized understanding of education and verbalized agreement with plan. Patient coping with labor via breathing, plan for epidural.

## 2024-06-28 NOTE — ANESTHESIA PROCEDURE NOTES
"Epidural catheter Procedure Note    Pre-Procedure   Staff -        Anesthesiologist:  Juan Vernon MD       Performed By: anesthesiologist       Referred By: Niki       Location: OB       Pre-Anesthestic Checklist: patient identified, IV checked, risks and benefits discussed, informed consent, monitors and equipment checked, pre-op evaluation, at physician/surgeon's request and post-op pain management  Timeout:       Correct Patient: Yes        Correct Procedure: Yes        Correct Site: Yes        Correct Position: Yes   Procedure Documentation  Procedure: epidural catheter       Patient Position: sitting       Patient Prep/Sterile Barriers: sterile gloves, mask, patient draped       Skin prep: Betadine       Local skin infiltrated with mL of 1% lidocaine.        Insertion Site: L2-3. (midline approach).       Technique: LORT saline        JESSY at 5 cm.       Needle Type: Vuzey needle       Needle Gauge: 17.        Needle Length (Inches): 3.5        Catheter: 19 G.          Catheter threaded easily.         7 cm epidural space.         Threaded 12 cm at skin.         # of attempts: 1 and  # of redirects:     Assessment/Narrative         Paresthesias: No.       Test dose of 3 mL lidocaine 1.5% w/ 1:200,000 epinephrine at 00:24 CDT.         Test dose negative, 3 minutes after injection, for signs of intravascular, subdural, or intrathecal injection.       Insertion/Infusion Method: LORT saline       Aspiration negative for Heme or CSF via Epidural Catheter.    Medication(s) Administered   0.25% Bupivacaine PF (Epidural) - EPIDURAL   10 mL - 6/28/2024 12:27:00 AM    FOR [facility] ([facility]) ONLY:   Pain Team Contact information: please page the Pain Team [address]. Search \"Pain\". During daytime hours, please page the attending first. At night please page the resident first.      "

## 2024-06-29 PROCEDURE — 250N000013 HC RX MED GY IP 250 OP 250 PS 637: Performed by: STUDENT IN AN ORGANIZED HEALTH CARE EDUCATION/TRAINING PROGRAM

## 2024-06-29 PROCEDURE — 999N000079 HC STATISTIC IP LACTATION SERVICES 1-15 MIN

## 2024-06-29 PROCEDURE — 120N000001 HC R&B MED SURG/OB

## 2024-06-29 RX ADMIN — DOCUSATE SODIUM 100 MG: 100 CAPSULE, LIQUID FILLED ORAL at 08:59

## 2024-06-29 RX ADMIN — ACETAMINOPHEN 650 MG: 325 TABLET, FILM COATED ORAL at 01:34

## 2024-06-29 RX ADMIN — IBUPROFEN 800 MG: 800 TABLET, FILM COATED ORAL at 01:34

## 2024-06-29 RX ADMIN — IBUPROFEN 800 MG: 800 TABLET, FILM COATED ORAL at 13:12

## 2024-06-29 ASSESSMENT — ACTIVITIES OF DAILY LIVING (ADL)
ADLS_ACUITY_SCORE: 18

## 2024-06-29 NOTE — ANESTHESIA POSTPROCEDURE EVALUATION
Patient: Belkys Spears    Procedure: * No procedures listed *       Anesthesia Type:  Epidural    Note:     Postop Pain Control:    PONV:    Neuro/Psych:    Airway/Respiratory:    CV/Hemodynamics:    Other NRE:    DID A NON-ROUTINE EVENT OCCUR?     Event details/Postop Comments:      S/P epidural for labor.   I or my partner was immediately available for management of this patient during epidural analgesia infusion.  VSS.  Doing well. Block resolved.  Neuro at baseline. Denies positional headache. Minimal side effects easily managed w/ PRN meds. No apparent anesthetic complications. No follow-up required.    Shimon Clinton MD             Last vitals:  Vitals:    06/29/24 0131 06/29/24 0732 06/29/24 0902   BP: 119/76 121/77 138/83   Pulse: 74 78 65   Resp: 16 16 17   Temp: 98.4  F (36.9  C) 98  F (36.7  C) 97.7  F (36.5  C)   SpO2:          Electronically Signed By: Shimon Clinton MD  June 29, 2024  3:52 PM

## 2024-06-29 NOTE — PLAN OF CARE
"Pt took a shower earlier today, breast feeding and was pumping in the afternoon, continues to supplement infant with formula; working on discharge papers. Pt was given ibuprofen for pain control, did not want tylenol or non-pharmacological interventions at shift.  Problem: Adult Inpatient Plan of Care  Goal: Plan of Care Review  Description: The Plan of Care Review/Shift note should be completed every shift.  The Outcome Evaluation is a brief statement about your assessment that the patient is improving, declining, or no change.  This information will be displayed automatically on your shift  note.  Outcome: Progressing  Flowsheets (Taken 6/29/2024 1443)  Plan of Care Reviewed With:   patient   spouse  Goal: Patient-Specific Goal (Individualized)  Description: You can add care plan individualizations to a care plan. Examples of Individualization might be:  \"Parent requests to be called daily at 9am for status\", \"I have a hard time hearing out of my right ear\", or \"Do not touch me to wake me up as it startles  me\".  Outcome: Progressing  Goal: Absence of Hospital-Acquired Illness or Injury  Outcome: Progressing  Intervention: Prevent Skin Injury  Recent Flowsheet Documentation  Taken 6/29/2024 0904 by Marya Peters RN  Body Position: position changed independently  Goal: Optimal Comfort and Wellbeing  Outcome: Progressing  Intervention: Monitor Pain and Promote Comfort  Recent Flowsheet Documentation  Taken 6/29/2024 1415 by Marya Peters RN  Pain Management Interventions: declines  Taken 6/29/2024 1312 by Marya Peters RN  Pain Management Interventions: medication (see MAR)  Taken 6/29/2024 0902 by Marya Peters RN  Pain Management Interventions: declines  Intervention: Provide Person-Centered Care  Recent Flowsheet Documentation  Taken 6/29/2024 0904 by Marya Peters RN  Trust Relationship/Rapport:   care explained   choices provided   emotional support provided   " empathic listening provided   questions answered   questions encouraged   reassurance provided   thoughts/feelings acknowledged  Goal: Readiness for Transition of Care  Outcome: Progressing     Problem: Postpartum (Vaginal Delivery)  Goal: Successful Parent Role Transition  Outcome: Progressing  Intervention: Support Parent Role Transition  Recent Flowsheet Documentation  Taken 6/29/2024 0904 by Marya Peters RN  Supportive Measures:   active listening utilized   decision-making supported  Parent-Child Attachment Promotion:   caring behavior modeled   rooming-in promoted  Goal: Hemostasis  Outcome: Progressing  Goal: Absence of Infection Signs and Symptoms  Outcome: Progressing  Goal: Anesthesia/Sedation Recovery  Outcome: Progressing  Goal: Optimal Pain Control and Function  Outcome: Progressing  Intervention: Prevent or Manage Pain  Recent Flowsheet Documentation  Taken 6/29/2024 1415 by Marya Peters RN  Pain Management Interventions: declines  Taken 6/29/2024 1312 by Marya Peters RN  Pain Management Interventions: medication (see MAR)  Taken 6/29/2024 0902 by Marya Peters RN  Pain Management Interventions: declines  Goal: Effective Urinary Elimination  Outcome: Progressing     Problem: Pain Acute  Goal: Optimal Pain Control and Function  Outcome: Progressing  Intervention: Develop Pain Management Plan  Recent Flowsheet Documentation  Taken 6/29/2024 1415 by Marya Peters RN  Pain Management Interventions: declines  Taken 6/29/2024 1312 by Marya Peters RN  Pain Management Interventions: medication (see MAR)  Taken 6/29/2024 0902 by Marya Peters RN  Pain Management Interventions: declines  Intervention: Optimize Psychosocial Wellbeing  Recent Flowsheet Documentation  Taken 6/29/2024 0904 by Marya Peters RN  Supportive Measures:   active listening utilized   decision-making supported     Problem: Fall Injury Risk  Goal: Absence of Fall and  Fall-Related Injury  Outcome: Progressing   Goal Outcome Evaluation:      Plan of Care Reviewed With: patient, spouse

## 2024-06-29 NOTE — PROGRESS NOTES
"[facility] Postpartum Note    S:  Belkys Spears feels well this morning. Was able to sleep last night. Pain control adequate. Lochia minimal. Voiding. Breast feeding. Mood Good.     O:  Vitals were reviewed  Blood pressure 121/77, pulse 78, temperature 98  F (36.7  C), temperature source Oral, resp. rate 16, SpO2 94%, unknown if currently breastfeeding.    General: healthy, alert, and no distress  Abd: soft, appropriately tender, fundus firm  Legs: Non-tender, 0+ pitting edema    No results found for: \"RH\"  Rubella: immune    Assessment and Plan:   Postpartum Day #1, status post vaginal delivery, doing well.  -- Routine care  -- F/U 6 weeks w/ Primary OB  -- Discharge meds: see med rec  -- Contraception: discuss at postpartum     #. Hx gastric band:  - Bariatric labs at intake with a few exceptions that are slightly out of range (B12, B1)  - Plan to see bariatrics after delivery.      #. AMA:   - Detailed level 2 at 20 weeks was normal  - Genetic screening: NIPS negative  - Growth at 32w (46%, 7#7 at 39 weeks)     Nai Felix MD    "

## 2024-06-29 NOTE — LACTATION NOTE
Lactation in to follow up with Belkys and baby Ace. Baby latched at breast with good swallows heard. Patient states she is hearing more frequent swallows. Continues to supplement with formula, and pumping prn. Praise given. Will continue with feeding plan.

## 2024-06-29 NOTE — PLAN OF CARE
Vital signs stable. Postpartum checks WDL. Pt is up ad mateo, voiding w/o difficulties. Pt is independent in self cares. Pt is breast and formula feeding every 2-3 hrs, tolerating well. Pt is pumping and will feed any expressed milk. Pain well controlled with Tylenol and Ibuprofen. Pt stated increased comfort after each medication. Pt is attentive to all  cues and cares. Positive bonding observed. FOB at bedside, supportive of pt.        Problem: Adult Inpatient Plan of Care  Goal: Plan of Care Review  Description: The Plan of Care Review/Shift note should be completed every shift.  The Outcome Evaluation is a brief statement about your assessment that the patient is improving, declining, or no change.  This information will be displayed automatically on your shift  note.  Outcome: Progressing  Flowsheets (Taken 2024 0322)  Plan of Care Reviewed With:   patient   spouse  Overall Patient Progress: improving  Goal: Absence of Hospital-Acquired Illness or Injury  Intervention: Prevent Skin Injury  Recent Flowsheet Documentation  Taken 2024 013 by Elizabeth Dillard RN  Body Position: position changed independently  Taken 2024 by Elizabeth Dillard RN  Body Position: position changed independently  Intervention: Prevent Infection  Recent Flowsheet Documentation  Taken 2024 013 by Elizabeth Dillard RN  Infection Prevention:   hand hygiene promoted   single patient room provided   rest/sleep promoted  Taken 2024 by Elizabeth Dillard RN  Infection Prevention:   hand hygiene promoted   single patient room provided   rest/sleep promoted  Goal: Optimal Comfort and Wellbeing  Intervention: Monitor Pain and Promote Comfort  Recent Flowsheet Documentation  Taken 2024 by Elizabeth Dillard RN  Pain Management Interventions: medication (see MAR)  Intervention: Provide Person-Centered Care  Recent Flowsheet Documentation  Taken 2024 013 by Elizabeth Dillard RN  Trust  Relationship/Rapport:   care explained   choices provided   emotional support provided   empathic listening provided   questions answered   questions encouraged   reassurance provided   thoughts/feelings acknowledged  Taken 6/28/2024 2040 by Elizabeth Dillard, RN  Trust Relationship/Rapport:   care explained   choices provided   emotional support provided   empathic listening provided   questions answered   questions encouraged   reassurance provided   thoughts/feelings acknowledged     Problem: Postpartum (Vaginal Delivery)  Goal: Optimal Pain Control and Function  Intervention: Prevent or Manage Pain  Recent Flowsheet Documentation  Taken 6/29/2024 0134 by Elizabeth Dillard, RN  Pain Management Interventions: medication (see MAR)  Taken 6/29/2024 0131 by Elizabeth Dillard, RN  Perineal Care:   perineal hygiene encouraged   perineal spray bottle/warm water use encouraged  Taken 6/28/2024 2040 by Elizabeth Dillard, RN  Perineal Care:   perineal hygiene encouraged   perineal spray bottle/warm water use encouraged     Problem: Pain Acute  Goal: Optimal Pain Control and Function  Intervention: Develop Pain Management Plan  Recent Flowsheet Documentation  Taken 6/29/2024 0134 by Elizabeth Dillard, RN  Pain Management Interventions: medication (see MAR)

## 2024-06-29 NOTE — PLAN OF CARE
"Goal Outcome Evaluation:      Plan of Care Reviewed With: patient    Overall Patient Progress: improvingOverall Patient Progress: improving    Outcome Evaluation: Pt progressing on care plan goals.    Vital signs stable.  Pain managed with Ibuprofen when needed.  Pt is breastfeeding baby and pumping.  Pt has some nipple tenderness, nipple cream at bedside.  Pt is ambulating on her own, voiding without difficulty and independent in cares for self. Positive interaction with baby noted.   at bedside and very supportive.           Problem: Adult Inpatient Plan of Care  Goal: Plan of Care Review  Description: The Plan of Care Review/Shift note should be completed every shift.  The Outcome Evaluation is a brief statement about your assessment that the patient is improving, declining, or no change.  This information will be displayed automatically on your shift  note.  6/29/2024 1822 by Susan Tellez RN  Outcome: Progressing  Flowsheets (Taken 6/29/2024 1822)  Outcome Evaluation: Pt progressing on care plan goals.  Plan of Care Reviewed With: patient  Overall Patient Progress: improving  6/29/2024 1821 by Susan Tellez RN  Outcome: Progressing  Goal: Patient-Specific Goal (Individualized)  Description: You can add care plan individualizations to a care plan. Examples of Individualization might be:  \"Parent requests to be called daily at 9am for status\", \"I have a hard time hearing out of my right ear\", or \"Do not touch me to wake me up as it startles  me\".  6/29/2024 1822 by Susan Tellez RN  Outcome: Progressing  6/29/2024 1821 by Susan Tellez RN  Outcome: Progressing  Goal: Absence of Hospital-Acquired Illness or Injury  6/29/2024 1822 by Susan Tellez RN  Outcome: Progressing  6/29/2024 1821 by Susan Tellez RN  Outcome: Progressing  Intervention: Prevent Skin Injury  Recent Flowsheet Documentation  Taken 6/29/2024 1700 by Susan Tellez RN  Body Position: position changed " independently  Intervention: Prevent Infection  Recent Flowsheet Documentation  Taken 6/29/2024 1700 by Susan Tellez RN  Infection Prevention: hand hygiene promoted  Goal: Optimal Comfort and Wellbeing  6/29/2024 1822 by Susan Tellez RN  Outcome: Progressing  6/29/2024 1821 by Susan Tellez RN  Outcome: Progressing  Intervention: Monitor Pain and Promote Comfort  Recent Flowsheet Documentation  Taken 6/29/2024 1700 by Susan Tellez RN  Pain Management Interventions: declines  Intervention: Provide Person-Centered Care  Recent Flowsheet Documentation  Taken 6/29/2024 1700 by Susan Tellez RN  Trust Relationship/Rapport:   care explained   choices provided   questions answered   questions encouraged  Goal: Readiness for Transition of Care  6/29/2024 1822 by Susan Tellez RN  Outcome: Progressing  6/29/2024 1821 by Susan Tellez RN  Outcome: Progressing

## 2024-06-30 VITALS
TEMPERATURE: 98.1 F | SYSTOLIC BLOOD PRESSURE: 135 MMHG | HEART RATE: 76 BPM | DIASTOLIC BLOOD PRESSURE: 82 MMHG | RESPIRATION RATE: 18 BRPM | OXYGEN SATURATION: 94 %

## 2024-06-30 PROCEDURE — 250N000013 HC RX MED GY IP 250 OP 250 PS 637: Performed by: STUDENT IN AN ORGANIZED HEALTH CARE EDUCATION/TRAINING PROGRAM

## 2024-06-30 PROCEDURE — 999N000079 HC STATISTIC IP LACTATION SERVICES 1-15 MIN

## 2024-06-30 RX ADMIN — DOCUSATE SODIUM 100 MG: 100 CAPSULE, LIQUID FILLED ORAL at 08:25

## 2024-06-30 ASSESSMENT — ACTIVITIES OF DAILY LIVING (ADL)
ADLS_ACUITY_SCORE: 18

## 2024-06-30 NOTE — PLAN OF CARE
"VSS, pt is on pathway, pumping large amounts of ebm up to 40 ml, supplemented baby with expressed milk this shift; discharge completed, follow up explained in 6 weeks with OB provider, has BR pump at home; discharging in stable condition with mother.  Problem: Adult Inpatient Plan of Care  Goal: Plan of Care Review  Description: The Plan of Care Review/Shift note should be completed every shift.  The Outcome Evaluation is a brief statement about your assessment that the patient is improving, declining, or no change.  This information will be displayed automatically on your shift  note.  Outcome: Met  Flowsheets (Taken 6/30/2024 1213)  Plan of Care Reviewed With:   spouse   patient  Goal: Patient-Specific Goal (Individualized)  Description: You can add care plan individualizations to a care plan. Examples of Individualization might be:  \"Parent requests to be called daily at 9am for status\", \"I have a hard time hearing out of my right ear\", or \"Do not touch me to wake me up as it startles  me\".  Outcome: Met  Goal: Absence of Hospital-Acquired Illness or Injury  Outcome: Met  Goal: Optimal Comfort and Wellbeing  Outcome: Met  Intervention: Provide Person-Centered Care  Recent Flowsheet Documentation  Taken 6/30/2024 0829 by Marya Peters RN  Trust Relationship/Rapport:   care explained   choices provided   empathic listening provided   emotional support provided   questions answered   questions encouraged   reassurance provided   thoughts/feelings acknowledged  Goal: Readiness for Transition of Care  Outcome: Met     Problem: Postpartum (Vaginal Delivery)  Goal: Successful Parent Role Transition  Outcome: Met  Intervention: Support Parent Role Transition  Recent Flowsheet Documentation  Taken 6/30/2024 0829 by Marya Peters RN  Supportive Measures:   active listening utilized   decision-making supported  Parent-Child Attachment Promotion:   caring behavior modeled   rooming-in promoted  Goal: " Hemostasis  Outcome: Met  Goal: Absence of Infection Signs and Symptoms  Outcome: Met  Goal: Anesthesia/Sedation Recovery  Outcome: Met  Goal: Optimal Pain Control and Function  Outcome: Met  Goal: Effective Urinary Elimination  Outcome: Met     Problem: Pain Acute  Goal: Optimal Pain Control and Function  Outcome: Met  Intervention: Optimize Psychosocial Wellbeing  Recent Flowsheet Documentation  Taken 6/30/2024 0829 by Marya Peters RN  Supportive Measures:   active listening utilized   decision-making supported     Problem: Fall Injury Risk  Goal: Absence of Fall and Fall-Related Injury  Outcome: Met   Goal Outcome Evaluation:      Plan of Care Reviewed With: spouse, patient

## 2024-06-30 NOTE — LACTATION NOTE
Lactation in to see patient with a feed. Breast starting to fill. Baby doing more swallowing at breast. Writer pulled down on chin to widen latch. Patient continues to pump to supplement infant. Will continue with plan with guidance from Peds. Home today.

## 2024-06-30 NOTE — PROGRESS NOTES
"Patient Name:  Belkys Spears   MRN:  [id number]  Age:  34 year old    YOB: 1990      POSTPARTUM PROGRESS NOTE    Pt is PPD#2 s/p vaginal delivery.  She is doing well without complaints.  Pt is ambulating, voiding, tolerating a regular diet.  Pain is well controlled and lochia is within normal limits.  She is breastfeeding and supplementing with formula.  Baby is doing well.    Objective:    Temp:  [97.7  F (36.5  C)-99  F (37.2  C)] 98.6  F (37  C)  Pulse:  [65-81] 80  Resp:  [16-17] 16  BP: (115-138)/(53-88) 115/53  0 lbs 0 oz    General Appearance:  NAD  Lungs:  unlabored  Cardiovascular:  RRR  Abdomen:  nontender, nondistended  Fundus:  firm, below the umbilicus      Lower extremities:  trace symmetric edema    Lab Review:    ABO/RH(D)   Date Value Ref Range Status   2024 A POS  Final     Hemoglobin   Date Value Ref Range Status   2024 12.9 11.7 - 15.7 g/dL Final   2022 12.5 11.7 - 15.7 g/dL Final   2018 9.4 (L) 11.7 - 15.7 g/dL Final     Hematocrit   Date Value Ref Range Status   2022 37.8 35.0 - 47.0 % Final       Lab Results   Component Value Date    WBC 17.5 2022     Lab Results   Component Value Date    RBC 4.27 2022     Lab Results   Component Value Date    HGB 12.9 2024    HGB 9.4 2018     Lab Results   Component Value Date    HCT 37.8 2022     No components found for: \"MCT\"  Lab Results   Component Value Date    MCV 89 2022     Lab Results   Component Value Date    MCH 29.3 2022     Lab Results   Component Value Date    MCHC 33.1 2022     Lab Results   Component Value Date    RDW 12.8 2022     Lab Results   Component Value Date     2022     2018       Assessment:  33yo  PPD#2 s/p vaginal delivery, doing well.    Plan:   - Postpartum: recovering well. Pain well controlled. Cont PO pain meds and regular diet. Encourage ambulation.  - h/o Gastric Band: f/unit(s) with Bariatric " Surg PP  - Contraception: IUD and possible vasectomy  - Dispo: anticipate DC today      Meredith Chan, MD [facility]  June 30, 2024

## 2024-06-30 NOTE — DISCHARGE INSTRUCTIONS
"Postpartum Care at Home With Your Baby: Care Instructions  Overview     After childbirth (postpartum period), your body goes through many changes as you recover. In these weeks after delivery, try to take good care of yourself. Get rest whenever you can and accept help from others.  It may take 4 to 6 weeks to feel like yourself again, and possibly longer if you had a  birth. You may feel sore or very tired as you recover. After delivery, you may continue to have contractions as the uterus returns to the size it was before your pregnancy. You will also have some vaginal bleeding. And you may have pain around the vagina as you heal. Several days after delivery you may also have pain and swelling in your breasts as they fill with milk. There are things you can do at home to help ease these discomforts.  After childbirth, it's common to feel emotional. You may feel irritable, cry easily, and feel happy one minute and sad the next. This is called the \"baby blues.\" Hormone changes are one cause of these emotional changes. These feelings usually get better within a couple of weeks. If they don't, talk to your doctor or midwife.  In the first couple of weeks after you give birth, your doctor or midwife may want to check in with you and make a plan for follow-up care. You will likely have a complete postpartum visit in the first 3 months after delivery. At that time, your doctor or midwife will check on your recovery and see how you're doing. But if you have questions or concerns before then, you can always call your doctor or midwife.  Follow-up care is a key part of your treatment and safety. Be sure to make and go to all appointments, and call your doctor if you are having problems. It's also a good idea to know your test results and keep a list of the medicines you take.  How can you care for yourself at home?  Taking care of your body  Use pads instead of tampons for bleeding. After birth, you will have bloody " vaginal discharge. You may also pass some blood clots that shouldn't be bigger than an egg. Over the next 6 weeks or so, your bleeding should decrease a little every day and slowly change to a pinkish and then whitish discharge.  For cramps or mild pain, try an over-the-counter pain medicine, such as acetaminophen (Tylenol) or ibuprofen (Advil, Motrin). Read and follow all instructions on the label.  To ease pain around the vagina or from hemorrhoids:  Put ice or a cold pack on the area for 10 to 20 minutes at a time. Put a thin cloth between the ice and your skin.  Try sitting in a few inches of warm water (sitz bath) when you can or after bowel movements.  Clean yourself with a gentle squeeze of warm water from a bottle instead of wiping with toilet paper.  Use witch hazel or hemorrhoid pads (such as Tucks).  Try using a cold compress for sore and swollen breasts. And wear a supportive bra that fits.  Ease constipation by drinking plenty of fluids and eating high-fiber foods. Ask your doctor or midwife about over-the-counter stool softeners.  Activity  Rest when you can.  Ask for help from family or friends when you need it.  If you can, have another adult in your home for at least 2 or 3 days after birth.  When you feel ready, try to get some exercise every day. For many people, walking is a good choice. Don't do any heavy exercise until your doctor or midwife says it's okay.  Ask your doctor or midwife when it is okay to have vaginal sex.  If you don't want to get pregnant, talk to your doctor or midwife about birth control options. You can get pregnant even before your period returns. Also, you can get pregnant while you are breastfeeding.  Talk to your doctor or midwife if you want to get pregnant again. They can talk to you about when it is safe.  Emotional health  It's normal to have some sadness, anxiety, and mood swings after delivery. It may help to talk with a trusted friend or family member. You can  also call the Maternal Mental Health Hotline at 8-139-KUU-MAMA (1-869.519.6450) for support. If these mood changes last more than a couple of weeks, talk to your doctor or midwife.  When should you call for help?  Share this information with your partner, family, or a friend. They can help you watch for warning signs.  Call 911  anytime you think you may need emergency care. For example, call if:    You feel you cannot stop from hurting yourself, your baby, or someone else.     You passed out (lost consciousness).     You have chest pain, are short of breath, or cough up blood.     You have a seizure.   Where to get help 24 hours a day, 7 days a week   If you or someone you know talks about suicide, self-harm, a mental health crisis, a substance use crisis, or any other kind of emotional distress, get help right away. You can:    Call the Suicide and Crisis Lifeline at 988.     Call 3-028-909-TALK (1-469.612.8692).     Text HOME to 130631 to access the Crisis Text Line.   Consider saving these numbers in your phone.  Go to [employer] for more information or to chat online.  Call your doctor or midwife now or seek immediate medical care if:    You have signs of hemorrhage (too much bleeding), such as:  Heavy vaginal bleeding. This means that you are soaking through one or more pads in an hour. Or you pass blood clots bigger than an egg.  Feeling dizzy or lightheaded, or you feel like you may faint.  Feeling so tired or weak that you cannot do your usual activities.  A fast or irregular heartbeat.  New or worse belly pain.     You have signs of infection, such as:  A fever.  Increased pain, swelling, warmth, or redness from an incision or wound.  Frequent or painful urination or blood in your urine.  Vaginal discharge that smells bad.  New or worse belly pain.     You have symptoms of a blood clot in your leg (called a deep vein thrombosis), such as:  Pain in the calf, back of the knee, thigh, or groin.  Swelling  "in the leg or groin.  A color change on the leg or groin. The skin may be reddish or purplish, depending on your usual skin color.     You have signs of preeclampsia, such as:  Sudden swelling of your face, hands, or feet.  New vision problems (such as dimness, blurring, or seeing spots).  A severe headache.     You have signs of heart failure, such as:  New or increased shortness of breath.  New or worse swelling in your legs, ankles, or feet.  Sudden weight gain, such as more than 2 to 3 pounds in a day or 5 pounds in a week.  Feeling so tired or weak that you cannot do your usual activities.     You had spinal or epidural pain relief and have:  New or worse back pain.  Increased pain, swelling, warmth, or redness at the injection site.  Tingling, weakness, or numbness in your legs or groin.   Watch closely for changes in your health, and be sure to contact your doctor or midwife if:    Your vaginal bleeding isn't decreasing.     You feel sad, anxious, or hopeless for more than a few days.     You are having problems with your breasts or breastfeeding.   Where can you learn more?  Go to https://www.[employer].net/patiented  Enter Z768 in the search box to learn more about \"Postpartum Care at Home With Your Baby: Care Instructions.\"  Current as of: July 10, 2023               Content Version: 14.0    7838-6218 [employer].   Care instructions adapted under license by your healthcare professional. If you have questions about a medical condition or this instruction, always ask your healthcare professional. [employer] disclaims any warranty or liability for your use of this information.    Warning Signs after Having a Baby    Keep this paper on your fridge or somewhere else where you can see it.    Call your provider if you have any of these symptoms up to 12 weeks after having your baby.    Thoughts of hurting yourself or your baby  Pain in your chest or trouble breathing  Severe headache " not helped by pain medicine  Eyesight concerns (blurry vision, seeing spots or flashes of light, other changes to eyesight)  Fainting, shaking or other signs of a seizure    Call 9-1-1 if you feel that it is an emergency.     The symptoms below can happen to anyone after giving birth. They can be very serious. Call your provider if you have any of these warning signs.    My provider s phone number: _______________________    Losing too much blood (hemorrhage)    Call your provider if you soak through a pad in less than an hour or pass blood clots bigger than a golf ball. These may be signs that you are bleeding too much.    Blood clots in the legs or lungs    After you give birth, your body naturally clots its blood to help prevent blood loss. Sometimes this increased clotting can happen in other areas of the body, like the legs or lungs. This can block your blood flow and be very dangerous.     Call your provider if you:  Have a red, swollen spot on the back of your leg that is warm or painful when you touch it.   Are coughing up blood.     Infection    Call your provider if you have any of these symptoms:  Fever of 100.4 F (38 C) or higher.  Pain or redness around your stitches if you had an incision.   Any yellow, white, or green fluid coming from places where you had stitches or surgery.    Mood Problems (postpartum depression)    Many people feel sad or have mood changes after having a baby. But for some people, these mood swings are worse.     Call your provider right away if you feel so anxious or nervous that you can't care for yourself or your baby.    Preeclampsia (high blood pressure)    Even if you didn't have high blood pressure when you were pregnant, you are at risk for the high blood pressure disease called preeclampsia. This risk can last up to 12 weeks after giving birth.     Call your provider if you have:   Pain on your right side under your rib cage  Sudden swelling in the hands and  face    Remember: You know your body. If something doesn't feel right, get medical help.     For informational purposes only. Not to replace the advice of your health care provider. Copyright 2020 [employer]. All rights reserved. Clinically reviewed by Mary Walton RNC-OB, MSN. [employer] 531932 - Rev 02/23.

## 2024-06-30 NOTE — PLAN OF CARE
Vital signs stable. Postpartum checks WDL. Pt is up ad mateo, voiding w/o difficulties. Pt is independent in self cares. Pt is breast feeding every 2-3 hrs, tolerating well. Supplementing with EBM and formula. Pt denying pain, knows that Tylenol and Ibuprofen are available. Pt is attentive to all  cues and cares. Positive bonding observed. FOB at bedside, supportive of pt.        Problem: Adult Inpatient Plan of Care  Goal: Plan of Care Review  Description: The Plan of Care Review/Shift note should be completed every shift.  The Outcome Evaluation is a brief statement about your assessment that the patient is improving, declining, or no change.  This information will be displayed automatically on your shift  note.  Outcome: Progressing  Flowsheets (Taken 2024)  Plan of Care Reviewed With: patient  Overall Patient Progress: improving  Goal: Absence of Hospital-Acquired Illness or Injury  Intervention: Prevent Skin Injury  Recent Flowsheet Documentation  Taken 2024 by Elizabeth Dillard RN  Body Position: position changed independently  Intervention: Prevent Infection  Recent Flowsheet Documentation  Taken 2024 by Elizabeth Dillard RN  Infection Prevention:   hand hygiene promoted   rest/sleep promoted   single patient room provided  Goal: Optimal Comfort and Wellbeing  Intervention: Provide Person-Centered Care  Recent Flowsheet Documentation  Taken 2024 by Elizabeth Dillard RN  Trust Relationship/Rapport:   care explained   choices provided   emotional support provided   empathic listening provided   questions answered   questions encouraged   thoughts/feelings acknowledged   reassurance provided     Problem: Postpartum (Vaginal Delivery)  Goal: Optimal Pain Control and Function  Intervention: Prevent or Manage Pain  Recent Flowsheet Documentation  Taken 2024 by Elizabeth Dillard RN  Perineal Care:   perineal hygiene encouraged   perineal spray bottle/warm water use  encouraged

## 2025-04-13 ENCOUNTER — HOSPITAL ENCOUNTER (EMERGENCY)
Facility: CLINIC | Age: 35
Discharge: HOME | End: 2025-04-13
Payer: COMMERCIAL

## 2025-04-13 VITALS
TEMPERATURE: 98.4 F | SYSTOLIC BLOOD PRESSURE: 118 MMHG | OXYGEN SATURATION: 98 % | HEIGHT: 63 IN | RESPIRATION RATE: 18 BRPM | HEART RATE: 86 BPM | BODY MASS INDEX: 30.14 KG/M2 | WEIGHT: 170.1 LBS | DIASTOLIC BLOOD PRESSURE: 75 MMHG

## 2025-04-13 DIAGNOSIS — R42 DIZZINESS: ICD-10-CM

## 2025-04-13 DIAGNOSIS — Z87.09 HISTORY OF STREP SORE THROAT: ICD-10-CM

## 2025-04-13 DIAGNOSIS — R11.2 NAUSEA AND VOMITING: ICD-10-CM

## 2025-04-13 LAB
ALBUMIN SERPL BCG-MCNC: 4.4 G/DL (ref 3.5–5.2)
ALBUMIN UR-MCNC: 30 MG/DL
ALP SERPL-CCNC: 75 U/L (ref 40–150)
ALT SERPL W P-5'-P-CCNC: 11 U/L (ref 0–50)
ANION GAP SERPL CALCULATED.3IONS-SCNC: 17 MMOL/L (ref 7–15)
APPEARANCE UR: CLEAR
AST SERPL W P-5'-P-CCNC: 15 U/L (ref 0–45)
BASOPHILS # BLD AUTO: 0.1 10E3/UL (ref 0–0.2)
BASOPHILS NFR BLD AUTO: 0 %
BILIRUB SERPL-MCNC: 0.5 MG/DL
BILIRUB UR QL STRIP: NEGATIVE
BUN SERPL-MCNC: 9.7 MG/DL (ref 6–20)
CALCIUM SERPL-MCNC: 9.2 MG/DL (ref 8.8–10.4)
CHLORIDE SERPL-SCNC: 101 MMOL/L (ref 98–107)
COLOR UR AUTO: YELLOW
CREAT SERPL-MCNC: 0.76 MG/DL (ref 0.51–0.95)
EGFRCR SERPLBLD CKD-EPI 2021: >90 ML/MIN/1.73M2
EOSINOPHIL # BLD AUTO: 0 10E3/UL (ref 0–0.7)
EOSINOPHIL NFR BLD AUTO: 0 %
ERYTHROCYTE [DISTWIDTH] IN BLOOD BY AUTOMATED COUNT: 14.5 % (ref 10–15)
GLUCOSE SERPL-MCNC: 154 MG/DL (ref 70–99)
GLUCOSE UR STRIP-MCNC: NEGATIVE MG/DL
HCG SERPL QL: NEGATIVE
HCO3 SERPL-SCNC: 20 MMOL/L (ref 22–29)
HCT VFR BLD AUTO: 41.8 % (ref 35–47)
HGB BLD-MCNC: 13.6 G/DL (ref 11.7–15.7)
HGB UR QL STRIP: NEGATIVE
IMM GRANULOCYTES # BLD: 0.1 10E3/UL
IMM GRANULOCYTES NFR BLD: 1 %
KETONES UR STRIP-MCNC: 100 MG/DL
LEUKOCYTE ESTERASE UR QL STRIP: ABNORMAL
LIPASE SERPL-CCNC: 22 U/L (ref 13–60)
LYMPHOCYTES # BLD AUTO: 1.5 10E3/UL (ref 0.8–5.3)
LYMPHOCYTES NFR BLD AUTO: 8 %
MCH RBC QN AUTO: 25.3 PG (ref 26.5–33)
MCHC RBC AUTO-ENTMCNC: 32.5 G/DL (ref 31.5–36.5)
MCV RBC AUTO: 78 FL (ref 78–100)
MONOCYTES # BLD AUTO: 0.4 10E3/UL (ref 0–1.3)
MONOCYTES NFR BLD AUTO: 2 %
MUCOUS THREADS #/AREA URNS LPF: PRESENT /LPF
NEUTROPHILS # BLD AUTO: 17.2 10E3/UL (ref 1.6–8.3)
NEUTROPHILS NFR BLD AUTO: 89 %
NITRATE UR QL: NEGATIVE
NRBC # BLD AUTO: 0 10E3/UL
NRBC BLD AUTO-RTO: 0 /100
PH UR STRIP: 5.5 [PH] (ref 5–7)
PLATELET # BLD AUTO: 403 10E3/UL (ref 150–450)
POTASSIUM SERPL-SCNC: 3.7 MMOL/L (ref 3.4–5.3)
PROT SERPL-MCNC: 7.7 G/DL (ref 6.4–8.3)
RBC # BLD AUTO: 5.38 10E6/UL (ref 3.8–5.2)
RBC URINE: 1 /HPF
SODIUM SERPL-SCNC: 138 MMOL/L (ref 135–145)
SP GR UR STRIP: 1.03 (ref 1–1.03)
SQUAMOUS EPITHELIAL: 1 /HPF
UROBILINOGEN UR STRIP-MCNC: NORMAL MG/DL
WBC # BLD AUTO: 19.3 10E3/UL (ref 4–11)
WBC URINE: 6 /HPF

## 2025-04-13 PROCEDURE — 96376 TX/PRO/DX INJ SAME DRUG ADON: CPT

## 2025-04-13 PROCEDURE — 81001 URINALYSIS AUTO W/SCOPE: CPT

## 2025-04-13 PROCEDURE — 96374 THER/PROPH/DIAG INJ IV PUSH: CPT

## 2025-04-13 PROCEDURE — 83690 ASSAY OF LIPASE: CPT

## 2025-04-13 PROCEDURE — 258N000003 HC RX IP 258 OP 636

## 2025-04-13 PROCEDURE — 84703 CHORIONIC GONADOTROPIN ASSAY: CPT

## 2025-04-13 PROCEDURE — 250N000011 HC RX IP 250 OP 636

## 2025-04-13 PROCEDURE — 87086 URINE CULTURE/COLONY COUNT: CPT

## 2025-04-13 PROCEDURE — 99285 EMERGENCY DEPT VISIT HI MDM: CPT | Mod: 25

## 2025-04-13 PROCEDURE — 96361 HYDRATE IV INFUSION ADD-ON: CPT

## 2025-04-13 PROCEDURE — 80053 COMPREHEN METABOLIC PANEL: CPT

## 2025-04-13 PROCEDURE — 93005 ELECTROCARDIOGRAM TRACING: CPT

## 2025-04-13 PROCEDURE — 250N000013 HC RX MED GY IP 250 OP 250 PS 637

## 2025-04-13 PROCEDURE — 36415 COLL VENOUS BLD VENIPUNCTURE: CPT

## 2025-04-13 PROCEDURE — 85004 AUTOMATED DIFF WBC COUNT: CPT

## 2025-04-13 RX ORDER — MECLIZINE HYDROCHLORIDE 25 MG/1
25 TABLET ORAL ONCE
Status: COMPLETED | OUTPATIENT
Start: 2025-04-13 | End: 2025-04-13

## 2025-04-13 RX ORDER — ONDANSETRON 2 MG/ML
4 INJECTION INTRAMUSCULAR; INTRAVENOUS EVERY 30 MIN PRN
Status: DISCONTINUED | OUTPATIENT
Start: 2025-04-13 | End: 2025-04-13 | Stop reason: HOSPADM

## 2025-04-13 RX ORDER — ONDANSETRON 4 MG/1
4 TABLET, ORALLY DISINTEGRATING ORAL EVERY 8 HOURS PRN
Qty: 12 TABLET | Refills: 0 | Status: SHIPPED | OUTPATIENT
Start: 2025-04-13

## 2025-04-13 RX ORDER — MECLIZINE HYDROCHLORIDE 25 MG/1
25 TABLET ORAL 3 TIMES DAILY PRN
Qty: 30 TABLET | Refills: 0 | Status: SHIPPED | OUTPATIENT
Start: 2025-04-13

## 2025-04-13 RX ADMIN — ONDANSETRON 4 MG: 2 INJECTION, SOLUTION INTRAMUSCULAR; INTRAVENOUS at 11:12

## 2025-04-13 RX ADMIN — SODIUM CHLORIDE 1000 ML: 0.9 INJECTION, SOLUTION INTRAVENOUS at 10:14

## 2025-04-13 RX ADMIN — MECLIZINE HYDROCHLORIDE 25 MG: 25 TABLET ORAL at 10:43

## 2025-04-13 RX ADMIN — MECLIZINE HYDROCHLORIDE 25 MG: 25 TABLET ORAL at 12:49

## 2025-04-13 RX ADMIN — ONDANSETRON 4 MG: 2 INJECTION, SOLUTION INTRAMUSCULAR; INTRAVENOUS at 10:14

## 2025-04-13 ASSESSMENT — ACTIVITIES OF DAILY LIVING (ADL)
ADLS_ACUITY_SCORE: 50

## 2025-04-13 ASSESSMENT — COLUMBIA-SUICIDE SEVERITY RATING SCALE - C-SSRS
6. HAVE YOU EVER DONE ANYTHING, STARTED TO DO ANYTHING, OR PREPARED TO DO ANYTHING TO END YOUR LIFE?: NO
1. IN THE PAST MONTH, HAVE YOU WISHED YOU WERE DEAD OR WISHED YOU COULD GO TO SLEEP AND NOT WAKE UP?: NO
2. HAVE YOU ACTUALLY HAD ANY THOUGHTS OF KILLING YOURSELF IN THE PAST MONTH?: NO

## 2025-04-13 NOTE — ED PROVIDER NOTES
"  Emergency Department Note      History of Present Illness     Chief Complaint   Nausea & Vomiting      HPI   Belkys Speras is a 35 year old female who presents to the ED with nausea and vomiting.  Patient reports body aches and sore throat since Friday. She was diagnosed with Strep A on Saturday and took 2 doses of amoxicillin. Since last night patient has had nausea, vomiting, and vertigo with room spinning dizziness worse with moving her head. Associated symptoms include tingling in her hands and feet, chills, hot flashes, and headache. Patient denies hearing issues, abdominal pain, chest pain, shortness of breath, urinary symptoms, or fever. The body aches and sore throat have improved since Friday.       Independent Historian   None    Review of External Notes   I reviewed the Urgent Care note from yesterday 4/12/25 positive strep throat test and has history of bariatric surgery    Past Medical History     Medical History and Problem List   GERD  Obesity     Medications   Mirena   Lutera     Surgical History   Right ankle surgery  Bariatric surgery       Physical Exam     Patient Vitals for the past 24 hrs:   BP Temp Temp src Pulse Resp SpO2 Height Weight   04/13/25 1230 115/71 -- -- 81 -- 99 % -- --   04/13/25 1200 116/79 -- -- 75 -- 97 % -- --   04/13/25 1100 115/90 -- -- 67 -- 99 % -- --   04/13/25 1045 123/71 -- -- 80 18 99 % -- --   04/13/25 1030 108/82 -- -- 73 18 99 % -- --   04/13/25 1015 112/78 -- -- 75 16 99 % -- --   04/13/25 0925 125/76 98.4  F (36.9  C) Oral 91 18 100 % 1.6 m (5' 3\") 77.2 kg (170 lb 1.6 oz)     Physical Exam  Physical Exam:  GENERAL: Warm, moist, alert, no increased work of breathing, vomiting, speaking full clear sentences without difficulty  HEENT: PERRLA, unidirectional nystagmus to the left.  EOMs intact.  Clear conjunctiva, posterior oropharynx mildly erythematous, uvula midline, no exudates.  Bilateral TMs intact without erythema or bulging.  No mastoid tenderness or " bulging.  No erythema or drainage in bilateral ear canals.  NECK: No JVD, supple without lymphadenopathy.  No stiffness or restricted range of motion  HEART: Regular rate and rhythm, no murmur or rubs  LUNGS: CTAB, moving air well.  No crackles or wheezes are heard.  ABD: Soft, nontender, nondistended, no guarding, with good bowel sounds heard.  BACK: No CVAT, no obvious deformities  EXTREMITIES: Moves all extremities without difficulty, no calf tenderness or peripheral edema.  SKIN: Warm and dry without rash or lesions.  NEUROLOGICAL: No focal deficits.  CN II-XII intact.  HINTS exam peripheral.  PSYCH: Appropriate mood and affect.     Diagnostics     Lab Results   Labs Ordered and Resulted from Time of ED Arrival to Time of ED Departure   COMPREHENSIVE METABOLIC PANEL - Abnormal       Result Value    Sodium 138      Potassium 3.7      Carbon Dioxide (CO2) 20 (*)     Anion Gap 17 (*)     Urea Nitrogen 9.7      Creatinine 0.76      GFR Estimate >90      Calcium 9.2      Chloride 101      Glucose 154 (*)     Alkaline Phosphatase 75      AST 15      ALT 11      Protein Total 7.7      Albumin 4.4      Bilirubin Total 0.5     ROUTINE UA WITH MICROSCOPIC REFLEX TO CULTURE - Abnormal    Color Urine Yellow      Appearance Urine Clear      Glucose Urine Negative      Bilirubin Urine Negative      Ketones Urine 100 (*)     Specific Gravity Urine 1.031      Blood Urine Negative      pH Urine 5.5      Protein Albumin Urine 30 (*)     Urobilinogen Urine Normal      Nitrite Urine Negative      Leukocyte Esterase Urine Moderate (*)     Mucus Urine Present (*)     RBC Urine 1      WBC Urine 6 (*)     Squamous Epithelials Urine 1     CBC WITH PLATELETS AND DIFFERENTIAL - Abnormal    WBC Count 19.3 (*)     RBC Count 5.38 (*)     Hemoglobin 13.6      Hematocrit 41.8      MCV 78      MCH 25.3 (*)     MCHC 32.5      RDW 14.5      Platelet Count 403      % Neutrophils 89      % Lymphocytes 8      % Monocytes 2      % Eosinophils 0       % Basophils 0      % Immature Granulocytes 1      NRBCs per 100 WBC 0      Absolute Neutrophils 17.2 (*)     Absolute Lymphocytes 1.5      Absolute Monocytes 0.4      Absolute Eosinophils 0.0      Absolute Basophils 0.1      Absolute Immature Granulocytes 0.1      Absolute NRBCs 0.0     LIPASE - Normal    Lipase 22     HCG QUALITATIVE PREGNANCY - Normal    hCG Serum Qualitative Negative     URINE CULTURE       Imaging   No orders to display       EKG   ECG taken at 0952, ECG read at 1002  Normal sinus rhythm   Rate 72 bpm. NC interval 148 ms. QRS duration 88 ms. QT/QTc 410/448 ms. P-R-T axes 54 59 45.    Independent Interpretation   None    ED Course      Medications Administered   Medications   ondansetron (ZOFRAN) injection 4 mg (4 mg Intravenous $Given 4/13/25 1112)   sodium chloride 0.9% BOLUS 1,000 mL (0 mLs Intravenous Stopped 4/13/25 1250)   meclizine (ANTIVERT) tablet 25 mg (25 mg Oral $Given 4/13/25 1043)   meclizine (ANTIVERT) tablet 25 mg (25 mg Oral $Given 4/13/25 1249)       Procedures   Procedures     Discussion of Management   None    ED Course   ED Course as of 04/13/25 1309   Sun Apr 13, 2025   0932 I obtained history and examined the patient as noted above.    1039 I rechecked the patient and her symptoms have resolved.   1201 I rechecked the patient again.   1224 Patient was able to walk to commode without return of symptoms, provided urine sample       Additional Documentation  None    Medical Decision Making / Diagnosis     CMS Diagnoses: None    MIPS       None    [facility]   Belkys Spears is a 35 year old female who presents for evaluation of nausea and vomiting with a spinning sensation.  Differential includes but is not limited to vertebral artery dissection, aneurysmal bleed, CVA, otitis media, and peripheral vertigo among many others.  Vitals reassuring without fever, tachycardia, or hypoxia.  On physical exam, there were no focal neurologic deficits, HINTS exam peripheral, abdominal exam  benign.    ECG reassuring, normal intervals, no ischemic changes.  Patient did have an elevated anion gap most likely secondary to ongoing vomiting.  There is no anemia.  She did have a leukocytosis attributed to group A strep throat infection with concurrent demargination from vomiting.  Patient was not pregnant.  Her urinalysis did have a small amount of pyuria with leukocyte esterase however patient has not had urinary symptoms, will await urine culture.  Patient responded quite well to 1 L of NS IVF and ondansetron with near resolution of symptoms.  Hints exam was reassuring indicating peripheral cause.  I discussed with patient his exam as well as possible central causes for vertigo, shared decision was made to forego advanced head imaging today given positive response to Zofran and meclizine.  Also discussed UA testing with patient, she prefers to await culture results before being prescribed antibiotics.  Spoke to patient regarding findings.  Recommended prescriptions for meclizine, ondansetron, and a referral to the vestibular clinic, she agreed.  I also recommended she continue her amoxicillin for the previously diagnosed strep throat infection.  Also thoroughly discussed return precautions including return of nausea and vomiting, severe headache, or development of fever.  Patient states she understands and agrees with all these plans.    Disposition   The patient was discharged.     Diagnosis     ICD-10-CM    1. Nausea and vomiting  R11.2 Physical Therapy  Referral      2. History of strep sore throat  Z87.09       3. Dizziness  R42 Physical Therapy  Referral           Discharge Medications   New Prescriptions    MECLIZINE (ANTIVERT) 25 MG TABLET    Take 1 tablet (25 mg) by mouth 3 times daily as needed for dizziness.    ONDANSETRON (ZOFRAN ODT) 4 MG ODT TAB    Take 1 tablet (4 mg) by mouth every 8 hours as needed for nausea.         Scribe Disclosure:  Ravi TRISTAN, am serving  as a scribe at 9:28 AM on 4/13/2025 to document services personally performed by Dakota Fernandez PA-C based on my observations and the provider's statements to me.        Dakota Fernandez PA-C  04/13/25 [id number]

## 2025-04-13 NOTE — ED TRIAGE NOTES
Patient was diagnosed with strep yesterday, patient states the symptoms are getting worse and she has began vomiting.  Patient is unable to keep anything down.      Triage Assessment (Adult)       Row Name 04/13/25 0961          Triage Assessment    Airway WDL WDL        Respiratory WDL    Respiratory WDL WDL        Skin Circulation/Temperature WDL    Skin Circulation/Temperature WDL WDL        Cardiac WDL    Cardiac WDL WDL        Peripheral/Neurovascular WDL    Peripheral Neurovascular WDL WDL        Cognitive/Neuro/Behavioral WDL    Cognitive/Neuro/Behavioral WDL WDL

## 2025-04-14 ENCOUNTER — APPOINTMENT (OUTPATIENT)
Dept: MRI IMAGING | Facility: CLINIC | Age: 35
End: 2025-04-14
Attending: PHYSICIAN ASSISTANT
Payer: COMMERCIAL

## 2025-04-14 ENCOUNTER — HOSPITAL ENCOUNTER (EMERGENCY)
Facility: CLINIC | Age: 35
Discharge: HOME | End: 2025-04-14
Attending: PHYSICIAN ASSISTANT | Admitting: PHYSICIAN ASSISTANT
Payer: COMMERCIAL

## 2025-04-14 VITALS
DIASTOLIC BLOOD PRESSURE: 87 MMHG | BODY MASS INDEX: 30.16 KG/M2 | SYSTOLIC BLOOD PRESSURE: 134 MMHG | OXYGEN SATURATION: 99 % | HEIGHT: 63 IN | TEMPERATURE: 99 F | RESPIRATION RATE: 18 BRPM | WEIGHT: 170.19 LBS | HEART RATE: 74 BPM

## 2025-04-14 DIAGNOSIS — H81.399 PERIPHERAL VERTIGO, UNSPECIFIED LATERALITY: ICD-10-CM

## 2025-04-14 DIAGNOSIS — R11.10 VOMITING: ICD-10-CM

## 2025-04-14 DIAGNOSIS — D72.829 LEUKOCYTOSIS: ICD-10-CM

## 2025-04-14 LAB
ANION GAP SERPL CALCULATED.3IONS-SCNC: 14 MMOL/L (ref 7–15)
ATRIAL RATE - MUSE: 72 BPM
BACTERIA UR CULT: NO GROWTH
BASOPHILS # BLD AUTO: 0.1 10E3/UL (ref 0–0.2)
BASOPHILS NFR BLD AUTO: 0 %
BUN SERPL-MCNC: 11.3 MG/DL (ref 6–20)
CALCIUM SERPL-MCNC: 9.2 MG/DL (ref 8.8–10.4)
CHLORIDE SERPL-SCNC: 105 MMOL/L (ref 98–107)
CREAT SERPL-MCNC: 0.67 MG/DL (ref 0.51–0.95)
DIASTOLIC BLOOD PRESSURE - MUSE: NORMAL MMHG
EGFRCR SERPLBLD CKD-EPI 2021: >90 ML/MIN/1.73M2
EOSINOPHIL # BLD AUTO: 0 10E3/UL (ref 0–0.7)
EOSINOPHIL NFR BLD AUTO: 0 %
ERYTHROCYTE [DISTWIDTH] IN BLOOD BY AUTOMATED COUNT: 14.6 % (ref 10–15)
GLUCOSE SERPL-MCNC: 119 MG/DL (ref 70–99)
HCO3 SERPL-SCNC: 21 MMOL/L (ref 22–29)
HCT VFR BLD AUTO: 42.4 % (ref 35–47)
HGB BLD-MCNC: 13.7 G/DL (ref 11.7–15.7)
IMM GRANULOCYTES # BLD: 0.1 10E3/UL
IMM GRANULOCYTES NFR BLD: 1 %
INTERPRETATION ECG - MUSE: NORMAL
LYMPHOCYTES # BLD AUTO: 1.2 10E3/UL (ref 0.8–5.3)
LYMPHOCYTES NFR BLD AUTO: 5 %
MAGNESIUM SERPL-MCNC: 1.9 MG/DL (ref 1.7–2.3)
MCH RBC QN AUTO: 25.1 PG (ref 26.5–33)
MCHC RBC AUTO-ENTMCNC: 32.3 G/DL (ref 31.5–36.5)
MCV RBC AUTO: 78 FL (ref 78–100)
MONOCYTES # BLD AUTO: 0.6 10E3/UL (ref 0–1.3)
MONOCYTES NFR BLD AUTO: 3 %
NEUTROPHILS # BLD AUTO: 20.3 10E3/UL (ref 1.6–8.3)
NEUTROPHILS NFR BLD AUTO: 91 %
NRBC # BLD AUTO: 0 10E3/UL
NRBC BLD AUTO-RTO: 0 /100
P AXIS - MUSE: 54 DEGREES
PLATELET # BLD AUTO: 412 10E3/UL (ref 150–450)
POTASSIUM SERPL-SCNC: 4 MMOL/L (ref 3.4–5.3)
PR INTERVAL - MUSE: 148 MS
QRS DURATION - MUSE: 88 MS
QT - MUSE: 410 MS
QTC - MUSE: 448 MS
R AXIS - MUSE: 59 DEGREES
RBC # BLD AUTO: 5.46 10E6/UL (ref 3.8–5.2)
SODIUM SERPL-SCNC: 140 MMOL/L (ref 135–145)
SYSTOLIC BLOOD PRESSURE - MUSE: NORMAL MMHG
T AXIS - MUSE: 45 DEGREES
VENTRICULAR RATE- MUSE: 72 BPM
WBC # BLD AUTO: 22.3 10E3/UL (ref 4–11)

## 2025-04-14 PROCEDURE — 250N000011 HC RX IP 250 OP 636: Performed by: PHYSICIAN ASSISTANT

## 2025-04-14 PROCEDURE — 96375 TX/PRO/DX INJ NEW DRUG ADDON: CPT

## 2025-04-14 PROCEDURE — 96374 THER/PROPH/DIAG INJ IV PUSH: CPT

## 2025-04-14 PROCEDURE — 82374 ASSAY BLOOD CARBON DIOXIDE: CPT | Performed by: PHYSICIAN ASSISTANT

## 2025-04-14 PROCEDURE — 258N000003 HC RX IP 258 OP 636: Performed by: PHYSICIAN ASSISTANT

## 2025-04-14 PROCEDURE — A9585 GADOBUTROL INJECTION: HCPCS | Performed by: PHYSICIAN ASSISTANT

## 2025-04-14 PROCEDURE — 36415 COLL VENOUS BLD VENIPUNCTURE: CPT | Performed by: PHYSICIAN ASSISTANT

## 2025-04-14 PROCEDURE — 83735 ASSAY OF MAGNESIUM: CPT | Performed by: PHYSICIAN ASSISTANT

## 2025-04-14 PROCEDURE — 85025 COMPLETE CBC W/AUTO DIFF WBC: CPT | Performed by: PHYSICIAN ASSISTANT

## 2025-04-14 PROCEDURE — 70553 MRI BRAIN STEM W/O & W/DYE: CPT

## 2025-04-14 PROCEDURE — 99285 EMERGENCY DEPT VISIT HI MDM: CPT | Mod: 25

## 2025-04-14 PROCEDURE — 96361 HYDRATE IV INFUSION ADD-ON: CPT

## 2025-04-14 PROCEDURE — 80048 BASIC METABOLIC PNL TOTAL CA: CPT | Performed by: PHYSICIAN ASSISTANT

## 2025-04-14 PROCEDURE — 255N000002 HC RX 255 OP 636: Performed by: PHYSICIAN ASSISTANT

## 2025-04-14 RX ORDER — DIAZEPAM 5 MG/1
5 TABLET ORAL EVERY 6 HOURS PRN
Qty: 16 TABLET | Refills: 0 | Status: SHIPPED | OUTPATIENT
Start: 2025-04-14

## 2025-04-14 RX ORDER — GADOBUTROL 604.72 MG/ML
7.5 INJECTION INTRAVENOUS ONCE
Status: COMPLETED | OUTPATIENT
Start: 2025-04-14 | End: 2025-04-14

## 2025-04-14 RX ORDER — PROCHLORPERAZINE MALEATE 5 MG/1
5 TABLET ORAL EVERY 6 HOURS PRN
Qty: 20 TABLET | Refills: 0 | Status: SHIPPED | OUTPATIENT
Start: 2025-04-14

## 2025-04-14 RX ORDER — DIAZEPAM 10 MG/2ML
5 INJECTION, SOLUTION INTRAMUSCULAR; INTRAVENOUS ONCE
Status: COMPLETED | OUTPATIENT
Start: 2025-04-14 | End: 2025-04-14

## 2025-04-14 RX ADMIN — SODIUM CHLORIDE 1000 ML: 0.9 INJECTION, SOLUTION INTRAVENOUS at 10:10

## 2025-04-14 RX ADMIN — PROCHLORPERAZINE EDISYLATE 5 MG: 5 INJECTION INTRAMUSCULAR; INTRAVENOUS at 10:09

## 2025-04-14 RX ADMIN — GADOBUTROL 7.5 ML: 604.72 INJECTION INTRAVENOUS at 11:36

## 2025-04-14 RX ADMIN — DIAZEPAM 5 MG: 5 INJECTION INTRAMUSCULAR; INTRAVENOUS at 10:09

## 2025-04-14 ASSESSMENT — COLUMBIA-SUICIDE SEVERITY RATING SCALE - C-SSRS
1. IN THE PAST MONTH, HAVE YOU WISHED YOU WERE DEAD OR WISHED YOU COULD GO TO SLEEP AND NOT WAKE UP?: NO
2. HAVE YOU ACTUALLY HAD ANY THOUGHTS OF KILLING YOURSELF IN THE PAST MONTH?: NO
6. HAVE YOU EVER DONE ANYTHING, STARTED TO DO ANYTHING, OR PREPARED TO DO ANYTHING TO END YOUR LIFE?: NO

## 2025-04-14 ASSESSMENT — ACTIVITIES OF DAILY LIVING (ADL)
ADLS_ACUITY_SCORE: 50

## 2025-04-14 NOTE — ED TRIAGE NOTES
Patient reports ongoing N/V, vertigo.  Reports being seen in ED yesterday.  ABCs intact, A&Ox4     Triage Assessment (Adult)       Row Name 04/14/25 0858          Triage Assessment    Airway WDL WDL        Respiratory WDL    Respiratory WDL WDL        Skin Circulation/Temperature WDL    Skin Circulation/Temperature WDL WDL        Cardiac WDL    Cardiac WDL WDL        Peripheral/Neurovascular WDL    Peripheral Neurovascular WDL WDL        Cognitive/Neuro/Behavioral WDL    Cognitive/Neuro/Behavioral WDL WDL

## 2025-04-14 NOTE — ED PROVIDER NOTES
"  Emergency Department Note      History of Present Illness     Chief Complaint   Nausea & Vomiting      HPI   Belkys Spears is a 35 year old female who presents to the ED for nausea and vomiting. The patient states that she has had nausea and vomiting due to room spinning dizziness for the past 2 days. She describes her dizziness as \"a tornado\". She was seen in the ED for her symptoms yesterday and was given medication for her nausea. She states that she felt better yesterday evening, but redeveloped her symptoms this morning. She tried taking Zofran at 0610 this morning but was spitting up afterwards. She then tried to take meclizine at 0630 and immediately began vomiting. She endorses blurring vision this morning which she attributes to her dizziness and feeling panicked though now gone and no double vision or visual field cuts. Further endorses night sweats as well as a headache yesterday, for which she took tylenol. No current headache and no fever, rash, neck pain or stiffness.  She denies any one-sided numbness, though she does endorse numbness and tingling in her hands when puking. Patient has no history of similar symptoms prior to 2 days ago, though her mother reports that they have a family history of vertigo. No recent head injuries or chiropractic manipulation. Denies any hearing loss, ringing in the ears, or ear pain. Denies chest pain or shortness of breath. Denies abdominal pain. No urinary symptoms.  Of note, patient's gastric band surgery was done over 20 years ago.     Independent Historian   Mother as detailed above.    Review of External Notes   I reviewed the 4/13/25 ED note by Dakota Fernandez PA-C, for nausea and vomiting. EKG was normal. Workup w/normal lipase, CMP, leukocytosis, UA w/few wbc cx negative.  EKG normal. Neg preg. Rx for meclizine and zofran   Past Medical History     Medical History and Problem List   GERD  Excessive weight loss  Gastric band slippage  Irregular " "menstruation  Severe obesity    Medications   Antivert     Surgical History   Past Surgical History:   Procedure Laterality Date    ANKLE SURGERY  [id number]    Right    BARIATRIC SURGERY      GI SURGERY      Laparoscopic adjustable gastric banding 2015    LAPAROSCOPIC REMOVAL GASTRIC ADJUSTABLE BAND N/A 4/13/2018    Procedure: LAPAROSCOPIC REMOVAL GASTRIC ADJUSTABLE BAND;  LAPAROSCOPIC REMOVAL AND REPLACEMENT GASTRIC BAND DEVICE AND PORT ;  Surgeon: Larry Shearer MD;  Location:  OR    ORTHOPEDIC SURGERY      Right ankle surgery 2005       Physical Exam     Patient Vitals for the past 24 hrs:   BP Temp Temp src Pulse Resp SpO2 Height Weight   04/14/25 0859 123/83 97.2  F (36.2  C) Temporal 85 18 100 % 1.6 m (5' 3\") 77.2 kg (170 lb 3.1 oz)     Physical Exam  General: Awake, alert, non-toxic. Sitting in chair with head down and emesis bag.  Head:  Scalp is NC/AT  Eyes:  Conjunctiva normal, PERRL  ENT:  The external nose and ears are normal.  TM's, mastoids, external ears normal BL.  Oropharynx clear, uvula midline no tonsillar hypertrophy, trismus or swelling, phonating clearly, handling secretions.  Neck:  Normal range of motion without rigidity.  CV:  Regular rate and rhythm    No pathologic murmur, rubs, or gallops.  Resp:  Breath sounds are clear bilaterally    Non-labored, no retractions or accessory muscle use  Abdomen: Abdomen is soft, no distension, no tenderness, no masses, no CVA ttp  MS:  No lower extremity edema/swelling. No midline cervical, thoracic, or lumbar tenderness.    Skin:  Warm and dry, No rash or lesions noted.  Neuro:  Alert and oriented. GCS 15 5/5 strength BL to all joints of upper and lower extremities.  Normal and symmetric sensation to touch BL in arms, legs, and face.  CNII-XII intact.  Normal finger to nose testing BL. Gait normal.  Psych: Awake. Alert. Normal affect. Appropriate interactions.      Diagnostics     Lab Results   Labs Ordered and Resulted from Time of ED " Arrival to Time of ED Departure   BASIC METABOLIC PANEL - Abnormal       Result Value    Sodium 140      Potassium 4.0      Chloride 105      Carbon Dioxide (CO2) 21 (*)     Anion Gap 14      Urea Nitrogen 11.3      Creatinine 0.67      GFR Estimate >90      Calcium 9.2      Glucose 119 (*)    CBC WITH PLATELETS AND DIFFERENTIAL - Abnormal    WBC Count 22.3 (*)     RBC Count 5.46 (*)     Hemoglobin 13.7      Hematocrit 42.4      MCV 78      MCH 25.1 (*)     MCHC 32.3      RDW 14.6      Platelet Count 412      % Neutrophils 91      % Lymphocytes 5      % Monocytes 3      % Eosinophils 0      % Basophils 0      % Immature Granulocytes 1      NRBCs per 100 WBC 0      Absolute Neutrophils 20.3 (*)     Absolute Lymphocytes 1.2      Absolute Monocytes 0.6      Absolute Eosinophils 0.0      Absolute Basophils 0.1      Absolute Immature Granulocytes 0.1      Absolute NRBCs 0.0     MAGNESIUM - Normal    Magnesium 1.9         Imaging   MR Brain w/o & w Contrast   Final Result   IMPRESSION: Normal MRI of the brain for patient age.        Independent Interpretation   None    ED Course      Medications Administered   Medications   diazepam (VALIUM) injection 5 mg (5 mg Intravenous $Given 4/14/25 1009)   prochlorperazine (COMPAZINE) injection 5 mg (5 mg Intravenous $Given 4/14/25 1009)   sodium chloride 0.9% BOLUS 1,000 mL (1,000 mLs Intravenous $New Bag 4/14/25 1010)   gadobutrol (GADAVIST) injection 7.5 mL (7.5 mLs Intravenous $Given 4/14/25 1136)       Procedures   Procedures     Discussion of Management   None    ED Course   ED Course as of 04/14/25 1245   Mon Apr 14, 2025   0939 I obtained history and examined the patient as noted above.     1238 I rechecked the patient. She feels better. Able to ambulate and tolerate PO. Will be discharged.       Additional Documentation  None    Medical Decision Making / Diagnosis     CMS Diagnoses: None    MIPS       None    [facility]   Belkys Spears is a 35 year old female who presents for  evaluation of vertigo.     The differential diagnosis of vertigo is broad and includes common etiologies such as menieres disease, vestibular neuritis, benign positional vertigo, otitis media, etc.  More serious etiologies considered include central etiologies such as space occupying lesions, avm, intracerebral bleed, cervical artery dissection, ischemic cerebral vascular accident, TIA and given return visit and severity of symptoms elected to obtain MRI brain w/ and without which was negative and totally normal.  The history, physical exam including detailed neurologic exam, and workup in the emergency room suggests a benign cause of vertigo today.      Patient feels improved after interventions noted above. Further outpatient management is indicated with vertigo medications.   Had lab workup as noted yesterday with benign findings other than leukocytosis.  This is again present today.  Appears to have been present somewhat previously.  I strongly suspect this is stress response and there is no evidence of sepsis or serious bacterial infection patient is afebrile with completely normal vitals negative urine culture yesterday he is being treated still with amoxicillin for strep but notes those symptoms are improving and there is no evidence of complication such as PTA RPA epiglottitis treatment failure etc.  Abdominal exam completely benign no pain nothing to suggest intra-abdominal catastrophe had negative lipase and LFTs yesterday.    Vertigo precautions given for home.  She had a much better response to Valium and Compazine.  I will prescribe these for home with precautions for benzodiazepine sedation addiction driving.  She was already referred by my partner yesterday for physical therapy which I think is a great idea.  Stressed the importance of returning to the ED if any new or worsening symptoms develop.\    Disposition   The patient was discharged.     Diagnosis     ICD-10-CM    1. Peripheral vertigo,  unspecified laterality  H81.399       2. Vomiting  R11.10            Discharge Medications   New Prescriptions    No medications on file         Scribe Disclosure:  I, Jelena Allen, am serving as a scribe at 10:16 AM on 4/14/2025 to document services personally performed by Adam Pelletier PA-C based on my observations and the provider's statements to me.        Adam Pelletier PA-C  04/14/25 1038

## 2025-04-14 NOTE — DISCHARGE INSTRUCTIONS
Discharge Instructions  Vertigo  You have been diagnosed with vertigo.  This is a dizzy feeling often described as spinning or that the room is moving around you. You will often have nausea (sick to your stomach), vomiting (throwing up), and balance problems with it.  Vertigo is usually caused by a problem in the inner ear which helps control your balance.  Many things can cause vertigo, including calcium collections in the inner ear, a virus infection of the inner ear, concussion, migraine, and some medicines.  Luckily, these causes are not life threatening and will eventually go away.  However, sometimes there is a serious problem that does not show up right away.  Generally, every Emergency Department visit should have a follow-up clinic visit with either a primary or a specialty clinic/provider. Please follow-up as instructed by your emergency provider today.  Return to the Emergency Department if you have:  New or severe headache.  Double vision (seeing two of things).  Trouble speaking or hearing.  Weakness or trouble moving/using one side of your body.  Passing out.  Numbness or tingling.  Chest pain.  Vomiting that will not stop.    Treatment:  There are several commonly prescribed medications:  Antihistamines such as meclizine (Antivert ), dimenhydrinate (Dramamine ), or diphenhydramine (Benadryl ).  Prescription anti-nausea medicines, such as promethazine (Phenergan ), metoclopramide (Reglan ), or ondansetron (Zofran ).  Prescription sedative medicines, such as diazepam (Valium ), lorazepam (Ativan ), or clonazepam (Klonopin ).  Most of these medicines make you sleepy, and you should not take them before you work or drive. You should only take prescription medicines to treat severe vertigo symptoms, and you should stop the medicine when your symptoms improve.    Follow Up:  If you have vertigo longer than three days, it is important that you follow up either with your primary provider or an Ear, Nose, and  Throat (ENT) specialist.  You may need further testing to evaluate your vertigo and you may also need  vestibular  therapy which is a special form of physical therapy to make the vertigo go away.    If you were given a prescription for medicine here today, be sure to read all of the information (including the package insert) that comes with your prescription.  This will include important information about the medicine, its side effects, and any warnings that you need to know about.  The pharmacist who fills the prescription can provide more information and answer questions you may have about the medicine.  If you have questions or concerns that the pharmacist cannot address, please call or return to the Emergency Department.     Remember that you can always come back to the Emergency Department if you are not able to see your regular provider in the amount of time listed above, if you get any new symptoms, or if there is anything that worries you.  Discharge Instructions  Vomiting    You have been seen today for vomiting (throwing up). This is usually caused by a virus, but some bacteria, parasites, medicines or other medical conditions can cause similar symptoms. At this time your provider does not find that your vomiting is a sign of anything dangerous or life-threatening. However, sometimes the signs of serious illness do not show up right away. If you have new or worse symptoms, you may need to be seen again in the Emergency Department or by your primary provider. Remember that serious problems like appendicitis can start as vomiting.    Generally, every Emergency Department visit should have a follow-up clinic visit with either a primary or a specialty clinic/provider. Please follow-up as instructed by your emergency provider today.    Return to the Emergency Department if:  You keep vomiting and you are not able to keep liquids down.   You feel you are getting dehydrated, such as being very thirsty, not  urinating (peeing) at least every 8-12 hours, or feeling faint or lightheaded.   You develop a new fever, or your fever continues for more than 2 days.   You have abdominal (belly pain) that seems worse than cramps, is in one spot, or is getting worse over time. Appendicitis usually causes pain in the right lower abdomen (to the right and below your belly button) so watch for pain in this location.  You have blood in your vomit or stools.   You feel very weak.  You are not starting to improve within 24 hours of your visit here.     What can I do to help myself?  The most important thing to do is to drink clear liquids. If you have been vomiting a lot, it is best to have only small, frequent sips of liquids. Drinking too much at once may cause more vomiting. If you are vomiting often, you must replace minerals, sodium and potassium lost with your illness. Pedialyte  is the best available rehydration liquid but some find that it doesn t taste good so sports drinks are an alterative. You can also drink clear liquids such as water, weak tea, apple juice, and 7-Up . Avoid acid liquids (orange), caffeine (coffee) or alcohol. Do not drink milk until you no longer have diarrhea (loose stools).   After liquids are staying down, you may start eating mild foods. Soda crackers, toast, plain noodles, gelatin, applesauce and bananas are good first choices. Avoid foods that have acid, are spicy, fatty or have a lot of fiber (such as meats, coarse grains, vegetables). You may start eating these foods again in about 3 days when you are better.   Sometimes treatment includes prescription medicine to prevent nausea (sick to your stomach) and vomiting. If your provider prescribes these for you, take them as directed.   Do not take ibuprofen, naproxen, or other nonsteroidal anti-inflammatory (NSAID) medicines without checking with your healthcare provider.     If you were given a prescription for medicine here today, be sure to read all  of the information (including the package insert) that comes with your prescription.  This will include important information about the medicine, its side effects, and any warnings that you need to know about.  The pharmacist who fills the prescription can provide more information and answer questions you may have about the medicine.  If you have questions or concerns that the pharmacist cannot address, please call or return to the Emergency Department.     Remember that you can always come back to the Emergency Department if you are not able to see your regular provider in the amount of time listed above, if you get any new symptoms, or if there is anything that worries you.

## 2025-06-14 ENCOUNTER — HEALTH MAINTENANCE LETTER (OUTPATIENT)
Age: 35
End: 2025-06-14

## (undated) DEVICE — SU VICRYL 0 UR-6 27" J603H

## (undated) DEVICE — ESU GROUND PAD UNIVERSAL W/O CORD

## (undated) DEVICE — ENDO TROCAR OPTICAL ACCESS KII Z-THRD 15X100MM C0R37

## (undated) DEVICE — SUCTION CANISTER MEDIVAC LINER 3000ML W/LID 65651-530

## (undated) DEVICE — PACK LAP CHOLE SLC15LCFSD

## (undated) DEVICE — ENDO TROCAR FIRST ENTRY KII FIOS Z-THRD 12X100MM CTF73

## (undated) DEVICE — SU VICRYL 4-0 PS-2 18" UND J496H

## (undated) DEVICE — DRSG BANDAID 3/4X3"

## (undated) DEVICE — NDL 22GA 1.5"

## (undated) DEVICE — PREP CHLORAPREP 26ML TINTED ORANGE  260815

## (undated) DEVICE — GOWN IMPERVIOUS SPECIALTY XLG/XLONG 32474

## (undated) DEVICE — ESU HOLDER LAP INST DISP PURPLE LONG 330MM H-PRO-330

## (undated) DEVICE — SU VICRYL 3-0 SH 27" J316H

## (undated) DEVICE — SYR 10ML SLIP TIP W/O NDL

## (undated) DEVICE — SYR 10ML FINGER CONTROL W/O NDL 309695

## (undated) DEVICE — STPL ENDO RELOAD SIG GIA REINFORCED 60MM MED SIGTRS60AMT

## (undated) DEVICE — GLOVE PROTEXIS W/NEU-THERA 8.0  2D73TE80

## (undated) DEVICE — DRAPE BREAST/CHEST 29420

## (undated) DEVICE — ESU HARMONIC ACE LAP SHEARS STRYKER ACE+ 7 5MMX36CM HARH36

## (undated) DEVICE — SU SILK 0 SH 30" K834H

## (undated) DEVICE — SU ETHIBOND 2-0 CT-2 30" X411H

## (undated) DEVICE — ESU PENCIL W/HOLSTER E2350H

## (undated) DEVICE — ENDO TROCAR FIRST ENTRY KII FIOS Z-THRD 05X100MM CTF03

## (undated) DEVICE — LINEN TOWEL PACK X5 5464

## (undated) DEVICE — ENDO TROCAR FIRST ENTRY KII FIOS Z-THRD 11X100MM CTF33

## (undated) DEVICE — SIGNIA POWER SHELL

## (undated) RX ORDER — CEFAZOLIN SODIUM 1 G/3ML
INJECTION, POWDER, FOR SOLUTION INTRAMUSCULAR; INTRAVENOUS
Status: DISPENSED
Start: 2018-04-13

## (undated) RX ORDER — CEFAZOLIN SODIUM 2 G/100ML
INJECTION, SOLUTION INTRAVENOUS
Status: DISPENSED
Start: 2018-04-13

## (undated) RX ORDER — HYDROMORPHONE HYDROCHLORIDE 1 MG/ML
INJECTION, SOLUTION INTRAMUSCULAR; INTRAVENOUS; SUBCUTANEOUS
Status: DISPENSED
Start: 2018-04-13

## (undated) RX ORDER — FENTANYL CITRATE 50 UG/ML
INJECTION, SOLUTION INTRAMUSCULAR; INTRAVENOUS
Status: DISPENSED
Start: 2018-04-13

## (undated) RX ORDER — DEXAMETHASONE SODIUM PHOSPHATE 4 MG/ML
INJECTION, SOLUTION INTRA-ARTICULAR; INTRALESIONAL; INTRAMUSCULAR; INTRAVENOUS; SOFT TISSUE
Status: DISPENSED
Start: 2018-04-13

## (undated) RX ORDER — VECURONIUM BROMIDE 1 MG/ML
INJECTION, POWDER, LYOPHILIZED, FOR SOLUTION INTRAVENOUS
Status: DISPENSED
Start: 2018-04-13

## (undated) RX ORDER — PROPOFOL 10 MG/ML
INJECTION, EMULSION INTRAVENOUS
Status: DISPENSED
Start: 2018-04-13

## (undated) RX ORDER — GLYCOPYRROLATE 0.2 MG/ML
INJECTION, SOLUTION INTRAMUSCULAR; INTRAVENOUS
Status: DISPENSED
Start: 2018-04-13

## (undated) RX ORDER — BUPIVACAINE HYDROCHLORIDE 2.5 MG/ML
INJECTION, SOLUTION EPIDURAL; INFILTRATION; INTRACAUDAL
Status: DISPENSED
Start: 2018-04-13

## (undated) RX ORDER — ONDANSETRON 2 MG/ML
INJECTION INTRAMUSCULAR; INTRAVENOUS
Status: DISPENSED
Start: 2018-04-13

## (undated) RX ORDER — EPINEPHRINE 1 MG/ML
INJECTION, SOLUTION INTRAMUSCULAR; SUBCUTANEOUS
Status: DISPENSED
Start: 2018-04-13

## (undated) RX ORDER — NEOSTIGMINE METHYLSULFATE 1 MG/ML
VIAL (ML) INJECTION
Status: DISPENSED
Start: 2018-04-13

## (undated) RX ORDER — LIDOCAINE HYDROCHLORIDE 20 MG/ML
INJECTION, SOLUTION EPIDURAL; INFILTRATION; INTRACAUDAL; PERINEURAL
Status: DISPENSED
Start: 2018-04-13